# Patient Record
Sex: MALE | Race: WHITE | NOT HISPANIC OR LATINO | Employment: OTHER | URBAN - METROPOLITAN AREA
[De-identification: names, ages, dates, MRNs, and addresses within clinical notes are randomized per-mention and may not be internally consistent; named-entity substitution may affect disease eponyms.]

---

## 2018-12-28 ENCOUNTER — OFFICE VISIT (OUTPATIENT)
Dept: DERMATOLOGY | Facility: CLINIC | Age: 68
End: 2018-12-28
Payer: MEDICARE

## 2018-12-28 DIAGNOSIS — L82.1 SEBORRHEIC KERATOSIS: Primary | ICD-10-CM

## 2018-12-28 DIAGNOSIS — Z13.89 SCREENING FOR SKIN CONDITION: ICD-10-CM

## 2018-12-28 PROCEDURE — 99213 OFFICE O/P EST LOW 20 MIN: CPT | Performed by: DERMATOLOGY

## 2018-12-28 RX ORDER — CHLORAL HYDRATE 500 MG
1000 CAPSULE ORAL
COMMUNITY
Start: 2014-10-20 | End: 2020-11-12

## 2018-12-28 RX ORDER — GLIMEPIRIDE 2 MG/1
2 TABLET ORAL
COMMUNITY
Start: 2018-03-12

## 2018-12-28 RX ORDER — BLOOD-GLUCOSE METER
KIT MISCELLANEOUS
COMMUNITY
Start: 2018-12-03

## 2018-12-28 RX ORDER — ASPIRIN 325 MG
325 TABLET ORAL
COMMUNITY
End: 2022-01-24 | Stop reason: HOSPADM

## 2018-12-28 RX ORDER — RAMIPRIL 10 MG/1
CAPSULE ORAL
COMMUNITY
Start: 2018-11-05

## 2018-12-28 RX ORDER — METFORMIN HYDROCHLORIDE 1000 MG/1
TABLET, FILM COATED, EXTENDED RELEASE ORAL
COMMUNITY
End: 2020-11-12

## 2018-12-28 RX ORDER — PEN NEEDLE, DIABETIC 31 GX5/16"
NEEDLE, DISPOSABLE MISCELLANEOUS
COMMUNITY
Start: 2018-11-22

## 2018-12-28 RX ORDER — ROSUVASTATIN CALCIUM 10 MG/1
TABLET, COATED ORAL
COMMUNITY
End: 2022-01-24 | Stop reason: HOSPADM

## 2018-12-28 RX ORDER — METOPROLOL SUCCINATE 100 MG/1
TABLET, EXTENDED RELEASE ORAL
COMMUNITY
Start: 2018-11-01 | End: 2022-01-24 | Stop reason: HOSPADM

## 2018-12-28 NOTE — PROGRESS NOTES
[address]  [address]  [facility] [address]  [phone]  [phone]     MRN: [id number] : 1950  Encounter: [id number]  Patient Information: Kyrie Woodard  Chief complaint:  Yearly checkup    History of present illness:  57-year-old male presents for follow-up for overall skin check no specific concerns except for a slightly tender lesion on his left lower thigh  No past medical history on file  No past surgical history on file  Social History   History   Alcohol use Not on file     History   Drug use: Unknown     History   Smoking Status    Never Smoker   Smokeless Tobacco    Never Used     No family history on file  Meds/Allergies   No Known Allergies    Meds:  Prior to Admission medications    Medication Sig Start Date End Date Taking?  Authorizing Provider   aspirin 325 mg tablet Take 325 mg by mouth   Yes Historical Provider, MD JUNG UF III MINI PEN NEEDLES 31G X 5 MM MISC  18  Yes Historical Provider, MD   FREESTYLE LITE test strip  12/3/18  Yes Historical Provider, MD   glimepiride (AMARYL) 2 mg tablet Take 2 mg by mouth 3/12/18  Yes Historical Provider, MD   insulin detemir (LEVEMIR FLEXTOUCH) 100 Units/mL injection pen INJECT 21 UNITS AT BEDTIME 18  Yes Historical Provider, MD   metFORMIN (GLUMETZA) 1000 MG (MOD) 24 hr tablet Take by mouth   Yes Historical Provider, MD   metoprolol succinate (TOPROL-XL) 100 mg 24 hr tablet  18  Yes Historical Provider, MD   Omega-3 Fatty Acids (FISH OIL) 1,000 mg Take 1,000 mg by mouth 10/20/14  Yes Historical Provider, MD   ramipril (ALTACE) 10 MG capsule TAKE 1 CAPSULE TWICE A DAY 18  Yes Historical Provider, MD   rosuvastatin (CRESTOR) 10 MG tablet Take by mouth   Yes Historical Provider, MD       Subjective:     Review of Systems:    General: negative for - chills, fatigue, fever,  weight gain or weight loss  Psychological: negative for - anxiety, behavioral disorder, concentration difficulties, decreased libido, depression, irritability, memory difficulties, mood swings, sleep disturbances or suicidal ideation  ENT: negative for - hearing difficulties , nasal congestion, nasal discharge, oral lesions, sinus pain, sneezing, sore throat  Allergy and Immunology: negative for - hives, insect bite sensitivity,  Hematological and Lymphatic: negative for - bleeding problems, blood clots,bruising, swollen lymph nodes  Endocrine: negative for - hair pattern changes, hot flashes, malaise/lethargy, mood swings, palpitations, polydipsia/polyuria, skin changes, temperature intolerance or unexpected weight change  Respiratory: negative for - cough, hemoptysis, orthopnea, shortness of breath, or wheezing  Cardiovascular: negative for - chest pain, dyspnea on exertion, edema,  Gastrointestinal: negative for - abdominal pain, nausea/vomiting  Genito-Urinary: negative for - dysuria, incontinence, irregular/heavy menses or urinary frequency/urgency  Musculoskeletal: negative for - gait disturbance, joint pain, joint stiffness, joint swelling, muscle pain, muscular weakness  Dermatological:  As in HPI  Neurological: negative for confusion, dizziness, headaches, impaired coordination/balance, memory loss, numbness/tingling, seizures, speech problems, tremors or weakness       Objective: There were no vitals taken for this visit  Physical Exam:    General Appearance:    Alert, cooperative, no distress   Head:    Normocephalic, without obvious abnormality, atraumatic           Skin:   A full skin exam was performed including scalp, head scalp, eyes, ears, nose, lips, neck, chest, axilla, abdomen, back, buttocks, bilateral upper extremities, bilateral lower extremities, hands, feet, fingers, toes, fingernails, and toenails normal keratotic papules greasy stuck on appearance inflamed cystic papule noted on the left groin nothing else remarkable noted exam     Assessment:     1   Seborrheic keratosis 2  Screening for skin condition           Plan:   Seborrheic Keratosis  Patient reasurred these are normal growths we acquire with age no treatment needed  Screening for Dermatologic Disorders: Nothing else of concern noted on complete exam follow up in 1 year   Patient with a little cystic area left thigh may be just a acneiform process if not resolving or more chronic process patient to let us know    Orly Escamilla MD  12/28/2018,10:32 AM    Portions of the record may have been created with voice recognition software   Occasional wrong word or "sound a like" substitutions may have occurred due to the inherent limitations of voice recognition software   Read the chart carefully and recognize, using context, where substitutions have occurred

## 2018-12-28 NOTE — PATIENT INSTRUCTIONS
Seborrheic Keratosis  Patient reasurred these are normal growths we acquire with age no treatment needed    Screening for Dermatologic Disorders: Nothing else of concern noted on complete exam follow up in 1 year   Patient with a little cystic area left thigh may be just a acneiform process if not resolving or more chronic process patient to let us know

## 2019-05-31 ENCOUNTER — TELEPHONE (OUTPATIENT)
Dept: GASTROENTEROLOGY | Facility: CLINIC | Age: 69
End: 2019-05-31

## 2019-06-10 ENCOUNTER — TELEPHONE (OUTPATIENT)
Dept: GASTROENTEROLOGY | Facility: CLINIC | Age: 69
End: 2019-06-10

## 2019-06-25 ENCOUNTER — TELEPHONE (OUTPATIENT)
Dept: GASTROENTEROLOGY | Facility: CLINIC | Age: 69
End: 2019-06-25

## 2019-06-26 ENCOUNTER — ANESTHESIA EVENT (OUTPATIENT)
Dept: GASTROENTEROLOGY | Facility: HOSPITAL | Age: 69
End: 2019-06-26

## 2019-06-27 ENCOUNTER — ANESTHESIA (OUTPATIENT)
Dept: GASTROENTEROLOGY | Facility: HOSPITAL | Age: 69
End: 2019-06-27

## 2019-06-27 ENCOUNTER — HOSPITAL ENCOUNTER (OUTPATIENT)
Dept: GASTROENTEROLOGY | Facility: HOSPITAL | Age: 69
Setting detail: OUTPATIENT SURGERY
Discharge: HOME | End: 2019-06-27
Attending: INTERNAL MEDICINE | Admitting: INTERNAL MEDICINE
Payer: MEDICARE

## 2019-06-27 VITALS
BODY MASS INDEX: 24.64 KG/M2 | SYSTOLIC BLOOD PRESSURE: 140 MMHG | WEIGHT: 198.19 LBS | HEIGHT: 75 IN | HEART RATE: 58 BPM | RESPIRATION RATE: 18 BRPM | TEMPERATURE: 97.2 F | DIASTOLIC BLOOD PRESSURE: 68 MMHG | OXYGEN SATURATION: 98 %

## 2019-06-27 DIAGNOSIS — Z12.11 SCREEN FOR COLON CANCER: ICD-10-CM

## 2019-06-27 LAB — GLUCOSE SERPL-MCNC: 248 MG/DL (ref 65–140)

## 2019-06-27 PROCEDURE — 88305 TISSUE EXAM BY PATHOLOGIST: CPT | Performed by: PATHOLOGY

## 2019-06-27 PROCEDURE — 82948 REAGENT STRIP/BLOOD GLUCOSE: CPT

## 2019-06-27 PROCEDURE — 45385 COLONOSCOPY W/LESION REMOVAL: CPT | Performed by: INTERNAL MEDICINE

## 2019-06-27 RX ORDER — PROPOFOL 10 MG/ML
INJECTION, EMULSION INTRAVENOUS AS NEEDED
Status: DISCONTINUED | OUTPATIENT
Start: 2019-06-27 | End: 2019-06-27 | Stop reason: SURG

## 2019-06-27 RX ORDER — LIDOCAINE HYDROCHLORIDE 10 MG/ML
INJECTION, SOLUTION INFILTRATION; PERINEURAL AS NEEDED
Status: DISCONTINUED | OUTPATIENT
Start: 2019-06-27 | End: 2019-06-27 | Stop reason: SURG

## 2019-06-27 RX ORDER — SODIUM CHLORIDE, SODIUM LACTATE, POTASSIUM CHLORIDE, CALCIUM CHLORIDE 600; 310; 30; 20 MG/100ML; MG/100ML; MG/100ML; MG/100ML
INJECTION, SOLUTION INTRAVENOUS CONTINUOUS PRN
Status: DISCONTINUED | OUTPATIENT
Start: 2019-06-27 | End: 2019-06-27 | Stop reason: SURG

## 2019-06-27 RX ADMIN — PROPOFOL 100 MG: 10 INJECTION, EMULSION INTRAVENOUS at 07:42

## 2019-06-27 RX ADMIN — LIDOCAINE HYDROCHLORIDE 50 MG: 10 INJECTION, SOLUTION INFILTRATION; PERINEURAL at 07:42

## 2019-06-27 RX ADMIN — PROPOFOL 50 MG: 10 INJECTION, EMULSION INTRAVENOUS at 07:47

## 2019-06-27 RX ADMIN — SODIUM CHLORIDE, SODIUM LACTATE, POTASSIUM CHLORIDE, AND CALCIUM CHLORIDE: .6; .31; .03; .02 INJECTION, SOLUTION INTRAVENOUS at 07:28

## 2019-06-27 RX ADMIN — PROPOFOL 50 MG: 10 INJECTION, EMULSION INTRAVENOUS at 07:50

## 2019-06-27 RX ADMIN — PROPOFOL 50 MG: 10 INJECTION, EMULSION INTRAVENOUS at 07:44

## 2019-06-27 NOTE — H&P
History and Physical -  [facility]  Charline Yoder 76 y o  male MRN: [id number]                  HPI: Charline Yoder is a 76y o  year old male who presents for colonoscopy for a history of colon polyps  Last colonoscopy 5 years ago      REVIEW OF SYSTEMS: Per the HPI, and otherwise unremarkable  Historical Information   Past Medical History:   Diagnosis Date    Colon cancer ([address] )     Colon polyp      Past Surgical History:   Procedure Laterality Date    CATARACT EXTRACTION      COLONOSCOPY      CORONARY ANGIOPLASTY WITH STENT PLACEMENT  2003    CYST REMOVAL  2016     Social History   Social History     Substance and Sexual Activity   Alcohol Use Never    Frequency: Never     Social History     Substance and Sexual Activity   Drug Use Never     Social History     Tobacco Use   Smoking Status Former Smoker   Smokeless Tobacco Never Used     History reviewed  No pertinent family history  Meds/Allergies       (Not in a hospital admission)    No Known Allergies    Objective     Blood pressure 139/73, pulse 62, temperature 97 9 °F (36 6 °C), temperature source Oral, resp  rate 16, height 6' 3" (1 905 m), weight 89 9 kg (198 lb 3 1 oz), SpO2 97 %        PHYSICAL EXAM    Gen: NAD  CV: RRR  CHEST: Clear  ABD: soft, NT/ND  EXT: no edema  Neuro: AAO      ASSESSMENT/PLAN:  This is a 76y o  year old male here for history of colon polyps    PLAN:   Procedure:  Colonoscopy

## 2019-06-27 NOTE — DISCHARGE INSTRUCTIONS
Colonoscopy   WHAT YOU NEED TO KNOW:   A colonoscopy is a procedure to examine the inside of your colon (intestine) with a scope  Polyps or tissue growths may have been removed during your colonoscopy  It is normal to feel bloated and to have some abdominal discomfort  You should be passing gas  If you have hemorrhoids or you had polyps removed, you may have a small amount of bleeding  DISCHARGE INSTRUCTIONS:   Seek care immediately if:   · You have a large amount of bright red blood in your bowel movements  · Your abdomen is hard and firm and you have severe pain  · You have sudden trouble breathing  Contact your healthcare provider if:   · You develop a rash or hives  · You have a fever within 24 hours of your procedure       · You have not had a bowel movement for 3 days after your procedure  · You have questions or concerns about your condition or care  Activity:   · Do not lift, strain, or run  for 3 days after your procedure  · Rest after your procedure  You have been given medicine to relax you  Do not  drive or make important decisions until the day after your procedure  Return to your normal activity as directed  · Relieve gas and discomfort from bloating  by lying on your right side with a heating pad on your abdomen  You may need to take short walks to help the gas move out  Eat small meals until bloating is relieved  If you had polyps removed: For 7 days after your procedure:  · Do not  take aspirin  · Do not  go on long car rides  Follow up with your healthcare provider as directed:  Write down your questions so you remember to ask them during your visits  © 2017 [employer] is for End User's use only and may not be sold, redistributed or otherwise used for commercial purposes  All illustrations and images included in CareNotes® are the copyrighted property of [employer]  or Jeremiah Ndiaye    The above information is an  only  It is not intended as medical advice for individual conditions or treatments  Talk to your doctor, nurse or pharmacist before following any medical regimen to see if it is safe and effective for you

## 2019-07-02 ENCOUNTER — TELEPHONE (OUTPATIENT)
Dept: GASTROENTEROLOGY | Facility: CLINIC | Age: 69
End: 2019-07-02

## 2019-12-30 ENCOUNTER — OFFICE VISIT (OUTPATIENT)
Dept: DERMATOLOGY | Facility: CLINIC | Age: 69
End: 2019-12-30
Payer: MEDICARE

## 2019-12-30 DIAGNOSIS — L72.9 FOLLICULAR CYST OF SKIN: Primary | ICD-10-CM

## 2019-12-30 DIAGNOSIS — L82.1 SEBORRHEIC KERATOSIS: ICD-10-CM

## 2019-12-30 DIAGNOSIS — Z13.89 SCREENING FOR SKIN CONDITION: ICD-10-CM

## 2019-12-30 PROCEDURE — 99213 OFFICE O/P EST LOW 20 MIN: CPT | Performed by: DERMATOLOGY

## 2019-12-30 NOTE — PATIENT INSTRUCTIONS
Follicular cyst advised patient that this is from hair follicles that ballooned out and forms this type of growth  No treatment indicated unless patient so desires or if lesion enlarges or changes  Seborrheic Keratosis  Patient reasurred these are normal growths we acquire with age no treatment needed    Screening for Dermatologic Disorders: Nothing else of concern noted on complete exam follow up in 1 year

## 2019-12-30 NOTE — PROGRESS NOTES
[facility] DERMATOLOGY  [address] [place] [id number] [phone]     MRN: [id number] : 1950  Encounter: [id number]  Patient Information: Heidy Sanders  Chief complaint:  Skin check    History of present illness:  71 y o  Male with previous noted keratosis follicular cyst presents for overall checkup patient concerned regarding a new lesion that was previously excised on the right chest has recurred  Past Medical History:   Diagnosis Date    Colon cancer ([address] )     Colon polyp      Past Surgical History:   Procedure Laterality Date    CATARACT EXTRACTION      COLONOSCOPY      CORONARY ANGIOPLASTY WITH STENT PLACEMENT      CYST REMOVAL  2016     Social History   Social History     Substance and Sexual Activity   Alcohol Use Never    Frequency: Never     Social History     Substance and Sexual Activity   Drug Use Never     Social History     Tobacco Use   Smoking Status Former Smoker   Smokeless Tobacco Never Used     History reviewed  No pertinent family history  Meds/Allergies   No Known Allergies    Meds:  Prior to Admission medications    Medication Sig Start Date End Date Taking?  Authorizing Provider   aspirin 325 mg tablet Take 325 mg by mouth   Yes Historical Provider, MD JUNG UF III MINI PEN NEEDLES 31G X 5 MM MISC  18  Yes Historical Provider, MD   FREESTYLE LITE test strip  12/3/18  Yes Historical Provider, MD   glimepiride (AMARYL) 2 mg tablet Take 2 mg by mouth 3/12/18  Yes Historical Provider, MD   insulin detemir (LEVEMIR FLEXTOUCH) 100 Units/mL injection pen INJECT 21 UNITS AT BEDTIME 18  Yes Historical Provider, MD   metFORMIN (GLUMETZA) 1000 MG (MOD) 24 hr tablet Take by mouth   Yes Historical Provider, MD   metoprolol succinate (TOPROL-XL) 100 mg 24 hr tablet  18  Yes Historical Provider, MD   Omega-3 Fatty Acids (FISH OIL) 1,000 mg Take 1,000 mg by mouth 10/20/14  Yes Historical Provider, MD ramipril (ALTACE) 10 MG capsule TAKE 1 CAPSULE TWICE A DAY 11/5/18  Yes Historical Provider, MD   rosuvastatin (CRESTOR) 10 MG tablet Take by mouth   Yes Historical Provider, MD       Subjective:     Review of Systems:    General: negative for - chills, fatigue, fever,  weight gain or weight loss  Psychological: negative for - anxiety, behavioral disorder, concentration difficulties, decreased libido, depression, irritability, memory difficulties, mood swings, sleep disturbances or suicidal ideation  ENT: negative for - hearing difficulties , nasal congestion, nasal discharge, oral lesions, sinus pain, sneezing, sore throat  Allergy and Immunology: negative for - hives, insect bite sensitivity,  Hematological and Lymphatic: negative for - bleeding problems, blood clots,bruising, swollen lymph nodes  Endocrine: negative for - hair pattern changes, hot flashes, malaise/lethargy, mood swings, palpitations, polydipsia/polyuria, skin changes, temperature intolerance or unexpected weight change  Respiratory: negative for - cough, hemoptysis, orthopnea, shortness of breath, or wheezing  Cardiovascular: negative for - chest pain, dyspnea on exertion, edema,  Gastrointestinal: negative for - abdominal pain, nausea/vomiting  Genito-Urinary: negative for - dysuria, incontinence, irregular/heavy menses or urinary frequency/urgency  Musculoskeletal: negative for - gait disturbance, joint pain, joint stiffness, joint swelling, muscle pain, muscular weakness  Dermatological:  As in HPI  Neurological: negative for confusion, dizziness, headaches, impaired coordination/balance, memory loss, numbness/tingling, seizures, speech problems, tremors or weakness       Objective: There were no vitals taken for this visit      Physical Exam:    General Appearance:    Alert, cooperative, no distress   Head:    Normocephalic, without obvious abnormality, atraumatic           Skin:   A full skin exam was performed including scalp, head scalp, eyes, ears, nose, lips, neck, chest, axilla, abdomen, back, buttocks, bilateral upper extremities, bilateral lower extremities, hands, feet, fingers, toes, fingernails, and toenails normal keratotic papules greasy stuck appearance 7 mm cystic nodule noted on the right chest nothing else remarkable noted on complete exam     Assessment:     1  Follicular cyst of skin     2  Seborrheic keratosis     3  Screening for skin condition           Plan: Follicular cyst advised patient that this is from hair follicles that ballooned out and forms this type of growth  No treatment indicated unless patient so desires or if lesion enlarges or changes  Seborrheic Keratosis  Patient reasurred these are normal growths we acquire with age no treatment needed  Screening for Dermatologic Disorders: Nothing else of concern noted on complete exam follow up in 1 year       Dada Lui MD  12/30/2019,9:59 AM    Portions of the record may have been created with voice recognition software   Occasional wrong word or "sound a like" substitutions may have occurred due to the inherent limitations of voice recognition software   Read the chart carefully and recognize, using context, where substitutions have occurred

## 2020-09-08 ENCOUNTER — TRANSCRIBE ORDERS (OUTPATIENT)
Dept: ADMINISTRATIVE | Facility: HOSPITAL | Age: 70
End: 2020-09-08

## 2020-09-08 DIAGNOSIS — M25.771 OSTEOPHYTE OF RIGHT ANKLE: Primary | ICD-10-CM

## 2020-09-30 ENCOUNTER — HOSPITAL ENCOUNTER (OUTPATIENT)
Dept: MRI IMAGING | Facility: HOSPITAL | Age: 70
Discharge: HOME | End: 2020-09-30
Payer: MEDICARE

## 2020-09-30 DIAGNOSIS — M25.771 OSTEOPHYTE OF RIGHT ANKLE: ICD-10-CM

## 2020-09-30 PROCEDURE — 73721 MRI JNT OF LWR EXTRE W/O DYE: CPT

## 2020-09-30 PROCEDURE — G1004 CDSM NDSC: HCPCS

## 2020-11-12 ENCOUNTER — OFFICE VISIT (OUTPATIENT)
Dept: GASTROENTEROLOGY | Facility: CLINIC | Age: 70
End: 2020-11-12
Payer: MEDICARE

## 2020-11-12 VITALS
TEMPERATURE: 98 F | HEART RATE: 63 BPM | SYSTOLIC BLOOD PRESSURE: 140 MMHG | BODY MASS INDEX: 28.1 KG/M2 | RESPIRATION RATE: 18 BRPM | DIASTOLIC BLOOD PRESSURE: 90 MMHG | WEIGHT: 226 LBS | HEIGHT: 75 IN

## 2020-11-12 DIAGNOSIS — Z86.010 HISTORY OF COLON POLYPS: ICD-10-CM

## 2020-11-12 DIAGNOSIS — R10.32 LEFT LOWER QUADRANT ABDOMINAL PAIN: ICD-10-CM

## 2020-11-12 DIAGNOSIS — K21.9 GASTROESOPHAGEAL REFLUX DISEASE WITHOUT ESOPHAGITIS: Primary | ICD-10-CM

## 2020-11-12 DIAGNOSIS — R19.7 DIARRHEA, UNSPECIFIED TYPE: ICD-10-CM

## 2020-11-12 DIAGNOSIS — K59.00 CONSTIPATION, UNSPECIFIED CONSTIPATION TYPE: ICD-10-CM

## 2020-11-12 PROCEDURE — 99204 OFFICE O/P NEW MOD 45 MIN: CPT | Performed by: PHYSICIAN ASSISTANT

## 2020-11-12 RX ORDER — PANTOPRAZOLE SODIUM 40 MG/1
40 TABLET, DELAYED RELEASE ORAL DAILY
Qty: 30 TABLET | Refills: 1 | Status: SHIPPED | OUTPATIENT
Start: 2020-11-12

## 2020-11-17 ENCOUNTER — ANESTHESIA EVENT (OUTPATIENT)
Dept: GASTROENTEROLOGY | Facility: HOSPITAL | Age: 70
End: 2020-11-17

## 2020-11-18 ENCOUNTER — HOSPITAL ENCOUNTER (OUTPATIENT)
Dept: GASTROENTEROLOGY | Facility: HOSPITAL | Age: 70
Setting detail: OUTPATIENT SURGERY
Discharge: HOME | End: 2020-11-18
Attending: INTERNAL MEDICINE
Payer: MEDICARE

## 2020-11-18 ENCOUNTER — TELEPHONE (OUTPATIENT)
Dept: GASTROENTEROLOGY | Facility: CLINIC | Age: 70
End: 2020-11-18

## 2020-11-18 ENCOUNTER — ANESTHESIA (OUTPATIENT)
Dept: GASTROENTEROLOGY | Facility: HOSPITAL | Age: 70
End: 2020-11-18

## 2020-11-18 VITALS
HEART RATE: 50 BPM | SYSTOLIC BLOOD PRESSURE: 165 MMHG | DIASTOLIC BLOOD PRESSURE: 77 MMHG | TEMPERATURE: 98.4 F | HEIGHT: 75 IN | WEIGHT: 221.78 LBS | OXYGEN SATURATION: 100 % | BODY MASS INDEX: 27.58 KG/M2 | RESPIRATION RATE: 22 BRPM

## 2020-11-18 VITALS — HEART RATE: 58 BPM

## 2020-11-18 DIAGNOSIS — Z12.11 SCREEN FOR COLON CANCER: ICD-10-CM

## 2020-11-18 DIAGNOSIS — Z86.010 HISTORY OF COLON POLYPS: ICD-10-CM

## 2020-11-18 DIAGNOSIS — R10.32 LEFT LOWER QUADRANT ABDOMINAL PAIN: ICD-10-CM

## 2020-11-18 DIAGNOSIS — K21.9 GASTROESOPHAGEAL REFLUX DISEASE WITHOUT ESOPHAGITIS: ICD-10-CM

## 2020-11-18 DIAGNOSIS — K59.00 CONSTIPATION, UNSPECIFIED CONSTIPATION TYPE: ICD-10-CM

## 2020-11-18 DIAGNOSIS — R19.7 DIARRHEA, UNSPECIFIED TYPE: ICD-10-CM

## 2020-11-18 LAB — GLUCOSE SERPL-MCNC: 145 MG/DL (ref 65–140)

## 2020-11-18 PROCEDURE — 88305 TISSUE EXAM BY PATHOLOGIST: CPT | Performed by: PATHOLOGY

## 2020-11-18 PROCEDURE — 82948 REAGENT STRIP/BLOOD GLUCOSE: CPT

## 2020-11-18 PROCEDURE — 45385 COLONOSCOPY W/LESION REMOVAL: CPT | Performed by: INTERNAL MEDICINE

## 2020-11-18 PROCEDURE — 43235 EGD DIAGNOSTIC BRUSH WASH: CPT | Performed by: INTERNAL MEDICINE

## 2020-11-18 RX ORDER — PROPOFOL 10 MG/ML
INJECTION, EMULSION INTRAVENOUS AS NEEDED
Status: DISCONTINUED | OUTPATIENT
Start: 2020-11-18 | End: 2020-11-18

## 2020-11-18 RX ORDER — METFORMIN HYDROCHLORIDE 750 MG/1
750 TABLET, EXTENDED RELEASE ORAL
COMMUNITY

## 2020-11-18 RX ORDER — LIDOCAINE HYDROCHLORIDE 10 MG/ML
INJECTION, SOLUTION EPIDURAL; INFILTRATION; INTRACAUDAL; PERINEURAL AS NEEDED
Status: DISCONTINUED | OUTPATIENT
Start: 2020-11-18 | End: 2020-11-18

## 2020-11-18 RX ORDER — LIDOCAINE HYDROCHLORIDE 10 MG/ML
0.5 INJECTION, SOLUTION EPIDURAL; INFILTRATION; INTRACAUDAL; PERINEURAL ONCE AS NEEDED
Status: DISCONTINUED | OUTPATIENT
Start: 2020-11-18 | End: 2020-11-22 | Stop reason: HOSPADM

## 2020-11-18 RX ORDER — SODIUM CHLORIDE, SODIUM LACTATE, POTASSIUM CHLORIDE, CALCIUM CHLORIDE 600; 310; 30; 20 MG/100ML; MG/100ML; MG/100ML; MG/100ML
125 INJECTION, SOLUTION INTRAVENOUS CONTINUOUS
Status: DISCONTINUED | OUTPATIENT
Start: 2020-11-18 | End: 2020-11-18

## 2020-11-18 RX ORDER — AMLODIPINE BESYLATE 10 MG/1
10 TABLET ORAL DAILY
COMMUNITY

## 2020-11-18 RX ADMIN — PROPOFOL 20 MG: 10 INJECTION, EMULSION INTRAVENOUS at 09:12

## 2020-11-18 RX ADMIN — PROPOFOL 20 MG: 10 INJECTION, EMULSION INTRAVENOUS at 09:20

## 2020-11-18 RX ADMIN — PROPOFOL 50 MG: 10 INJECTION, EMULSION INTRAVENOUS at 09:00

## 2020-11-18 RX ADMIN — PROPOFOL 20 MG: 10 INJECTION, EMULSION INTRAVENOUS at 09:10

## 2020-11-18 RX ADMIN — PROPOFOL 30 MG: 10 INJECTION, EMULSION INTRAVENOUS at 09:06

## 2020-11-18 RX ADMIN — LIDOCAINE HYDROCHLORIDE 50 MG: 10 INJECTION, SOLUTION EPIDURAL; INFILTRATION; INTRACAUDAL; PERINEURAL at 08:59

## 2020-11-18 RX ADMIN — PROPOFOL 20 MG: 10 INJECTION, EMULSION INTRAVENOUS at 09:08

## 2020-11-18 RX ADMIN — PROPOFOL 100 MG: 10 INJECTION, EMULSION INTRAVENOUS at 08:59

## 2020-11-18 RX ADMIN — PROPOFOL 30 MG: 10 INJECTION, EMULSION INTRAVENOUS at 09:01

## 2020-11-18 RX ADMIN — PROPOFOL 20 MG: 10 INJECTION, EMULSION INTRAVENOUS at 09:17

## 2020-11-18 RX ADMIN — PROPOFOL 20 MG: 10 INJECTION, EMULSION INTRAVENOUS at 09:16

## 2020-11-18 RX ADMIN — SODIUM CHLORIDE, SODIUM LACTATE, POTASSIUM CHLORIDE, AND CALCIUM CHLORIDE 125 ML/HR: .6; .31; .03; .02 INJECTION, SOLUTION INTRAVENOUS at 08:09

## 2020-11-18 RX ADMIN — PROPOFOL 20 MG: 10 INJECTION, EMULSION INTRAVENOUS at 09:03

## 2020-11-19 ENCOUNTER — TELEPHONE (OUTPATIENT)
Dept: GASTROENTEROLOGY | Facility: CLINIC | Age: 70
End: 2020-11-19

## 2020-11-23 ENCOUNTER — HOSPITAL ENCOUNTER (OUTPATIENT)
Dept: RADIOLOGY | Facility: MEDICAL CENTER | Age: 70
Discharge: HOME | End: 2020-11-23
Payer: MEDICARE

## 2020-11-23 DIAGNOSIS — R10.32 LEFT LOWER QUADRANT ABDOMINAL PAIN: ICD-10-CM

## 2020-11-23 PROCEDURE — G1004 CDSM NDSC: HCPCS

## 2020-11-23 PROCEDURE — 74177 CT ABD & PELVIS W/CONTRAST: CPT

## 2020-11-23 RX ADMIN — IOHEXOL 100 ML: 350 INJECTION, SOLUTION INTRAVENOUS at 11:02

## 2020-11-24 ENCOUNTER — TELEPHONE (OUTPATIENT)
Dept: GASTROENTEROLOGY | Facility: CLINIC | Age: 70
End: 2020-11-24

## 2020-11-25 ENCOUNTER — TELEPHONE (OUTPATIENT)
Dept: GASTROENTEROLOGY | Facility: CLINIC | Age: 70
End: 2020-11-25

## 2020-12-08 ENCOUNTER — ANESTHESIA EVENT (OUTPATIENT)
Dept: GASTROENTEROLOGY | Facility: HOSPITAL | Age: 70
End: 2020-12-08

## 2020-12-08 PROBLEM — E10.9 DIABETES MELLITUS TYPE 1 (HCC): Status: ACTIVE | Noted: 2020-12-08

## 2020-12-08 PROBLEM — I25.10 CAD (CORONARY ARTERY DISEASE): Status: ACTIVE | Noted: 2020-12-08

## 2020-12-08 PROBLEM — Z98.61 HISTORY OF PTCA: Status: ACTIVE | Noted: 2020-12-08

## 2020-12-08 RX ORDER — SODIUM CHLORIDE 9 MG/ML
125 INJECTION, SOLUTION INTRAVENOUS CONTINUOUS
Status: CANCELLED | OUTPATIENT
Start: 2020-12-08

## 2020-12-08 RX ORDER — LIDOCAINE HYDROCHLORIDE 10 MG/ML
0.5 INJECTION, SOLUTION EPIDURAL; INFILTRATION; INTRACAUDAL; PERINEURAL ONCE AS NEEDED
Status: CANCELLED | OUTPATIENT
Start: 2020-12-08

## 2020-12-09 ENCOUNTER — HOSPITAL ENCOUNTER (OUTPATIENT)
Dept: GASTROENTEROLOGY | Facility: HOSPITAL | Age: 70
Setting detail: OUTPATIENT SURGERY
Discharge: HOME | End: 2020-12-09
Attending: INTERNAL MEDICINE | Admitting: INTERNAL MEDICINE
Payer: MEDICARE

## 2020-12-09 ENCOUNTER — ANESTHESIA (OUTPATIENT)
Dept: GASTROENTEROLOGY | Facility: HOSPITAL | Age: 70
End: 2020-12-09

## 2020-12-09 VITALS
TEMPERATURE: 97.9 F | DIASTOLIC BLOOD PRESSURE: 69 MMHG | RESPIRATION RATE: 16 BRPM | SYSTOLIC BLOOD PRESSURE: 127 MMHG | OXYGEN SATURATION: 98 % | HEART RATE: 54 BPM

## 2020-12-09 VITALS — HEART RATE: 65 BPM

## 2020-12-09 DIAGNOSIS — K31.89 SUBMUCOSAL LESION OF STOMACH: ICD-10-CM

## 2020-12-09 PROCEDURE — 43237 ENDOSCOPIC US EXAM ESOPH: CPT | Performed by: INTERNAL MEDICINE

## 2020-12-09 RX ORDER — PROPOFOL 10 MG/ML
INJECTION, EMULSION INTRAVENOUS AS NEEDED
Status: DISCONTINUED | OUTPATIENT
Start: 2020-12-09 | End: 2020-12-09

## 2020-12-09 RX ORDER — LIDOCAINE HYDROCHLORIDE 10 MG/ML
INJECTION, SOLUTION EPIDURAL; INFILTRATION; INTRACAUDAL; PERINEURAL AS NEEDED
Status: DISCONTINUED | OUTPATIENT
Start: 2020-12-09 | End: 2020-12-09

## 2020-12-09 RX ORDER — PROPOFOL 10 MG/ML
INJECTION, EMULSION INTRAVENOUS CONTINUOUS PRN
Status: DISCONTINUED | OUTPATIENT
Start: 2020-12-09 | End: 2020-12-09

## 2020-12-09 RX ORDER — SODIUM CHLORIDE 9 MG/ML
INJECTION, SOLUTION INTRAVENOUS CONTINUOUS PRN
Status: DISCONTINUED | OUTPATIENT
Start: 2020-12-09 | End: 2020-12-09

## 2020-12-09 RX ADMIN — PROPOFOL 120 MG: 10 INJECTION, EMULSION INTRAVENOUS at 09:31

## 2020-12-09 RX ADMIN — PROPOFOL 30 MG: 10 INJECTION, EMULSION INTRAVENOUS at 09:32

## 2020-12-09 RX ADMIN — PROPOFOL 120 MCG/KG/MIN: 10 INJECTION, EMULSION INTRAVENOUS at 09:31

## 2020-12-09 RX ADMIN — LIDOCAINE HYDROCHLORIDE 100 MG: 10 INJECTION, SOLUTION EPIDURAL; INFILTRATION; INTRACAUDAL; PERINEURAL at 09:31

## 2020-12-09 RX ADMIN — SODIUM CHLORIDE: 9 INJECTION, SOLUTION INTRAVENOUS at 09:24

## 2021-01-04 ENCOUNTER — OFFICE VISIT (OUTPATIENT)
Dept: DERMATOLOGY | Facility: CLINIC | Age: 71
End: 2021-01-04
Payer: MEDICARE

## 2021-01-04 VITALS — TEMPERATURE: 97.8 F

## 2021-01-04 DIAGNOSIS — L82.1 SEBORRHEIC KERATOSIS: ICD-10-CM

## 2021-01-04 DIAGNOSIS — L98.9 UNKNOWN SKIN LESION: Primary | ICD-10-CM

## 2021-01-04 DIAGNOSIS — L57.0 ACTINIC KERATOSIS: ICD-10-CM

## 2021-01-04 DIAGNOSIS — Z13.89 SCREENING FOR SKIN CONDITION: ICD-10-CM

## 2021-01-04 PROCEDURE — 88305 TISSUE EXAM BY PATHOLOGIST: CPT | Performed by: STUDENT IN AN ORGANIZED HEALTH CARE EDUCATION/TRAINING PROGRAM

## 2021-01-04 PROCEDURE — 17000 DESTRUCT PREMALG LESION: CPT | Performed by: DERMATOLOGY

## 2021-01-04 PROCEDURE — 11102 TANGNTL BX SKIN SINGLE LES: CPT | Performed by: DERMATOLOGY

## 2021-01-04 PROCEDURE — 99213 OFFICE O/P EST LOW 20 MIN: CPT | Performed by: DERMATOLOGY

## 2021-01-04 RX ORDER — FLUTICASONE PROPIONATE 50 MCG
SPRAY, SUSPENSION (ML) NASAL
COMMUNITY
Start: 2020-10-17

## 2021-01-04 RX ORDER — FLASH GLUCOSE SCANNING READER
1 EACH MISCELLANEOUS
COMMUNITY
Start: 2020-08-13

## 2021-01-04 RX ORDER — LORATADINE 10 MG/1
TABLET ORAL
COMMUNITY

## 2021-01-04 RX ORDER — SILODOSIN 8 MG/1
8 CAPSULE ORAL DAILY
COMMUNITY
Start: 2020-12-09 | End: 2022-03-11

## 2021-01-04 NOTE — PATIENT INSTRUCTIONS
Skin lesion possible basal cell carcinoma if positive would require curettage  Actinic Keratosis:  Patient advised lesions are precancers  These should resolve with cryosurgery if not to let us know sun block recommended  Seborrheic Keratosis  Patient reasurred these are normal growths we acquire with age no treatment needed  Screening for Dermatologic Disorders: Nothing else of concern noted on complete exam follow up in 1 year   Wound care instructions given to patient  Treatment with Cryotherapy    The doctor has treated your skin with nitrogen, which is 320 degrees Fahrenheit below zero  He has given the treated area "frostbite "    Stinging should subside within a few hours  You can take Tylenol for pain, if needed  Over the next few days, it is normal if the area becomes reddened, a blood blister, or swollen with fluid  If the lesion treated was near the eye - you could get a swollen eye over the next few days  Do not panic! This is all temporary, and will resolve with time  There is no special treatment - just keep the area clean  Makeup and BandAids can be used, if preferred  When the area starts to dry up and peel off, using Vaseline can help healing  It usually takes up to a month for it to heal   Some lesions are recurrent and may require repeat treatments  If a lesion has not healed in one month, please don't hesitate to contact us  If you have any further questions that are not answered here, please call us  [phone]    Thank you for allowing us to care for you

## 2021-01-04 NOTE — PROGRESS NOTES
[facility] DERMATOLOGY  [address] [place] [address]  [facility] [address]  [phone]  [phone]     MRN: [id number] : 1950  Encounter: [id number]  Patient Information: Lesia Iverson  Chief complaint:  Yearly Checkup    History of present illness:  42-year-old male presents for overall checkup concerned regarding potential skin cancer no specific concerns noted  Past Medical History:   Diagnosis Date    Colon polyp     Coronary artery disease     Diabetes mellitus ([address] )     Hyperlipidemia     Hypertension      Past Surgical History:   Procedure Laterality Date    CATARACT EXTRACTION      COLONOSCOPY      CORONARY ANGIOPLASTY WITH STENT PLACEMENT      CYST REMOVAL  2016    ILIAC ARTERY STENT Bilateral 2016     Social History   Social History     Substance and Sexual Activity   Alcohol Use Never    Frequency: Never     Social History     Substance and Sexual Activity   Drug Use Never     Social History     Tobacco Use   Smoking Status Former Smoker    Quit date:     Years since quittin 0   Smokeless Tobacco Never Used     History reviewed  No pertinent family history  Meds/Allergies   No Known Allergies    Meds:  Prior to Admission medications    Medication Sig Start Date End Date Taking?  Authorizing Provider   amLODIPine (NORVASC) 10 mg tablet Take 10 mg by mouth daily   Yes Historical Provider, MD   aspirin 325 mg tablet Take 325 mg by mouth   Yes Historical Provider, MD JUNG UF III MINI PEN NEEDLES 31G X 5 MM MISC  18  Yes Historical Provider, MD   Continuous Blood Gluc  (Nae[address] Kathya 14 Day [facility]) DIOGO Use 1 each every 14 (fourteen) days 20  Yes Historical Provider, MD   fluticasone Margreta Hearing) 50 mcg/act nasal spray  10/17/20  Yes Historical Provider, MD   FREESTYLE LITE test strip  12/3/18  Yes Historical Provider, MD   glimepiride (AMARYL) 2 mg tablet Take 2 mg by mouth 3/12/18  Yes Historical Provider, MD   insulin aspart (NovoLOG) 100 units/mL injection Inject 5 Units under the skin 3 (three) times a day with meals   Yes Historical Provider, MD   insulin detemir (LEVEMIR FLEXTOUCH) 100 Units/mL injection pen INJECT 21 UNITS AT BEDTIME 9/11/18  Yes Historical Provider, MD   loratadine (CLARITIN) 10 mg tablet loratadine 10 mg tablet   Yes Historical Provider, MD   metFORMIN (GLUCOPHAGE-XR) 750 mg 24 hr tablet Take 750 mg by mouth daily with breakfast   Yes Historical Provider, MD   metoprolol succinate (TOPROL-XL) 100 mg 24 hr tablet  11/1/18  Yes Historical Provider, MD   pantoprazole (PROTONIX) 40 mg tablet Take 1 tablet (40 mg total) by mouth daily 11/12/20  Yes Kait Kwong PA-C   ramipril (ALTACE) 10 MG capsule TAKE 1 CAPSULE TWICE A DAY 11/5/18  Yes Historical Provider, MD   rosuvastatin (CRESTOR) 10 MG tablet Take by mouth   Yes Historical Provider, MD   Silodosin 8 MG CAPS Take 8 mg by mouth daily 12/9/20 12/9/21 Yes Historical Provider, MD       Subjective:     Review of Systems:    General: negative for - chills, fatigue, fever,  weight gain or weight loss  Psychological: negative for - anxiety, behavioral disorder, concentration difficulties, decreased libido, depression, irritability, memory difficulties, mood swings, sleep disturbances or suicidal ideation  ENT: negative for - hearing difficulties , nasal congestion, nasal discharge, oral lesions, sinus pain, sneezing, sore throat  Allergy and Immunology: negative for - hives, insect bite sensitivity,  Hematological and Lymphatic: negative for - bleeding problems, blood clots,bruising, swollen lymph nodes  Endocrine: negative for - hair pattern changes, hot flashes, malaise/lethargy, mood swings, palpitations, polydipsia/polyuria, skin changes, temperature intolerance or unexpected weight change  Respiratory: negative for - cough, hemoptysis, orthopnea, shortness of breath, or wheezing  Cardiovascular: negative for - chest pain, dyspnea on exertion, edema,  Gastrointestinal: negative for - abdominal pain, nausea/vomiting  Genito-Urinary: negative for - dysuria, incontinence, irregular/heavy menses or urinary frequency/urgency  Musculoskeletal: negative for - gait disturbance, joint pain, joint stiffness, joint swelling, muscle pain, muscular weakness  Dermatological:  As in HPI  Neurological: negative for confusion, dizziness, headaches, impaired coordination/balance, memory loss, numbness/tingling, seizures, speech problems, tremors or weakness       Objective:   Temp 97 8 °F (36 6 °C)     Physical Exam:    General Appearance:    Alert, cooperative, no distress   Head:    Normocephalic, without obvious abnormality, atraumatic           Skin:   A full skin exam was performed including scalp, head scalp, eyes, ears, nose, lips, neck, chest, axilla, abdomen, back, buttocks, bilateral upper extremities, bilateral lower extremities, hands, feet, fingers, toes, fingernails, and toenails scaling erythematous area noted on the bridge mid bridge of the nose also pearly 2 mm papule noted on the right lower back      Physical Exam  HENT:      Head:          Shave Biopsy Procedure Note    Pre-operative Diagnosis:  Rule out basal carcinoma    Plan:  1  Instructed to keep the wound dry and covered for 24 and clean thereafter  2  Warning signs of infection were reviewed  3  Recommended that the patient use OTC acetaminophen as needed for pain  4  Return  Pending results of biopsy(ies)    Locations:  Right lower back    Indications:  Suspicious lesion    Anesthesia: Lidocaine 1% with epinephrine without added sodium bicarbonate    Procedure Details     Patient informed of the risks (including bleeding and infection) and benefits of the   procedure and Verbal informed consent obtained  The lesion and surrounding area were given a sterile prep using alcohol and draped in the usual sterile fashion  A Blue blade razor was used to obtain a specimen    Hemostasis achieved with aluminum chloride  Petrolatum and a sterile dressing applied  The specimen was sent for pathologic examination  The patient tolerated the procedure(s) well  Complications:  None  Cryotherapy Procedure Note    Pre-operative Diagnosis: actinic keratosis    Plan:  1  Instructed to keep the area dry and clean thereafter  Apply petrolatum if area gets crusty  2  Recommended that the patient use acetaminophen  as needed for pain  Locations:  Right lower back    Indications: Destruction of actinic keratosis x1  Patient informed of risks (permanent scarring, infection, light or dark discoloration, bleeding, infection, weakness, numbness and recurrence of the lesion) and benefits of the procedure and verbal informed consent obtained  The areas are treated with liquid nitrogen therapy, frozen until ice ball extended 2 mm beyond lesion, allowed to thaw, and treated again  The patient tolerated procedure well  The patient was instructed on post-op care, warned that there may be blister formation, redness and pain  Recommend OTC analgesia as needed for pain  Condition:  Stable    Complications:  none  Assessment:     1  Unknown skin lesion     2  Actinic keratosis     3  Seborrheic keratosis     4  Screening for skin condition           Plan:   Skin lesion possible basal cell carcinoma if positive would require curettage  Actinic Keratosis:  Patient advised lesions are precancers  These should resolve with cryosurgery if not to let us know sun block recommended  Seborrheic Keratosis  Patient reasurred these are normal growths we acquire with age no treatment needed    Screening for Dermatologic Disorders: Nothing else of concern noted on complete exam follow up in 1 year       Sandy Gonzales MD  1/4/2021,10:02 AM    Portions of the record may have been created with voice recognition software   Occasional wrong word or "sound a like" substitutions may have occurred due to the inherent limitations of voice recognition software   Read the chart carefully and recognize, using context, where substitutions have occurred

## 2021-01-06 ENCOUNTER — TELEPHONE (OUTPATIENT)
Dept: DERMATOLOGY | Facility: CLINIC | Age: 71
End: 2021-01-06

## 2021-01-06 NOTE — TELEPHONE ENCOUNTER
----- Message from Jennifer Yip MD sent at 1/6/2021 10:17 AM EST -----  Please call him of normal pathology

## 2021-03-05 DIAGNOSIS — Z23 ENCOUNTER FOR IMMUNIZATION: ICD-10-CM

## 2021-09-30 ENCOUNTER — PROCEDURE VISIT (OUTPATIENT)
Dept: DERMATOLOGY | Facility: CLINIC | Age: 71
End: 2021-09-30
Payer: MEDICARE

## 2021-09-30 DIAGNOSIS — L72.9 FOLLICULAR CYST OF SKIN: Primary | ICD-10-CM

## 2021-09-30 PROCEDURE — 88304 TISSUE EXAM BY PATHOLOGIST: CPT | Performed by: STUDENT IN AN ORGANIZED HEALTH CARE EDUCATION/TRAINING PROGRAM

## 2021-09-30 PROCEDURE — 11403 EXC TR-EXT B9+MARG 2.1-3CM: CPT | Performed by: DERMATOLOGY

## 2021-09-30 PROCEDURE — 12031 INTMD RPR S/A/T/EXT 2.5 CM/<: CPT | Performed by: DERMATOLOGY

## 2021-09-30 NOTE — PROGRESS NOTES
[address]  [address] [facility] [address]  [phone]  [phone]     MRN: [id number] : 1950  Encounter: [id number]  Patient Information: Tono Hess    Subjective:     77-year-old presents for planned excision of previous noted cyst on right chest patient was concerned since lesion has become larger and requested excision       Objective: There were no vitals taken for this visit  Physical Exam:    General Appearance:    Alert, cooperative, no distress   Skin:   Previously noted cyst right chest measures 21 mm in size  Procedure name:  Simple Excision with primary closure of cyst        Location:  Right chest    Diagnosis:  Follicular cyst    Size of lesion: 2 1 cm      I explained the diagnosis to the patient and we recommend an excision of the lesion for diagnosis and/or treatment  Potential complications include, but are not limited to: scarring, bleeding, infection, incomplete removal, recurrence, and nerve damage  The risks, benefits, and alternatives were discussed with the patient in detail  The location of the tumor was identified, circled, and confirmed by the patient  The correct patient, site, and procedure were confirmed  Anesthesia: 1% xylocaine with 1:100,000 epinephrine 3 cc  The patient was brought into the room, prepped and draped sterilely in the usual manner and anesthesia was administered by local infiltration  A linear incision was drawn across the lesion, and the area was incised to the cyst wall with a number 15c Bard-Magdi blade  The cyst was removed with sharp and blunt dissection  Hemostasis was achieved with cautery  A primary closure was obtained The wound was cleaned with hydrogen peroxide, dried off, petroleum applied, and the wound was covered with a pressure dressing  The patient was given detailed verbal and written instructions on postoperative care        Suture for adipose/fascial/dermal layer closure: 4-0  Vicryl 2 sutures    Suture used to approximate epidermis: 4-0  nylon 5 sutures    Final wound length: 2 3 cm    Follow-up in: 2 weeks  Assessment:     1  Follicular cyst of skin           Plan:   Wound care instructions given to patient      Prior to Admission medications    Medication Sig Start Date End Date Taking?  Authorizing Provider   amLODIPine (NORVASC) 10 mg tablet Take 10 mg by mouth daily   Yes Historical Provider, MD   aspirin 325 mg tablet Take 325 mg by mouth   Yes Historical Provider, MD JUNG UF III MINI PEN NEEDLES 31G X 5 MM MISC  11/22/18  Yes Historical Provider, MD   Continuous Blood Gluc  (FreeStyle Kathya 14 Day [place]) DIOGO Use 1 each every 14 (fourteen) days 8/13/20  Yes Historical Provider, MD   fluticasone Ele [place]) 50 mcg/act nasal spray  10/17/20  Yes Historical Provider, MD   FREESTYLE LITE test strip  12/3/18  Yes Historical Provider, MD   glimepiride (AMARYL) 2 mg tablet Take 2 mg by mouth 3/12/18  Yes Historical Provider, MD   insulin aspart (NovoLOG) 100 units/mL injection Inject 5 Units under the skin 3 (three) times a day with meals   Yes Historical Provider, MD   insulin detemir (LEVEMIR FLEXTOUCH) 100 Units/mL injection pen INJECT 21 UNITS AT BEDTIME 9/11/18  Yes Historical Provider, MD   loratadine (CLARITIN) 10 mg tablet loratadine 10 mg tablet   Yes Historical Provider, MD   metFORMIN (GLUCOPHAGE-XR) 750 mg 24 hr tablet Take 750 mg by mouth daily with breakfast   Yes Historical Provider, MD   metoprolol succinate (TOPROL-XL) 100 mg 24 hr tablet  11/1/18  Yes Historical Provider, MD   pantoprazole (PROTONIX) 40 mg tablet Take 1 tablet (40 mg total) by mouth daily 11/12/20  Yes Clementine Rutledge PA-C   ramipril (ALTACE) 10 MG capsule TAKE 1 CAPSULE TWICE A DAY 11/5/18  Yes Historical Provider, MD   rosuvastatin (CRESTOR) 10 MG tablet Take by mouth   Yes Historical Provider, MD   Silodosin 8 MG CAPS Take 8 mg by mouth daily 12/9/20 12/9/21 Yes Historical Provider, MD     No Known Allergies    Ghislaine Olivares MD  9/30/2021,2:42 PM    Portions of the record may have been created with voice recognition software   Occasional wrong word or "sound a like" substitutions may have occurred due to the inherent limitations of voice recognition software   Read the chart carefully and recognize, using context, where substitutions have occurred

## 2021-10-13 ENCOUNTER — CLINICAL SUPPORT (OUTPATIENT)
Dept: DERMATOLOGY | Facility: CLINIC | Age: 71
End: 2021-10-13

## 2021-10-13 DIAGNOSIS — L72.9 FOLLICULAR CYST OF SKIN: Primary | ICD-10-CM

## 2021-10-13 PROCEDURE — RECHECK: Performed by: DERMATOLOGY

## 2021-11-30 ENCOUNTER — OFFICE VISIT (OUTPATIENT)
Dept: DERMATOLOGY | Facility: CLINIC | Age: 71
End: 2021-11-30
Payer: MEDICARE

## 2021-11-30 VITALS — HEIGHT: 75 IN | BODY MASS INDEX: 26.73 KG/M2 | WEIGHT: 215 LBS

## 2021-11-30 DIAGNOSIS — Z13.89 SCREENING FOR SKIN CONDITION: ICD-10-CM

## 2021-11-30 DIAGNOSIS — L98.9 UNKNOWN SKIN LESION: Primary | ICD-10-CM

## 2021-11-30 DIAGNOSIS — L82.1 SEBORRHEIC KERATOSIS: ICD-10-CM

## 2021-11-30 PROCEDURE — 99214 OFFICE O/P EST MOD 30 MIN: CPT | Performed by: DERMATOLOGY

## 2021-11-30 PROCEDURE — 88305 TISSUE EXAM BY PATHOLOGIST: CPT | Performed by: PATHOLOGY

## 2021-11-30 PROCEDURE — 11102 TANGNTL BX SKIN SINGLE LES: CPT | Performed by: DERMATOLOGY

## 2021-11-30 RX ORDER — EMPAGLIFLOZIN 10 MG/1
TABLET, FILM COATED ORAL
COMMUNITY
Start: 2021-11-16

## 2021-12-08 ENCOUNTER — TELEPHONE (OUTPATIENT)
Dept: DERMATOLOGY | Facility: CLINIC | Age: 71
End: 2021-12-08

## 2022-01-11 ENCOUNTER — PROCEDURE VISIT (OUTPATIENT)
Dept: DERMATOLOGY | Facility: CLINIC | Age: 72
End: 2022-01-11
Payer: MEDICARE

## 2022-01-11 VITALS
SYSTOLIC BLOOD PRESSURE: 100 MMHG | BODY MASS INDEX: 27.23 KG/M2 | DIASTOLIC BLOOD PRESSURE: 58 MMHG | WEIGHT: 219 LBS | HEART RATE: 63 BPM | HEIGHT: 75 IN | TEMPERATURE: 99.3 F

## 2022-01-11 DIAGNOSIS — C44.219 BASAL CELL CARCINOMA (BCC) OF AURICLE OF LEFT EAR: ICD-10-CM

## 2022-01-11 PROCEDURE — 17311 MOHS 1 STAGE H/N/HF/G: CPT | Performed by: DERMATOLOGY

## 2022-01-11 PROCEDURE — 12051 INTMD RPR FACE/MM 2.5 CM/<: CPT | Performed by: DERMATOLOGY

## 2022-01-11 NOTE — PATIENT INSTRUCTIONS
Mohs Microscopic Surgery After Care    WOUND CARE AFTER SURGERY:    1  Do NOT to remove the pressure bandage for 48 hours  Keep the area clean and dry while this bandage is on  2  After removing the bandage for the first time, gently clean the area with soap and water  If the bandage is difficult to remove, getting the bandage wet in the shower will sometimes help soften the adhesive and allow it to be removed more easily  3  You will now need to cleanse this area daily in the shower with gentle soap  There is no need to scrub the area  You will need to apply plain Vaseline ointment (this is over the counter and not a prescription) to the site for up to 2 weeks followed by a clean appropriately sized bandage to area  Non stick dressing and paper tape (or Hypafix) are recommended for sensitive skin but a bandaid is fine if it covers the area well  4  All your stitches will dissolve over the next two weeks  You will need to keep these moist with Vaseline and covered with a bandage over the next 2 weeks for them to dissolve appropriately  RESTRICTIONS:     For two DAYS:   - You will need to take it very easy as this time is highest risk for bleeding  Being a "couch potato" during these two days is generally recommended  - For surgeries on the face/neck/scalp: Avoid leaning down to pick things up off the floor as this brings blood up to your head  Instead, squat down to pick things up  For two WEEKS:   - No heavy lifting (anything greater than 10 pounds)   - You can start to do slow, gentle activities such as slow walking but nothing to increase your heart rate and blood pressure too much (such as cardiovascular exercise)  It is important to take it easy as there is still a risk for bleeding and a high risk popping of stitches open during this time  If we did surgery near the eyes (including the nose, forehead, front part of your scalp, cheeks):  It is VERY common to get a large amount of swelling around your eyes (puffy eyes)  Although less frequent, this can be enough to swell your eyes shut and can also come along with bruising  This should not hurt and is very expected and normal  It is typically worst at ~ 3 days out from your surgery and dramatically better 1 week post-operatively  MANAGING YOUR PAIN AFTER SURGERY     You can expect to have some pain after surgery  This is normal  The pain is typically worse the first two days after surgery, and quickly begins to get better  The best strategy for controlling your pain after surgery is around the clock pain control  You can take over the counter Acetaminophen (Tylenol) for discomfort, if no contraindications  If you are taking this at the maximum dose, you can alternate this with Motrin (ibuprofen or Advil) as well  Alternating these medications with each other allows you to maximize your pain control  In addition to Tylenol and Motrin, you can use heating pads or ice packs on your incisions to help reduce your pain  How will I alternate your regular strength over-the-counter pain medication? You will take a dose of pain medication every three hours   Start by taking 650 mg of Tylenol (2 pills of 325 mg)   3 hours later take 600 mg of Motrin (3 pills of 200 mg)   3 hours after taking the Motrin take 650 mg of Tylenol   3 hours after that take 600 mg of Motrin  See example - if your first dose of Tylenol is at 12:00 PM     12:00 PM  Tylenol 650 mg (2 pills of 325 mg)    3:00 PM  Motrin 600 mg (3 pills of 200 mg)    6:00 PM  Tylenol 650 mg (2 pills of 325 mg)    9:00 PM  Motrin 600 mg (3 pills of 200 mg)    Continue alternating every 3 hours      Important:   Do not take more than 4000mg of Tylenol or 3200mg of Motrin in a 24-hour period  What if I still have pain?    If you have pain that is not controlled with the over-the-counter pain medications (Tylenol and Motrin or Advil), don't hesitate to call our staff using the number provided  We will help make sure you are managing your pain in the best way possible, and if necessary, we can provide a prescription for additional pain medication  CALL OUR OFFICE IMMEDIATELY FOR ANY SIGNS OF INFECTION:    This includes fever, chills, increased redness, warmth, tenderness, severe discomfort/pain, or pus or foul smell coming from the wound  [facility] directly at [phone] (SKIN)    IF BLEEDING IS NOTICED:    Place a clean cloth over the area and apply firm pressure for thirty minutes  Check the wound ONLY after 30 minutes of direct pressure; do not cheat and sneak a peak, as that does not count  If bleeding persists after 30 minutes of legitimate direct pressure, then try one more round of direct pressure to the area  Should the bleeding become heavier or not stop after the second attempt, call [facility] directly at [phone] (SKIN)  Your call will get routed to the dermatology surgeon on call even after hours

## 2022-01-11 NOTE — PROGRESS NOTES
MOHS Procedure Note    Patient: Jessica Baltazar  : 1950  MRN: [id number]  Date: 2022    History of Present Illness:  The patient is a 70 y o  male who presents with complaints of Basal Cell Carcinoma,left posterior postauricular of the ear    Past Medical History:   Diagnosis Date    Basal cell carcinoma 2021    left posterior auticular of the ear     Colon polyp     Coronary artery disease     Diabetes mellitus ([address] )     Hyperlipidemia     Hypertension        Past Surgical History:   Procedure Laterality Date    CATARACT EXTRACTION      COLONOSCOPY      CORONARY ANGIOPLASTY WITH STENT PLACEMENT      CYST REMOVAL  2016    ILIAC ARTERY STENT Bilateral 2016    MOHS SURGERY Left 2022    Left posterior auricular of the ear         Current Outpatient Medications:     amLODIPine (NORVASC) 10 mg tablet, Take 10 mg by mouth daily, Disp: , Rfl:     aspirin 325 mg tablet, Take 325 mg by mouth, Disp: , Rfl:     B-D UF III MINI PEN NEEDLES 31G X 5 MM MISC, , Disp: , Rfl:     Continuous Blood Gluc  (FreeStyle Kathya 14 Day [place]) DIOGO, Use 1 each every 14 (fourteen) days, Disp: , Rfl:     fluticasone (FLONASE) 50 mcg/act nasal spray, , Disp: , Rfl:     FREESTYLE LITE test strip, , Disp: , Rfl:     glimepiride (AMARYL) 2 mg tablet, Take 2 mg by mouth, Disp: , Rfl:     insulin aspart (NovoLOG) 100 units/mL injection, Inject 5 Units under the skin 3 (three) times a day with meals, Disp: , Rfl:     insulin detemir (LEVEMIR FLEXTOUCH) 100 Units/mL injection pen, INJECT 21 UNITS AT BEDTIME, Disp: , Rfl:     Jardiance 10 MG TABS, , Disp: , Rfl:     loratadine (CLARITIN) 10 mg tablet, loratadine 10 mg tablet, Disp: , Rfl:     metFORMIN (GLUCOPHAGE-XR) 750 mg 24 hr tablet, Take 750 mg by mouth daily with breakfast, Disp: , Rfl:     metoprolol succinate (TOPROL-XL) 100 mg 24 hr tablet, , Disp: , Rfl:     pantoprazole (PROTONIX) 40 mg tablet, Take 1 tablet (40 mg total) by mouth daily, Disp: 30 tablet, Rfl: 1    ramipril (ALTACE) 10 MG capsule, TAKE 1 CAPSULE TWICE A DAY, Disp: , Rfl:     rosuvastatin (CRESTOR) 10 MG tablet, Take by mouth, Disp: , Rfl:     Silodosin 8 MG CAPS, Take 8 mg by mouth daily, Disp: , Rfl:     No Known Allergies    Physical Exam:   Vitals:    01/11/22 0938   BP: 100/58   Pulse: 63   Temp: 99 3 °F (37 4 °C)     General: Awake, Alert, Oriented x 3, Mood and affect appropriate  Respiratory: Respirations even and unlabored  Cardiovascular: Peripheral pulses intact; no edema  Musculoskeletal Exam: N/A    Assessment: 0 8 cm x 0 7 cm pink scar, biopsy proven Basal Cell Carcinoma, nodular  Infiltrative type    Plan: MOHS    MOHS Procedure Timeout      Most Recent Value   Timeout: 0944   Patient Identity Verified: Yes   Correct Site Verified: Yes   Correct Procedure Verified: Yes          MOHS Diagnosis/Indication/Location/ID      Most Recent Value   Pathology Type Basal cell carcinoma   Anatomic Site --  [Left posterior  auricle of the ear ]   Indications for MOHS tumor location   MOHS ID [id number]          MOHS Site/Accession/Pre-Post      Most Recent Value   Original Site Identified (as submitted by referring clinician) Photo   Biopsy Accession/Specimen # (as submitted by referring clincian) [id number]   Pre-MOHS Size Length (cm) 0 8   Pre-MOHS Size Width (cm) 0 7   Post-MOHS Size-Length (cm) 1 1   Post MOHS Size-Width (cm) 1   Repair Type Intermediate layered closure   Suture Type Vicryl, Fast absorbing gut   Fast Absorbing Suture Size 5   Vicryl Suture Size 4   Final repair length (cm): 2 3   Anesthetic Used 1% Lidocaine with epinephrine          MOHS Tumor Stage 1 Information      Most Recent Value   Tissue Sections (blocks) 2   Microscopic Exam Section 1: No tumor identified in section  Microscopic Exam Section 2: No tumor identified in section  Tumor Clear After Stage I?  Yes                          Patient identified, procedure verified, site identified and verified  Time out completed  Surgical removal of the lesion discussed with the patient (risks and benefits, including possibility of scarring, infection, recurrence or potential for further treatment)  I have specifically identified the site with the patient  I have discussed the fact that the patient will have a scar after the procedure regardless of granulation or repair with sutures  I have discussed that the repair options can range from granulation in some cases to linear or curvilinear closures to larger flaps or grafts  There are sometimes flaps or grafts used that require multiples stages of surgery and will not be completed today, rather be completed over a series of appointments  I have discussed that occasionally due to location, size or depth of the lesion I may recommend consultation with and transfer of care for further removal or the reconstruction to another provider such as ophthalmology surgery, plastic surgery, ENT surgery, or surgical oncology  There are cases in which other testing such as imaging with MRI or CT scan or testing of lymph nodes is recommended because of the nature/depth/location of tumor seen during the removal  There is a risk of injury to nerves causing temporary or permanent numbness or the inability to move muscles full such as the inability to lift eyebrows  Questions answered and verbal and written consent was obtained  The tumor qualifies for Mohs based on AUC criteria  Dr Caty Monique served as the surgeon and pathologist during the procedure  With the patient in the supine position and under adequate local anesthesia with 1% lidocaine with epinephrine 1:100,000, the defect was scrubbed with Hibiclens  Sterile drapes were placed from the sterile tray  Because of the location of the surgical defect, an intermediate closure was judged to give the best possible cosmetic and functional result    The edges of the defect were carefully debrided removing any dead or coagulated tissue  Hemostasis was obtained by pinpoint electrocoagulation  Careful planning of removal of redundant tissue at either end of the defect was drawn out so that the suture lines would fall in the optimal orientation with regard to the relaxed skin tension lines  These were then removed with a #15 blade scalpel  The wound was then approximated by a deep layer of buried vertical mattress sutures and the cutaneous margins were approximated and closed by superficial sutures as noted above  Estimated blood loss was less than 5 mL  The patient tolerated the procedure well  The wound was dressed with petrolatum, a non-stick pad, and a compression dressing  Elvira Olmedo MD served as the surgeon and pathologist during the procedure  Postoperative care: Wound care discussed at length  I urged the patient to call us if any problems or question should arise  Complications: none  Post-op medications: none  Patient condition after procedure: stable    BCC cleared with 1 stage of mohs and repaired with 2 3cm closure  Well tolerated  No S/R needed as all dissolving sutures used          Scribe Attestation    I,:  Belén Watson MA am acting as a scribe while in the presence of the attending physician :       I,:  Elvira Olmedo MD personally performed the services described in this documentation    as scribed in my presence :

## 2022-01-22 ENCOUNTER — HOSPITAL ENCOUNTER (INPATIENT)
Facility: HOSPITAL | Age: 72
LOS: 1 days | Discharge: HOME | DRG: 066 | End: 2022-01-24
Attending: INTERNAL MEDICINE | Admitting: INTERNAL MEDICINE
Payer: MEDICARE

## 2022-01-22 ENCOUNTER — APPOINTMENT (EMERGENCY)
Dept: CT IMAGING | Facility: HOSPITAL | Age: 72
DRG: 066 | End: 2022-01-22
Payer: MEDICARE

## 2022-01-22 DIAGNOSIS — R29.898 UPPER EXTREMITY WEAKNESS: ICD-10-CM

## 2022-01-22 DIAGNOSIS — R29.90 STROKE-LIKE SYMPTOMS: Primary | ICD-10-CM

## 2022-01-22 DIAGNOSIS — I10 HYPERTENSION, UNSPECIFIED TYPE: ICD-10-CM

## 2022-01-22 DIAGNOSIS — R27.0: ICD-10-CM

## 2022-01-22 DIAGNOSIS — I25.10 CORONARY ARTERY DISEASE INVOLVING NATIVE HEART WITHOUT ANGINA PECTORIS, UNSPECIFIED VESSEL OR LESION TYPE: ICD-10-CM

## 2022-01-22 PROBLEM — E11.9 DIABETES MELLITUS, TYPE 2 (HCC): Status: ACTIVE | Noted: 2020-12-08

## 2022-01-22 LAB
2HR DELTA HS TROPONIN: 0 NG/L
4HR DELTA HS TROPONIN: 0 NG/L
ALBUMIN SERPL BCP-MCNC: 3.8 G/DL (ref 3.5–5)
ALP SERPL-CCNC: 81 U/L (ref 46–116)
ALT SERPL W P-5'-P-CCNC: 22 U/L (ref 12–78)
ANION GAP SERPL CALCULATED.3IONS-SCNC: 9 MMOL/L (ref 4–13)
AST SERPL W P-5'-P-CCNC: 20 U/L (ref 5–45)
BASOPHILS # BLD AUTO: 0.05 THOUSANDS/ΜL (ref 0–0.1)
BASOPHILS NFR BLD AUTO: 1 % (ref 0–1)
BILIRUB SERPL-MCNC: 0.25 MG/DL (ref 0.2–1)
BUN SERPL-MCNC: 22 MG/DL (ref 5–25)
CALCIUM SERPL-MCNC: 9.3 MG/DL (ref 8.3–10.1)
CARDIAC TROPONIN I PNL SERPL HS: 5 NG/L
CHLORIDE SERPL-SCNC: 106 MMOL/L (ref 100–108)
CO2 SERPL-SCNC: 24 MMOL/L (ref 21–32)
CREAT SERPL-MCNC: 0.96 MG/DL (ref 0.6–1.3)
EOSINOPHIL # BLD AUTO: 0.16 THOUSAND/ΜL (ref 0–0.61)
EOSINOPHIL NFR BLD AUTO: 2 % (ref 0–6)
ERYTHROCYTE [DISTWIDTH] IN BLOOD BY AUTOMATED COUNT: 12.8 % (ref 11.6–15.1)
FLUAV RNA RESP QL NAA+PROBE: NEGATIVE
FLUBV RNA RESP QL NAA+PROBE: NEGATIVE
GFR SERPL CREATININE-BSD FRML MDRD: 79 ML/MIN/1.73SQ M
GLUCOSE SERPL-MCNC: 101 MG/DL (ref 65–140)
HCT VFR BLD AUTO: 44.3 % (ref 36.5–49.3)
HGB BLD-MCNC: 15.1 G/DL (ref 12–17)
IMM GRANULOCYTES # BLD AUTO: 0.02 THOUSAND/UL (ref 0–0.2)
IMM GRANULOCYTES NFR BLD AUTO: 0 % (ref 0–2)
LYMPHOCYTES # BLD AUTO: 3.33 THOUSANDS/ΜL (ref 0.6–4.47)
LYMPHOCYTES NFR BLD AUTO: 37 % (ref 14–44)
MCH RBC QN AUTO: 32.3 PG (ref 26.8–34.3)
MCHC RBC AUTO-ENTMCNC: 34.1 G/DL (ref 31.4–37.4)
MCV RBC AUTO: 95 FL (ref 82–98)
MONOCYTES # BLD AUTO: 0.86 THOUSAND/ΜL (ref 0.17–1.22)
MONOCYTES NFR BLD AUTO: 9 % (ref 4–12)
NEUTROPHILS # BLD AUTO: 4.71 THOUSANDS/ΜL (ref 1.85–7.62)
NEUTS SEG NFR BLD AUTO: 51 % (ref 43–75)
NRBC BLD AUTO-RTO: 0 /100 WBCS
PLATELET # BLD AUTO: 254 THOUSANDS/UL (ref 149–390)
PMV BLD AUTO: 9.6 FL (ref 8.9–12.7)
POTASSIUM SERPL-SCNC: 3.9 MMOL/L (ref 3.5–5.3)
PROT SERPL-MCNC: 7 G/DL (ref 6.4–8.2)
RBC # BLD AUTO: 4.68 MILLION/UL (ref 3.88–5.62)
RSV RNA RESP QL NAA+PROBE: NEGATIVE
SARS-COV-2 RNA RESP QL NAA+PROBE: NEGATIVE
SODIUM SERPL-SCNC: 139 MMOL/L (ref 136–145)
WBC # BLD AUTO: 9.13 THOUSAND/UL (ref 4.31–10.16)

## 2022-01-22 PROCEDURE — 1123F ACP DISCUSS/DSCN MKR DOCD: CPT | Performed by: NURSE PRACTITIONER

## 2022-01-22 PROCEDURE — 99285 EMERGENCY DEPT VISIT HI MDM: CPT | Performed by: NURSE PRACTITIONER

## 2022-01-22 PROCEDURE — 70496 CT ANGIOGRAPHY HEAD: CPT

## 2022-01-22 PROCEDURE — 70498 CT ANGIOGRAPHY NECK: CPT

## 2022-01-22 PROCEDURE — 85025 COMPLETE CBC W/AUTO DIFF WBC: CPT | Performed by: NURSE PRACTITIONER

## 2022-01-22 PROCEDURE — 84484 ASSAY OF TROPONIN QUANT: CPT

## 2022-01-22 PROCEDURE — 36415 COLL VENOUS BLD VENIPUNCTURE: CPT | Performed by: NURSE PRACTITIONER

## 2022-01-22 PROCEDURE — 0241U HB NFCT DS VIR RESP RNA 4 TRGT: CPT | Performed by: NURSE PRACTITIONER

## 2022-01-22 PROCEDURE — 80053 COMPREHEN METABOLIC PANEL: CPT | Performed by: NURSE PRACTITIONER

## 2022-01-22 PROCEDURE — 99220 PR INITIAL OBSERVATION CARE/DAY 70 MINUTES: CPT

## 2022-01-22 PROCEDURE — 99285 EMERGENCY DEPT VISIT HI MDM: CPT

## 2022-01-22 PROCEDURE — 84484 ASSAY OF TROPONIN QUANT: CPT | Performed by: NURSE PRACTITIONER

## 2022-01-22 PROCEDURE — 93005 ELECTROCARDIOGRAM TRACING: CPT

## 2022-01-22 RX ORDER — LORATADINE 10 MG/1
10 TABLET ORAL DAILY
Status: DISCONTINUED | OUTPATIENT
Start: 2022-01-23 | End: 2022-01-24 | Stop reason: HOSPADM

## 2022-01-22 RX ORDER — FLUTICASONE PROPIONATE 50 MCG
2 SPRAY, SUSPENSION (ML) NASAL DAILY
Status: DISCONTINUED | OUTPATIENT
Start: 2022-01-23 | End: 2022-01-24 | Stop reason: HOSPADM

## 2022-01-22 RX ORDER — ATORVASTATIN CALCIUM 40 MG/1
40 TABLET, FILM COATED ORAL EVERY EVENING
Status: DISCONTINUED | OUTPATIENT
Start: 2022-01-22 | End: 2022-01-24 | Stop reason: HOSPADM

## 2022-01-22 RX ORDER — ACETAMINOPHEN 325 MG/1
650 TABLET ORAL EVERY 6 HOURS PRN
Status: DISCONTINUED | OUTPATIENT
Start: 2022-01-22 | End: 2022-01-24 | Stop reason: HOSPADM

## 2022-01-22 RX ORDER — ASPIRIN 325 MG
325 TABLET ORAL DAILY
Status: DISCONTINUED | OUTPATIENT
Start: 2022-01-23 | End: 2022-01-24

## 2022-01-22 RX ORDER — TAMSULOSIN HYDROCHLORIDE 0.4 MG/1
0.8 CAPSULE ORAL
Status: DISCONTINUED | OUTPATIENT
Start: 2022-01-23 | End: 2022-01-24 | Stop reason: HOSPADM

## 2022-01-22 RX ORDER — PANTOPRAZOLE SODIUM 40 MG/1
40 TABLET, DELAYED RELEASE ORAL DAILY
Status: DISCONTINUED | OUTPATIENT
Start: 2022-01-23 | End: 2022-01-24 | Stop reason: HOSPADM

## 2022-01-22 RX ADMIN — IOHEXOL 85 ML: 350 INJECTION, SOLUTION INTRAVENOUS at 19:07

## 2022-01-22 RX ADMIN — INSULIN DETEMIR 10 UNITS: 100 INJECTION, SOLUTION SUBCUTANEOUS at 21:28

## 2022-01-22 RX ADMIN — ATORVASTATIN CALCIUM 40 MG: 40 TABLET, FILM COATED ORAL at 21:04

## 2022-01-22 NOTE — ED PROVIDER NOTES
History  Chief Complaint   Patient presents with    Extremity Weakness     pt states around 1615 to get a bowl of ice cream and couldnt lift with right hand  pt states to have weakness/tingling in right hand that has subsided  pt denies further symptoms at this time  This is a 66-year-old male patient presents here with chief complaint of difficulty using his right hand  He reports that around 415 today he was attempting to eat ice cream and could not control the spoon and could not even use his hand  He reports symptoms lasted less than an hour and he has returned mostly to normal with just some sensory complaints  He denies any other weakness or difficulty speaking  Does have a known history of vascular disease with lower extremity stenting as well as cardiac stents          Prior to Admission Medications   Prescriptions Last Dose Informant Patient Reported? Taking?    B-D UF III MINI PEN NEEDLES 31G X 5 MM MISC  Self Yes No   Continuous Blood Gluc  (FreeStyle Kathya 14 Day [place]) DIOGO  Self Yes No   Sig: Use 1 each every 14 (fourteen) days   FREESTYLE LITE test strip  Self Yes No   Jardiance 10 MG TABS   Yes No   Silodosin 8 MG CAPS  Self Yes No   Sig: Take 8 mg by mouth daily   amLODIPine (NORVASC) 10 mg tablet  Self Yes No   Sig: Take 10 mg by mouth daily   aspirin 325 mg tablet  Self Yes No   Sig: Take 325 mg by mouth   fluticasone (FLONASE) 50 mcg/act nasal spray  Self Yes No   glimepiride (AMARYL) 2 mg tablet  Self Yes No   Sig: Take 2 mg by mouth   insulin aspart (NovoLOG) 100 units/mL injection  Self Yes No   Sig: Inject 5 Units under the skin 3 (three) times a day with meals   insulin detemir (LEVEMIR FLEXTOUCH) 100 Units/mL injection pen  Self Yes No   Sig: INJECT 21 UNITS AT BEDTIME   loratadine (CLARITIN) 10 mg tablet  Self Yes No   Sig: loratadine 10 mg tablet   metFORMIN (GLUCOPHAGE-XR) 750 mg 24 hr tablet  Self Yes No   Sig: Take 750 mg by mouth daily with breakfast   metoprolol succinate (TOPROL-XL) 100 mg 24 hr tablet  Self Yes No   pantoprazole (PROTONIX) 40 mg tablet  Self No No   Sig: Take 1 tablet (40 mg total) by mouth daily   ramipril (ALTACE) 10 MG capsule  Self Yes No   Sig: TAKE 1 CAPSULE TWICE A DAY   rosuvastatin (CRESTOR) 10 MG tablet  Self Yes No   Sig: Take by mouth      Facility-Administered Medications: None       Past Medical History:   Diagnosis Date    Basal cell carcinoma 2021    left posterior auticular of the ear     Colon polyp     Coronary artery disease     Diabetes mellitus ([address] )     Hyperlipidemia     Hypertension        Past Surgical History:   Procedure Laterality Date    CATARACT EXTRACTION      COLONOSCOPY      CORONARY ANGIOPLASTY WITH STENT PLACEMENT  2003    CYST REMOVAL  2016    ILIAC ARTERY STENT Bilateral 2016    MOHS SURGERY Left 2022    Left posterior auricular of the ear       Family History   Problem Relation Age of Onset    Diabetes Father     Diabetes Sister     Basal cell carcinoma Daughter      I have reviewed and agree with the history as documented  E-Cigarette/Vaping    E-Cigarette Use Never User      E-Cigarette/Vaping Substances    Nicotine No     THC No     CBD No     Flavoring No     Other No     Unknown No      Social History     Tobacco Use    Smoking status: Former Smoker     Quit date:      Years since quittin 0    Smokeless tobacco: Never Used   Vaping Use    Vaping Use: Never used   Substance Use Topics    Alcohol use: Never    Drug use: Never       Review of Systems   Constitutional: Negative for diaphoresis, fatigue and fever  HENT: Negative for congestion, ear pain, nosebleeds and sore throat  Eyes: Negative for photophobia, pain, discharge and visual disturbance  Respiratory: Negative for cough, choking, chest tightness, shortness of breath and wheezing  Cardiovascular: Negative for chest pain and palpitations     Gastrointestinal: Negative for abdominal distention, abdominal pain, diarrhea and vomiting  Genitourinary: Negative for dysuria, flank pain and frequency  Musculoskeletal: Negative for back pain, gait problem and joint swelling  Skin: Negative for color change and rash  Neurological: Positive for weakness  Negative for dizziness, syncope and headaches  Psychiatric/Behavioral: Negative for behavioral problems and confusion  The patient is not nervous/anxious  All other systems reviewed and are negative  Physical Exam  Physical Exam  Vitals and nursing note reviewed  Constitutional:       General: He is not in acute distress  Appearance: He is well-developed  HENT:      Head: Normocephalic and atraumatic  Eyes:      General:         Right eye: No discharge  Left eye: No discharge  Conjunctiva/sclera: Conjunctivae normal    Cardiovascular:      Rate and Rhythm: Normal rate  Pulmonary:      Effort: Pulmonary effort is normal  No respiratory distress  Abdominal:      General: There is no distension  Tenderness: There is no guarding  Musculoskeletal:         General: No deformity  Cervical back: Normal range of motion and neck supple  Skin:     General: Skin is warm and dry  Neurological:      General: No focal deficit present  Mental Status: He is alert and oriented to person, place, and time  Motor: No weakness        Coordination: Coordination normal       Gait: Gait normal          Vital Signs  ED Triage Vitals   Temperature Pulse Respirations Blood Pressure SpO2   01/22/22 1732 01/22/22 1732 01/22/22 1732 01/22/22 1732 01/22/22 1732   98 2 °F (36 8 °C) 64 17 142/93 97 %      Temp Source Heart Rate Source Patient Position - Orthostatic VS BP Location FiO2 (%)   01/22/22 1732 01/22/22 1732 01/22/22 1732 01/22/22 1732 --   Temporal Monitor Sitting Right arm       Pain Score       01/22/22 2055       No Pain           Vitals:    01/22/22 1732 01/22/22 2055   BP: 142/93 150/68   Pulse: 64 55 Patient Position - Orthostatic VS: Sitting          Visual Acuity  Visual Acuity      Most Recent Value   L Pupil Size (mm) 3   R Pupil Size (mm) 3          ED Medications  Medications   aspirin tablet 325 mg (has no administration in time range)   fluticasone (FLONASE) 50 mcg/act nasal spray 2 spray (has no administration in time range)   insulin lispro (HumaLOG) 100 units/mL subcutaneous injection 5 Units (has no administration in time range)   loratadine (CLARITIN) tablet 10 mg (has no administration in time range)   pantoprazole (PROTONIX) EC tablet 40 mg (has no administration in time range)   tamsulosin (FLOMAX) capsule 0 8 mg (has no administration in time range)   acetaminophen (TYLENOL) tablet 650 mg (has no administration in time range)   atorvastatin (LIPITOR) tablet 40 mg (40 mg Oral Given 1/22/22 2104)   enoxaparin (LOVENOX) subcutaneous injection 40 mg (has no administration in time range)   insulin lispro (HumaLOG) 100 units/mL subcutaneous injection 1-6 Units (has no administration in time range)   insulin lispro (HumaLOG) 100 units/mL subcutaneous injection 1-5 Units (1 Units Subcutaneous Not Given 1/22/22 2105)   insulin detemir (LEVEMIR) subcutaneous injection 10 Units (10 Units Subcutaneous Given 1/22/22 2128)   iohexol (OMNIPAQUE) 350 MG/ML injection (SINGLE-DOSE) 85 mL (85 mL Intravenous Given 1/22/22 1907)       Diagnostic Studies  Results Reviewed     Procedure Component Value Units Date/Time    HS Troponin I 2hr [[id number]] Collected: 01/22/22 2028    Lab Status: Final result Specimen: Blood from Line, Venous Updated: 01/22/22 2053     hs TnI 2hr 5 ng/L      Delta 2hr hsTnI 0 ng/L     HS Troponin I 4hr [[id number]]     Lab Status: No result Specimen: Blood     COVID/FLU/RSV - 2 hour TAT [[id number]]  (Normal) Collected: 01/22/22 1828    Lab Status: Final result Specimen: Nares from Nose Updated: 01/22/22 1915     SARS-CoV-2 Negative     INFLUENZA A PCR Negative     INFLUENZA B PCR Negative RSV PCR Negative    Narrative:      FOR PEDIATRIC PATIENTS - copy/paste COVID Guidelines URL to browser: [email or web address]    SARS-CoV-2 assay is a Nucleic Acid Amplification assay intended for the  qualitative detection of nucleic acid from SARS-CoV-2 in nasopharyngeal  swabs  Results are for the presumptive identification of SARS-CoV-2 RNA  Positive results are indicative of infection with SARS-CoV-2, the virus  causing COVID-19, but do not rule out bacterial infection or co-infection  with other viruses  Laboratories within the [place] and its  territories are required to report all positive results to the appropriate  public health authorities  Negative results do not preclude SARS-CoV-2  infection and should not be used as the sole basis for treatment or other  patient management decisions  Negative results must be combined with  clinical observations, patient history, and epidemiological information  This test has not been FDA cleared or approved  This test has been authorized by FDA under an Emergency Use Authorization  (EUA)  This test is only authorized for the duration of time the  declaration that circumstances exist justifying the authorization of the  emergency use of an in vitro diagnostic tests for detection of SARS-CoV-2  virus and/or diagnosis of COVID-19 infection under section 564(b)(1) of  the Act, 21 U  S C  680NYM-7(F)(9), unless the authorization is terminated  or revoked sooner  The test has been validated but independent review by FDA  and CLIA is pending  Test performed using [employer]: This RT-PCR assay targets N2,  a region unique to SARS-CoV-2  A conserved region in the E-gene was chosen  for pan-Sarbecovirus detection which includes SARS-CoV-2      HS Troponin 0hr (reflex protocol) [[id number]] Collected: 01/22/22 1828    Lab Status: Final result Specimen: Blood from Line, Venous Updated: 01/22/22 1905 hs TnI 0hr 5 ng/L     Comprehensive metabolic panel [[id number]] Collected: 01/22/22 1828    Lab Status: Final result Specimen: Blood from Line, Venous Updated: 01/22/22 1853     Sodium 139 mmol/L      Potassium 3 9 mmol/L      Chloride 106 mmol/L      CO2 24 mmol/L      ANION GAP 9 mmol/L      BUN 22 mg/dL      Creatinine 0 96 mg/dL      Glucose 101 mg/dL      Calcium 9 3 mg/dL      AST 20 U/L      ALT 22 U/L      Alkaline Phosphatase 81 U/L      Total Protein 7 0 g/dL      Albumin 3 8 g/dL      Total Bilirubin 0 25 mg/dL      eGFR 79 ml/min/1 73sq m     Narrative:      National Kidney Disease Foundation guidelines for Chronic Kidney Disease (CKD):     Stage 1 with normal or high GFR (GFR > 90 mL/min/1 73 square meters)    Stage 2 Mild CKD (GFR = 60-89 mL/min/1 73 square meters)    Stage 3A Moderate CKD (GFR = 45-59 mL/min/1 73 square meters)    Stage 3B Moderate CKD (GFR = 30-44 mL/min/1 73 square meters)    Stage 4 Severe CKD (GFR = 15-29 mL/min/1 73 square meters)    Stage 5 End Stage CKD (GFR <15 mL/min/1 73 square meters)  Note: GFR calculation is accurate only with a steady state creatinine    CBC and differential [[id number]] Collected: 01/22/22 1828    Lab Status: Final result Specimen: Blood from Line, Venous Updated: 01/22/22 1835     WBC 9 13 Thousand/uL      RBC 4 68 Million/uL      Hemoglobin 15 1 g/dL      Hematocrit 44 3 %      MCV 95 fL      MCH 32 3 pg      MCHC 34 1 g/dL      RDW 12 8 %      MPV 9 6 fL      Platelets 426 Thousands/uL      nRBC 0 /100 WBCs      Neutrophils Relative 51 %      Immat GRANS % 0 %      Lymphocytes Relative 37 %      Monocytes Relative 9 %      Eosinophils Relative 2 %      Basophils Relative 1 %      Neutrophils Absolute 4 71 Thousands/µL      Immature Grans Absolute 0 02 Thousand/uL      Lymphocytes Absolute 3 33 Thousands/µL      Monocytes Absolute 0 86 Thousand/µL      Eosinophils Absolute 0 16 Thousand/µL      Basophils Absolute 0 05 Thousands/µL CTA head and neck with and without contrast   Final Result by Lily Silva MD (01/22 1935)         1  No evidence of acute internal hemorrhage  2  Severe plaque stenosis at the origin of the right vertebral artery  3   2 8 x 1 2 cm soft tissue nodule /region identified in the posterior subcutaneous region at the level of C2, correlate clinically  Workstation performed: [id number]         MRI Inpatient Order    (Results Pending)              Procedures  Procedures         ED Course               Identification of Seniors at Risk      Most Recent Value   (ISAR) Identification of Seniors at Risk    Before the illness or injury that brought you to the Emergency, did you need someone to help you on a regular basis? 0 Filed at: 01/22/2022 1733   In the last 24 hours, have you needed more help than usual? 0 Filed at: 01/22/2022 1733   Have you been hospitalized for one or more nights during the past 6 months? 0 Filed at: 01/22/2022 1733   In general, do you see well? 0 Filed at: 01/22/2022 1733   In general, do you have serious problems with your memory? 0 Filed at: 01/22/2022 1733   Do you take more than three different medications every day? 1 Filed at: 01/22/2022 1733   ISAR Score 1 Filed at: 01/22/2022 1733                      SBIRT 20yo+      Most Recent Value   SBIRT (25 yo +)    In order to provide better care to our patients, we are screening all of our patients for alcohol and drug use  Would it be okay to ask you these screening questions? Yes Filed at: 01/22/2022 1751   Initial Alcohol Screen: US AUDIT-C     1  How often do you have a drink containing alcohol? 0 Filed at: 01/22/2022 1751   2  How many drinks containing alcohol do you have on a typical day you are drinking? 0 Filed at: 01/22/2022 1751   3a  Male UNDER 65: How often do you have five or more drinks on one occasion? 0 Filed at: 01/22/2022 1751   3b  FEMALE Any Age, or MALE 65+:  How often do you have 4 or more drinks on one occassion? 0 Filed at: 01/22/2022 1751   Audit-C Score 0 Filed at: 01/22/2022 1751   GEORGE: How many times in the past year have you    Used an illegal drug or used a prescription medication for non-medical reasons? Never Filed at: 01/22/2022 1751                    [facility]  Number of Diagnoses or Management Options  Limb ataxia in one extremity: new and requires workup  Upper extremity weakness: new and requires workup  Diagnosis management comments: Symptoms are concerning for transient ischemia especially with a history of vascular disease  Will bring in to follow stroke pathway  Not a candidate for tPA given the resolution of symptoms  Amount and/or Complexity of Data Reviewed  Clinical lab tests: ordered and reviewed  Tests in the radiology section of CPT®: ordered and reviewed  Tests in the medicine section of CPT®: reviewed and ordered  Independent visualization of images, tracings, or specimens: yes    Patient Progress  Patient progress: improved      Disposition  Final diagnoses:   Upper extremity weakness   Limb ataxia in one extremity     Time reflects when diagnosis was documented in both MDM as applicable and the Disposition within this note     Time User Action Codes Description Comment    1/22/2022  8:35 PM Elo Nielsen [R29 [id number]] Stroke-like symptoms     1/22/2022  9:30 PM Thierno Morillo [R29 [id number]] Upper extremity weakness     1/22/2022  9:30 PM Thierno Morillo [R27 0] Limb ataxia in one extremity       ED Disposition     ED Disposition Condition Date/Time Comment    Admit Stable Sat Jan 22, 2022  8:14 PM Case was discussed with Andie Rivera and the patient's admission status was agreed to be Admission Status: observation status to the service of Dr Jarvis Elder   Follow-up Information    None         Patient's Medications   Discharge Prescriptions    No medications on file       No discharge procedures on file      PDMP Review     None          ED Provider  Electronically Signed by FEDE Hernandez  01/22/22 9683

## 2022-01-23 ENCOUNTER — APPOINTMENT (OUTPATIENT)
Dept: MRI IMAGING | Facility: HOSPITAL | Age: 72
DRG: 066 | End: 2022-01-23
Payer: MEDICARE

## 2022-01-23 ENCOUNTER — APPOINTMENT (OUTPATIENT)
Dept: NON INVASIVE DIAGNOSTICS | Facility: HOSPITAL | Age: 72
DRG: 066 | End: 2022-01-23
Payer: MEDICARE

## 2022-01-23 LAB
ATRIAL RATE: 56 BPM
CHOLEST SERPL-MCNC: 145 MG/DL
EST. AVERAGE GLUCOSE BLD GHB EST-MCNC: 151 MG/DL
GLUCOSE SERPL-MCNC: 107 MG/DL (ref 65–140)
GLUCOSE SERPL-MCNC: 112 MG/DL (ref 65–140)
GLUCOSE SERPL-MCNC: 115 MG/DL (ref 65–140)
GLUCOSE SERPL-MCNC: 157 MG/DL (ref 65–140)
GLUCOSE SERPL-MCNC: 168 MG/DL (ref 65–140)
GLUCOSE SERPL-MCNC: 184 MG/DL (ref 65–140)
HBA1C MFR BLD: 6.9 %
HDLC SERPL-MCNC: 35 MG/DL
LDLC SERPL CALC-MCNC: 82 MG/DL (ref 0–100)
P AXIS: 70 DEGREES
PR INTERVAL: 228 MS
QRS AXIS: 16 DEGREES
QRSD INTERVAL: 98 MS
QT INTERVAL: 434 MS
QTC INTERVAL: 418 MS
T WAVE AXIS: 61 DEGREES
TRIGL SERPL-MCNC: 139 MG/DL
VENTRICULAR RATE: 56 BPM

## 2022-01-23 PROCEDURE — 93010 ELECTROCARDIOGRAM REPORT: CPT | Performed by: INTERNAL MEDICINE

## 2022-01-23 PROCEDURE — 36415 COLL VENOUS BLD VENIPUNCTURE: CPT

## 2022-01-23 PROCEDURE — 70551 MRI BRAIN STEM W/O DYE: CPT

## 2022-01-23 PROCEDURE — 99285 EMERGENCY DEPT VISIT HI MDM: CPT | Performed by: NURSE PRACTITIONER

## 2022-01-23 PROCEDURE — 93306 TTE W/DOPPLER COMPLETE: CPT | Performed by: INTERNAL MEDICINE

## 2022-01-23 PROCEDURE — 80061 LIPID PANEL: CPT

## 2022-01-23 PROCEDURE — 99233 SBSQ HOSP IP/OBS HIGH 50: CPT | Performed by: INTERNAL MEDICINE

## 2022-01-23 PROCEDURE — 93306 TTE W/DOPPLER COMPLETE: CPT

## 2022-01-23 PROCEDURE — 83036 HEMOGLOBIN GLYCOSYLATED A1C: CPT

## 2022-01-23 PROCEDURE — 82948 REAGENT STRIP/BLOOD GLUCOSE: CPT

## 2022-01-23 PROCEDURE — G1004 CDSM NDSC: HCPCS

## 2022-01-23 RX ORDER — CLOPIDOGREL BISULFATE 75 MG/1
75 TABLET ORAL DAILY
Status: DISCONTINUED | OUTPATIENT
Start: 2022-01-23 | End: 2022-01-24 | Stop reason: HOSPADM

## 2022-01-23 RX ADMIN — INSULIN LISPRO 5 UNITS: 100 INJECTION, SOLUTION INTRAVENOUS; SUBCUTANEOUS at 14:06

## 2022-01-23 RX ADMIN — INSULIN LISPRO 1 UNITS: 100 INJECTION, SOLUTION INTRAVENOUS; SUBCUTANEOUS at 09:38

## 2022-01-23 RX ADMIN — INSULIN LISPRO 1 UNITS: 100 INJECTION, SOLUTION INTRAVENOUS; SUBCUTANEOUS at 16:46

## 2022-01-23 RX ADMIN — INSULIN LISPRO 5 UNITS: 100 INJECTION, SOLUTION INTRAVENOUS; SUBCUTANEOUS at 09:40

## 2022-01-23 RX ADMIN — PANTOPRAZOLE SODIUM 40 MG: 40 TABLET, DELAYED RELEASE ORAL at 09:40

## 2022-01-23 RX ADMIN — CLOPIDOGREL BISULFATE 75 MG: 75 TABLET ORAL at 14:06

## 2022-01-23 RX ADMIN — ENOXAPARIN SODIUM 40 MG: 40 INJECTION SUBCUTANEOUS at 09:40

## 2022-01-23 RX ADMIN — ATORVASTATIN CALCIUM 40 MG: 40 TABLET, FILM COATED ORAL at 17:14

## 2022-01-23 RX ADMIN — ASPIRIN 325 MG ORAL TABLET 325 MG: 325 PILL ORAL at 09:40

## 2022-01-23 RX ADMIN — INSULIN DETEMIR 10 UNITS: 100 INJECTION, SOLUTION SUBCUTANEOUS at 23:00

## 2022-01-23 RX ADMIN — INSULIN LISPRO 1 UNITS: 100 INJECTION, SOLUTION INTRAVENOUS; SUBCUTANEOUS at 12:09

## 2022-01-23 RX ADMIN — TAMSULOSIN HYDROCHLORIDE 0.8 MG: 0.4 CAPSULE ORAL at 16:41

## 2022-01-23 NOTE — ASSESSMENT & PLAN NOTE
Lab Results   Component Value Date    HGBA1C 7 0 (H) 12/20/2021     · Blood glucose 101  · Ordering half dose of home detemir (equivalent) as patients latest blood glucose was euglycemic, and will continuenovolog 5U TID w/ meals  · Correctional SSI  · Hypoglycemia protocol

## 2022-01-23 NOTE — ASSESSMENT & PLAN NOTE
Lab Results   Component Value Date    HGBA1C 7 0 (H) 12/20/2021     · Resume home regimen upon discharge

## 2022-01-23 NOTE — ASSESSMENT & PLAN NOTE
· S/p PTCA in 2003  · Denies chest pain  · Troponin 5  · EKG sinus bradycardia HR 56 with anterior T-wave abnormality  · Continue home ASA 325mg daily and statin  · Holding home metoprolol in setting of permissive hypertension per stroke protocol

## 2022-01-23 NOTE — ASSESSMENT & PLAN NOTE
Patient reports trying to eat a bowl of ice cream this afternoon and was unable to lift or properly manipulate the spoon 2/2 right hand weakness and associated tingling  Weakness has subsided at this time and patient is currently only complaining of his right 3rd and 4th finger "feeling different than the other hand " Denies fever, chill, recent sick contact, headache, visual disturbance, dizzy/lightheadedness, syncope, chest pain, palpitations, SOB, ABD pain, N/V/D, current numbness/tingling/weakness, lower extremity pain/swelling, or other symptoms      /93 (BP Location: Right arm)   Pulse 64   Temp 98 2 °F (36 8 °C) (Temporal)   Resp 17   Wt 99 8 kg (220 lb)   SpO2 97%   BMI 27 50 kg/m²     · Unable to lift bowl earlier 2/2 right upper extremity weakness and tingling  · Weakness and majority of tingling has subsided and patient currently reports that his right 3rd and 4th finger "feel different than the other hand"  · CTA head/neck showing severe plaque stenosis at the origin of the right vertebral artery and a 2 8 x 1 2 cm soft tissue nodule in the posterior subcutaneous region at the level of C2 (nodule unable to be appreciated on palpation), without other abnormality  · Physical exam without focal neurologic deficit at this time  · EKG sinus bradycardia HR 56 w/anterior T-wave abnormality  · HS troponin 0H:5  · Afebrile; no leuokocytosis; no acute electrolyte abnormalities; not hypoglycemic  · Echo and MRI ordered  · Will follow stroke protocol overnight  · Continue home  mg daily and atorvastatin per stroke protocol, permissive hypertension and neuro checks per stroke protocol  · Neurology consulted

## 2022-01-23 NOTE — ASSESSMENT & PLAN NOTE
The patient reports he is a diabetic now for several years  He has a controlled A1c at 7 0  He does have some subjective complaints as well as objective findings of diabetic neuropathy his feet bilaterally

## 2022-01-23 NOTE — H&P
[address] [facility]  H&P Perico Ba 1950, 70 y o  male MRN: [id number]  Unit/Bed#: FT 02 Encounter: [id number]  Primary Care Provider: Opal Gregory MD   Date and time admitted to hospital: 1/22/2022  5:33 PM    * Stroke-like symptoms  Assessment & Plan  Patient reports trying to eat a bowl of ice cream this afternoon and was unable to lift or properly manipulate the spoon 2/2 right hand weakness and associated tingling  Weakness has subsided at this time and patient is currently only complaining of his right 3rd and 4th finger "feeling different than the other hand " Denies fever, chill, recent sick contact, headache, visual disturbance, dizzy/lightheadedness, syncope, chest pain, palpitations, SOB, ABD pain, N/V/D, current numbness/tingling/weakness, lower extremity pain/swelling, or other symptoms      /61   Pulse 55   Temp 98 2 °F (36 8 °C) (Temporal)   Resp 17   Wt 99 8 kg (220 lb)   SpO2 96%   BMI 27 50 kg/m²     · Unable to manipulate spoon earlier 2/2 right upper extremity weakness and tingling  · Weakness and majority of tingling has subsided and patient currently reports that his right 3rd and 4th finger "feel different than the other hand"  · CTA head/neck showing severe plaque stenosis at the origin of the right vertebral artery and a 2 8 x 1 2 cm soft tissue nodule in the posterior subcutaneous region at the level of C2 (nodule unable to be appreciated on palpation), without other abnormality  · Physical exam without focal neurologic deficit at this time  · EKG sinus bradycardia HR 56 w/anterior T-wave abnormality  · HS troponin 0H:5  · Afebrile; no leuokocytosis; no acute electrolyte abnormalities; not hypoglycemic  · Echo and MRI ordered  · Will follow stroke protocol overnight  · Continue home  mg daily and atorvastatin per stroke protocol, permissive hypertension and neuro checks per stroke protocol  · Neurology consulted      CAD (coronary artery disease)  Assessment & Plan  · S/p PTCA in 2003  · Denies chest pain  · Troponin 5  · EKG sinus bradycardia HR 56 with anterior T-wave abnormality  · Continue home ASA 325mg daily and statin  · Holding home metoprolol in setting of permissive hypertension per stroke protocol    Hypertension  Assessment & Plan  · /93 HR 64  · Hold home HTN medications in the setting of permissive hypertension per stroke protocol  · Monitor BP per stroke protocol    Diabetes mellitus, type 2 (HCC)  Assessment & Plan    Lab Results   Component Value Date    HGBA1C 7 0 (H) 12/20/2021     · Blood glucose 101  · Ordering half dose of home detemir (equivalent) as patients latest blood glucose was euglycemic, and will continuenovolog 5U TID w/ meals  · Correctional SSI  · Hypoglycemia protocol    Hyperlipidemia  Assessment & Plan  · Atorvastatin ordered per stroke protocol    VTE Pharmacologic Prophylaxis: VTE Score: 3 Moderate Risk (Score 3-4) - Pharmacological DVT Prophylaxis Ordered: enoxaparin (Lovenox)  Code Status: Level 1 - Full Code Level 1 Full Code   Discussion with family: update in AM      Anticipated Length of Stay: Patient will be admitted on an observation basis with an anticipated length of stay of less than 2 midnights secondary to Stroke-like symptoms  Total Time for Visit, including Counseling / Coordination of Care: 45 minutes Greater than 50% of this total time spent on direct patient counseling and coordination of care  Chief Complaint:  Right arm weakness and tingling    History of Present Illness:  Gerry Ohara is a 70 y o  male with a PMH of CAD s/p PTCA, DM, HTN, and HLP who presents with right arm weakness and tingling  Patient reports reaching for a bowl this afternoon and was unable to lift it with his right hand 2/2 weakness and associated tingling  Symptoms have subsided at this time   Denies fever, chill, recent sick contact, headache, visual disturbance, dizzy/lightheadedness, syncope, chest pain, palpitations, SOB, ABD pain, N/V/D, current numbness/tingling/weakness, lower extremity pain/swelling, or other symptoms  All questions answered at the bedside the patient's satisfaction  Review of Systems:  Review of Systems   Constitutional: Negative for activity change, appetite change, chills, diaphoresis, fatigue and fever  HENT: Negative for congestion, ear pain, nosebleeds and trouble swallowing  Eyes: Negative for pain and visual disturbance  Respiratory: Negative for apnea, cough, chest tightness, shortness of breath and wheezing  Cardiovascular: Negative for chest pain, palpitations and leg swelling  Gastrointestinal: Negative for abdominal distention, abdominal pain, blood in stool, constipation, diarrhea, nausea and vomiting  Endocrine: Negative for cold intolerance, heat intolerance and polyuria  Genitourinary: Negative for difficulty urinating, dysuria, flank pain and hematuria  Musculoskeletal: Negative for arthralgias, neck pain and neck stiffness  Skin: Negative for color change, rash and wound  Neurological: Positive for weakness (Right arm; now subsided) and numbness (Right arm; now subsided)  Negative for dizziness, tremors, syncope, light-headedness and headaches  Hematological: Negative for adenopathy  All other systems reviewed and are negative        Past Medical and Surgical History:   Past Medical History:   Diagnosis Date    Basal cell carcinoma 11/30/2021    left posterior auticular of the ear     Colon polyp     Coronary artery disease     Diabetes mellitus ([address] )     Hyperlipidemia     Hypertension        Past Surgical History:   Procedure Laterality Date    CATARACT EXTRACTION      COLONOSCOPY      CORONARY ANGIOPLASTY WITH STENT PLACEMENT  2003    CYST REMOVAL  2016    ILIAC ARTERY STENT Bilateral 01/08/2016    MOHS SURGERY Left 01/11/2022    Left posterior auricular of the ear       Meds/Allergies:  Prior to Admission medications Medication Sig Start Date End Date Taking? Authorizing Provider   amLODIPine (NORVASC) 10 mg tablet Take 10 mg by mouth daily    Historical Provider, MD   aspirin 325 mg tablet Take 325 mg by mouth    Historical Provider, MD JUNG UF III MINI PEN NEEDLES 31G X 5 MM MISC  11/22/18   Historical Provider, MD   Continuous Blood Gluc  (FreeStyle Kathya 14 Day [place]) DIOGO Use 1 each every 14 (fourteen) days 8/13/20   Historical Provider, MD   fluticasone [facility]) 50 mcg/act nasal spray  10/17/20   Historical Provider, MD   FREESTYLE LITE test strip  12/3/18   Historical Provider, MD   glimepiride (AMARYL) 2 mg tablet Take 2 mg by mouth 3/12/18   Historical Provider, MD   insulin aspart (NovoLOG) 100 units/mL injection Inject 5 Units under the skin 3 (three) times a day with meals    Historical Provider, MD   insulin detemir (LEVEMIR FLEXTOUCH) 100 Units/mL injection pen INJECT 21 UNITS AT BEDTIME 9/11/18   Historical Provider, MD   Jardiance 10 MG TABS  11/16/21   Historical Provider, MD   loratadine (CLARITIN) 10 mg tablet loratadine 10 mg tablet    Historical Provider, MD   metFORMIN (GLUCOPHAGE-XR) 750 mg 24 hr tablet Take 750 mg by mouth daily with breakfast    Historical Provider, MD   metoprolol succinate (TOPROL-XL) 100 mg 24 hr tablet  11/1/18   Historical Provider, MD   pantoprazole (PROTONIX) 40 mg tablet Take 1 tablet (40 mg total) by mouth daily 11/12/20   Carmen Leal PA-C   ramipril (ALTACE) 10 MG capsule TAKE 1 CAPSULE TWICE A DAY 11/5/18   Historical Provider, MD   rosuvastatin (CRESTOR) 10 MG tablet Take by mouth    Historical Provider, MD   Silodosin 8 MG CAPS Take 8 mg by mouth daily 12/9/20 12/9/21  Historical Provider, MD     I have reviewed home medications with patient personally      Allergies: No Known Allergies    Social History:  Marital Status: /Civil Union   Occupation: N/A  Patient Pre-hospital Living Situation: Home  Patient Pre-hospital Level of Mobility: walks  Patient Pre-hospital Diet Restrictions: Diabetic diet   Substance Use History:   Social History     Substance and Sexual Activity   Alcohol Use Never     Social History     Tobacco Use   Smoking Status Former Smoker    Quit date: 2017    Years since quittin 0   Smokeless Tobacco Never Used     Social History     Substance and Sexual Activity   Drug Use Never       Family History:  Family History   Problem Relation Age of Onset    Diabetes Father     Diabetes Sister     Basal cell carcinoma Daughter        Physical Exam:     Vitals:   Blood Pressure: 134/61 (22)  Pulse: 55 (22)  Temperature: 98 2 °F (36 8 °C) (22)  Temp Source: Temporal (22)  Respirations: 17 (22)  Weight - Scale: 99 8 kg (220 lb) (22)  SpO2: 96 % (22)    Physical Exam  Vitals and nursing note reviewed  Constitutional:       General: He is not in acute distress  Appearance: Normal appearance  HENT:      Head: Normocephalic and atraumatic  Right Ear: External ear normal       Left Ear: External ear normal       Nose: Nose normal       Mouth/Throat:      Mouth: Mucous membranes are moist    Eyes:      Extraocular Movements: Extraocular movements intact  Pupils: Pupils are equal, round, and reactive to light  Cardiovascular:      Rate and Rhythm: Normal rate and regular rhythm  Pulses: Normal pulses  Heart sounds: Normal heart sounds  No murmur heard  Pulmonary:      Effort: Pulmonary effort is normal  No respiratory distress  Breath sounds: Normal breath sounds  No wheezing or rales  Chest:      Chest wall: No tenderness  Abdominal:      General: Bowel sounds are normal  There is no distension  Palpations: Abdomen is soft  There is no mass  Tenderness: There is no abdominal tenderness  There is no guarding  Musculoskeletal:         General: No swelling or tenderness        Cervical back: Normal range of motion and neck supple  No rigidity or tenderness  Right lower leg: No edema  Left lower leg: No edema  Comments: 5/5 strength in BL upper and lower extremities      Skin:     General: Skin is warm and dry  Capillary Refill: Capillary refill takes less than 2 seconds  Findings: No lesion or rash  Neurological:      General: No focal deficit present  Mental Status: He is alert and oriented to person, place, and time  Motor: No weakness  Psychiatric:         Mood and Affect: Mood normal           Additional Data:     Lab Results:  Results from last 7 days   Lab Units 01/22/22  1828   WBC Thousand/uL 9 13   HEMOGLOBIN g/dL 15 1   HEMATOCRIT % 44 3   PLATELETS Thousands/uL 254   NEUTROS PCT % 51   LYMPHS PCT % 37   MONOS PCT % 9   EOS PCT % 2     Results from last 7 days   Lab Units 01/22/22  1828   SODIUM mmol/L 139   POTASSIUM mmol/L 3 9   CHLORIDE mmol/L 106   CO2 mmol/L 24   BUN mg/dL 22   CREATININE mg/dL 0 96   ANION GAP mmol/L 9   CALCIUM mg/dL 9 3   ALBUMIN g/dL 3 8   TOTAL BILIRUBIN mg/dL 0 25   ALK PHOS U/L 81   ALT U/L 22   AST U/L 20   GLUCOSE RANDOM mg/dL 101                       Imaging: Reviewed radiology reports from this admission including: CTA head/neck  CTA head and neck with and without contrast   Final Result by Bo Skiff, MD (01/22 1935)         1  No evidence of acute internal hemorrhage  2  Severe plaque stenosis at the origin of the right vertebral artery  3   2 8 x 1 2 cm soft tissue nodule /region identified in the posterior subcutaneous region at the level of C2, correlate clinically  Workstation performed: [id number]         MRI Inpatient Order    (Results Pending)       EKG and Other Studies Reviewed on Admission:   · EKG: Sinus Bradycardia  HR 56     ** Please Note: This note has been constructed using a voice recognition system   **

## 2022-01-23 NOTE — ASSESSMENT & PLAN NOTE
· S/p PTCA in 2003  · Denies chest pain  · Troponin 5  · EKG sinus bradycardia HR 56 with anterior T-wave abnormality  · Continue home ASA 81 mg daily and statin for additional 20 days, then stop plavix    · Continue metoprolol, Lipitor

## 2022-01-23 NOTE — ASSESSMENT & PLAN NOTE
His original stent was put in in 2003  He reports he is compliant with his meds  He does report an occasional flutter however it is somewhat the below the cardiac region  No other indications of AFib

## 2022-01-23 NOTE — ASSESSMENT & PLAN NOTE
This patient is maintained on a low dose of Crestor  If his MRI does indicate that he has had an ischemic infarct he should be changed to a higher dose statin of Lipitor 40 mg

## 2022-01-23 NOTE — ASSESSMENT & PLAN NOTE
Patient reports trying to eat a bowl of ice cream this afternoon and was unable to lift or properly manipulate the spoon 2/2 right hand weakness and associated tingling  Weakness has subsided at this time and patient is currently only complaining of his right 3rd and 4th finger "feeling different than the other hand " Denies fever, chill, recent sick contact, headache, visual disturbance, dizzy/lightheadedness, syncope, chest pain, palpitations, SOB, ABD pain, N/V/D, current numbness/tingling/weakness, lower extremity pain/swelling, or other symptoms  /61   Pulse 55   Temp 98 2 °F (36 8 °C) (Temporal)   Resp 17   Wt 99 8 kg (220 lb)   SpO2 96%   BMI 27 50 kg/m²     · Unable to manipulate spoon earlier 2/2 right upper extremity weakness and tingling  · Weakness and majority of tingling has subsided and patient currently reports that his right 3rd and 4th finger "feel different than the other hand"  Currently right hand weakness has resolved and patient has back to baseline  · CTA head/neck showing severe plaque stenosis at the origin of the right vertebral artery and a 2 8 x 1 2 cm soft tissue nodule in the posterior subcutaneous region at the level of C2 (nodule unable to be appreciated on palpation), without other abnormality  · Physical exam without focal neurologic deficit at this time  · EKG sinus bradycardia HR 56 w/anterior T-wave abnormality  · HS troponin 0H:5  · Afebrile; no leuokocytosis; no acute electrolyte abnormalities; not hypoglycemic  · MRI brain revealed Several tiny acute/early subacute lacunar infarcts in the left frontal lobe  · TTE and KANCHAN revealed no blots seen  · Patient complained of new onset headache this morning  Repeat CT scan of head revealed no intracranial hemorrhage  · Neurology recommended switch Crestor to Lipitor, Aspirin and plavix for 21 days then continue Aspirin and discontinue plavix   Will have event chuy record as outpatient  · Outpatient follow up with neurology

## 2022-01-23 NOTE — ASSESSMENT & PLAN NOTE
· /93 HR 64  · Hold home HTN medications in the setting of permissive hypertension per stroke protocol  · Monitor BP per stroke protocol

## 2022-01-23 NOTE — PROGRESS NOTES
INTERNAL MEDICINE RESIDENCY PROGRESS NOTE     Name: Devyn Or   Age & Sex: 70 y o  male   MRN: [id number]  Unit/Bed#: FT 02   Encounter: [id number]  Team:SLIM    PATIENT INFORMATION     Name: Devyn Or   Age & Sex: 70 y o  male   MRN: [id number]  Hospital Stay Days: 0    ASSESSMENT/PLAN     Principal Problem:    Stroke-like symptoms, R hnd weakness  Active Problems:    CAD (coronary artery disease)    Diabetes mellitus, type 2 (HCC)    Hyperlipidemia    Hypertension      * Stroke-like symptoms, R hnd weakness  Assessment & Plan  Patient reports trying to eat a bowl of ice cream this afternoon and was unable to lift or properly manipulate the spoon 2/2 right hand weakness and associated tingling  Weakness has subsided at this time and patient is currently only complaining of his right 3rd and 4th finger "feeling different than the other hand " Denies fever, chill, recent sick contact, headache, visual disturbance, dizzy/lightheadedness, syncope, chest pain, palpitations, SOB, ABD pain, N/V/D, current numbness/tingling/weakness, lower extremity pain/swelling, or other symptoms      /61   Pulse 55   Temp 98 2 °F (36 8 °C) (Temporal)   Resp 17   Wt 99 8 kg (220 lb)   SpO2 96%   BMI 27 50 kg/m²     · Unable to manipulate spoon earlier 2/2 right upper extremity weakness and tingling  · Weakness and majority of tingling has subsided and patient currently reports that his right 3rd and 4th finger "feel different than the other hand"  · CTA head/neck showing severe plaque stenosis at the origin of the right vertebral artery and a 2 8 x 1 2 cm soft tissue nodule in the posterior subcutaneous region at the level of C2 (nodule unable to be appreciated on palpation), without other abnormality  · Physical exam without focal neurologic deficit at this time  · EKG sinus bradycardia HR 56 w/anterior T-wave abnormality  · HS troponin 0H:5  · Afebrile; no leuokocytosis; no acute electrolyte abnormalities; not hypoglycemic  · Echo and MRI ordered  Follow results  · Will follow stroke protocol overnight  · Continue home  mg daily and atorvastatin per stroke protocol, permissive hypertension and neuro checks per stroke protocol  · Neurology consult-pending input appreciated  Hypertension  Assessment & Plan  · /93 HR 64  · Hold home HTN medications in the setting of permissive hypertension per stroke protocol  · Monitor BP per stroke protocol    Hyperlipidemia  Assessment & Plan  · Atorvastatin ordered per stroke protocol    Diabetes mellitus, type 2 (HCC)  Assessment & Plan    Lab Results   Component Value Date    HGBA1C 7 0 (H) 2021     · Blood glucose 101  · Ordering half dose of home detemir (equivalent) as patients latest blood glucose was euglycemic, and will continuenovolog 5U TID w/ meals  · Correctional SSI  · Hypoglycemia protocol    CAD (coronary artery disease)  Assessment & Plan  · S/p PTCA in   · Denies chest pain  · Troponin 5  · EKG sinus bradycardia HR 56 with anterior T-wave abnormality  · Continue home ASA 325mg daily and statin  · Holding home metoprolol in setting of permissive hypertension per stroke protocol      Disposition: continue IP     SUBJECTIVE     Patient seen and examined  No acute events overnight  Right upper extremity weakness completely resolved  Denied any numbness or tingling  Patient denied any headache, dizziness, lightheadedness , double vision, nausea, vomiting, chest pain, palpitation, skipped beats, abdominal pain, urinary symptoms, changes in bowel movement    OBJECTIVE     Vitals:    22 2100 22 2200 22 0418 22 0900   BP: 147/68 134/61 123/58 124/81   BP Location:   Left arm Left arm   Pulse: 57 55 58 81   Resp:   18 18   Temp:       TempSrc:       SpO2: 98% 96% 97% 96%   Weight:          Temperature:   Temp (24hrs), Av 2 °F (36 8 °C), Min:98 2 °F (36 8 °C), Max:98 2 °F (36 8 °C)    Temperature: 98 2 °F (36 8 °C)  Intake & Output:  I/O       01/21 0701  01/22 0700 01/22 0701  01/23 0700 01/23 0701  01/24 0700    Urine (mL/kg/hr)  500     Total Output  500     Net  -500                Weights:        Body mass index is 27 5 kg/m²  Weight (last 2 days)     Date/Time Weight    01/22/22 1732 99 8 (220)        Physical Exam  Vitals and nursing note reviewed  Constitutional:       Appearance: He is well-developed  HENT:      Head: Normocephalic and atraumatic  Nose: Nose normal       Mouth/Throat:      Mouth: Mucous membranes are moist    Eyes:      Extraocular Movements: Extraocular movements intact  Conjunctiva/sclera: Conjunctivae normal    Cardiovascular:      Rate and Rhythm: Normal rate and regular rhythm  Heart sounds: No murmur heard  Pulmonary:      Effort: Pulmonary effort is normal  No respiratory distress  Breath sounds: Normal breath sounds  No wheezing, rhonchi or rales  Abdominal:      General: Abdomen is flat  Bowel sounds are normal       Palpations: Abdomen is soft  Tenderness: There is no abdominal tenderness  Musculoskeletal:         General: Normal range of motion  Cervical back: Normal range of motion and neck supple  Right lower leg: No edema  Left lower leg: No edema  Skin:     General: Skin is warm and dry  Neurological:      General: No focal deficit present  Mental Status: He is alert and oriented to person, place, and time  Mental status is at baseline  Sensory: No sensory deficit  Motor: No weakness  Coordination: Coordination normal       Gait: Gait normal    Psychiatric:         Mood and Affect: Mood normal          Behavior: Behavior normal        LABORATORY DATA     Labs: I have personally reviewed pertinent reports    Results from last 7 days   Lab Units 01/22/22  1828   WBC Thousand/uL 9 13   HEMOGLOBIN g/dL 15 1   HEMATOCRIT % 44 3   PLATELETS Thousands/uL 254   NEUTROS PCT % 51   MONOS PCT % 9      Results from last 7 days   Lab Units 01/22/22  1828   POTASSIUM mmol/L 3 9   CHLORIDE mmol/L 106   CO2 mmol/L 24   BUN mg/dL 22   CREATININE mg/dL 0 96   CALCIUM mg/dL 9 3   ALK PHOS U/L 81   ALT U/L 22   AST U/L 20                            IMAGING & DIAGNOSTIC TESTING     Radiology Results: I have personally reviewed pertinent reports  CTA head and neck with and without contrast    Result Date: 1/22/2022  Impression: 1  No evidence of acute internal hemorrhage  2  Severe plaque stenosis at the origin of the right vertebral artery  3   2 8 x 1 2 cm soft tissue nodule /region identified in the posterior subcutaneous region at the level of C2, correlate clinically  Workstation performed: [id number]     Other Diagnostic Testing: I have personally reviewed pertinent reports      ACTIVE MEDICATIONS     Current Facility-Administered Medications   Medication Dose Route Frequency    acetaminophen (TYLENOL) tablet 650 mg  650 mg Oral Q6H PRN    aspirin tablet 325 mg  325 mg Oral Daily    atorvastatin (LIPITOR) tablet 40 mg  40 mg Oral QPM    enoxaparin (LOVENOX) subcutaneous injection 40 mg  40 mg Subcutaneous Daily    fluticasone (FLONASE) 50 mcg/act nasal spray 2 spray  2 spray Each Nare Daily    insulin detemir (LEVEMIR) subcutaneous injection 10 Units  10 Units Subcutaneous HS    insulin lispro (HumaLOG) 100 units/mL subcutaneous injection 1-5 Units  1-5 Units Subcutaneous HS    insulin lispro (HumaLOG) 100 units/mL subcutaneous injection 1-6 Units  1-6 Units Subcutaneous TID AC    insulin lispro (HumaLOG) 100 units/mL subcutaneous injection 5 Units  5 Units Subcutaneous TID With Meals    loratadine (CLARITIN) tablet 10 mg  10 mg Oral Daily    pantoprazole (PROTONIX) EC tablet 40 mg  40 mg Oral Daily    tamsulosin (FLOMAX) capsule 0 8 mg  0 8 mg Oral Daily With Dinner       VTE Pharmacologic Prophylaxis: Enoxaparin (Lovenox)  VTE Mechanical Prophylaxis: sequential compression device    Portions of the record may have been created with voice recognition software  Occasional wrong word or "sound a like" substitutions may have occurred due to the inherent limitations of voice recognition software    Read the chart carefully and recognize, using context, where substitutions have occurred   ==  Ervin Walter MD  [address]  Internal Medicine Residency PGY-3

## 2022-01-24 ENCOUNTER — ANESTHESIA (INPATIENT)
Dept: NON INVASIVE DIAGNOSTICS | Facility: HOSPITAL | Age: 72
DRG: 066 | End: 2022-01-24
Payer: MEDICARE

## 2022-01-24 ENCOUNTER — TELEPHONE (OUTPATIENT)
Dept: SURGERY | Facility: HOSPITAL | Age: 72
End: 2022-01-24

## 2022-01-24 ENCOUNTER — EPISODE CHANGES (OUTPATIENT)
Dept: CASE MANAGEMENT | Facility: OTHER | Age: 72
End: 2022-01-24

## 2022-01-24 ENCOUNTER — APPOINTMENT (INPATIENT)
Dept: CT IMAGING | Facility: HOSPITAL | Age: 72
DRG: 066 | End: 2022-01-24
Payer: MEDICARE

## 2022-01-24 VITALS
WEIGHT: 214 LBS | SYSTOLIC BLOOD PRESSURE: 139 MMHG | HEART RATE: 66 BPM | DIASTOLIC BLOOD PRESSURE: 64 MMHG | BODY MASS INDEX: 26.61 KG/M2 | OXYGEN SATURATION: 96 % | TEMPERATURE: 97.4 F | HEIGHT: 75 IN | RESPIRATION RATE: 18 BRPM

## 2022-01-24 PROBLEM — I63.9 CEREBROVASCULAR ACCIDENT (CVA) DUE TO EMBOLISM (HCC): Status: ACTIVE | Noted: 2022-01-22

## 2022-01-24 LAB
ANION GAP SERPL CALCULATED.3IONS-SCNC: 10 MMOL/L (ref 4–13)
AORTIC ROOT: 3.7 CM
APICAL FOUR CHAMBER EJECTION FRACTION: 62 %
ASCENDING AORTA: 3.3 CM
BASOPHILS # BLD AUTO: 0.06 THOUSANDS/ΜL (ref 0–0.1)
BASOPHILS NFR BLD AUTO: 1 % (ref 0–1)
BUN SERPL-MCNC: 25 MG/DL (ref 5–25)
CALCIUM SERPL-MCNC: 9.5 MG/DL (ref 8.3–10.1)
CHLORIDE SERPL-SCNC: 104 MMOL/L (ref 100–108)
CO2 SERPL-SCNC: 24 MMOL/L (ref 21–32)
CREAT SERPL-MCNC: 0.89 MG/DL (ref 0.6–1.3)
E WAVE DECELERATION TIME: 289 MS
EOSINOPHIL # BLD AUTO: 0.11 THOUSAND/ΜL (ref 0–0.61)
EOSINOPHIL NFR BLD AUTO: 2 % (ref 0–6)
ERYTHROCYTE [DISTWIDTH] IN BLOOD BY AUTOMATED COUNT: 12.6 % (ref 11.6–15.1)
ERYTHROCYTE [SEDIMENTATION RATE] IN BLOOD: 15 MM/HOUR (ref 0–19)
FRACTIONAL SHORTENING: 43 % (ref 28–44)
GFR SERPL CREATININE-BSD FRML MDRD: 86 ML/MIN/1.73SQ M
GLUCOSE SERPL-MCNC: 103 MG/DL (ref 65–140)
GLUCOSE SERPL-MCNC: 116 MG/DL (ref 65–140)
GLUCOSE SERPL-MCNC: 124 MG/DL (ref 65–140)
GLUCOSE SERPL-MCNC: 139 MG/DL (ref 65–140)
HCT VFR BLD AUTO: 46.8 % (ref 36.5–49.3)
HGB BLD-MCNC: 15.9 G/DL (ref 12–17)
IMM GRANULOCYTES # BLD AUTO: 0.01 THOUSAND/UL (ref 0–0.2)
IMM GRANULOCYTES NFR BLD AUTO: 0 % (ref 0–2)
INTERVENTRICULAR SEPTUM IN DIASTOLE (PARASTERNAL SHORT AXIS VIEW): 1.1 CM
LEFT ATRIUM AREA SYSTOLE SINGLE PLANE A4C: 21.8 CM2
LEFT ATRIUM SIZE: 2.7 CM
LEFT INTERNAL DIMENSION IN SYSTOLE: 3.1 CM (ref 2.1–4)
LEFT VENTRICULAR INTERNAL DIMENSION IN DIASTOLE: 5.4 CM (ref 7.44–11.09)
LEFT VENTRICULAR POSTERIOR WALL IN END DIASTOLE: 1.1 CM
LEFT VENTRICULAR STROKE VOLUME: 103 ML
LYMPHOCYTES # BLD AUTO: 2.04 THOUSANDS/ΜL (ref 0.6–4.47)
LYMPHOCYTES NFR BLD AUTO: 27 % (ref 14–44)
MCH RBC QN AUTO: 32.3 PG (ref 26.8–34.3)
MCHC RBC AUTO-ENTMCNC: 34 G/DL (ref 31.4–37.4)
MCV RBC AUTO: 95 FL (ref 82–98)
MONOCYTES # BLD AUTO: 0.64 THOUSAND/ΜL (ref 0.17–1.22)
MONOCYTES NFR BLD AUTO: 9 % (ref 4–12)
MV E'TISSUE VEL-SEP: 8 CM/S
MV PEAK A VEL: 1.04 M/S
MV PEAK E VEL: 70 CM/S
MV STENOSIS PRESSURE HALF TIME: 0 MS
NEUTROPHILS # BLD AUTO: 4.66 THOUSANDS/ΜL (ref 1.85–7.62)
NEUTS SEG NFR BLD AUTO: 61 % (ref 43–75)
NRBC BLD AUTO-RTO: 0 /100 WBCS
PLATELET # BLD AUTO: 240 THOUSANDS/UL (ref 149–390)
PMV BLD AUTO: 9.5 FL (ref 8.9–12.7)
POTASSIUM SERPL-SCNC: 4 MMOL/L (ref 3.5–5.3)
RBC # BLD AUTO: 4.93 MILLION/UL (ref 3.88–5.62)
RIGHT ATRIUM AREA SYSTOLE A4C: 17.8 CM2
RIGHT VENTRICLE ID DIMENSION: 3.7 CM
SL CV LV EF: 60
SL CV LV EF: 60
SL CV PED ECHO LEFT VENTRICLE DIASTOLIC VOLUME (MOD BIPLANE) 2D: 141 ML
SL CV PED ECHO LEFT VENTRICLE SYSTOLIC VOLUME (MOD BIPLANE) 2D: 38 ML
SODIUM SERPL-SCNC: 138 MMOL/L (ref 136–145)
WBC # BLD AUTO: 7.52 THOUSAND/UL (ref 4.31–10.16)
Z-SCORE OF LEFT VENTRICULAR DIMENSION IN END SYSTOLE: -5.2

## 2022-01-24 PROCEDURE — 70450 CT HEAD/BRAIN W/O DYE: CPT

## 2022-01-24 PROCEDURE — 99233 SBSQ HOSP IP/OBS HIGH 50: CPT | Performed by: PSYCHIATRY & NEUROLOGY

## 2022-01-24 PROCEDURE — 82948 REAGENT STRIP/BLOOD GLUCOSE: CPT

## 2022-01-24 PROCEDURE — 93312 ECHO TRANSESOPHAGEAL: CPT

## 2022-01-24 PROCEDURE — 93320 DOPPLER ECHO COMPLETE: CPT | Performed by: INTERNAL MEDICINE

## 2022-01-24 PROCEDURE — 93325 DOPPLER ECHO COLOR FLOW MAPG: CPT | Performed by: INTERNAL MEDICINE

## 2022-01-24 PROCEDURE — 80048 BASIC METABOLIC PNL TOTAL CA: CPT | Performed by: INTERNAL MEDICINE

## 2022-01-24 PROCEDURE — 99223 1ST HOSP IP/OBS HIGH 75: CPT | Performed by: INTERNAL MEDICINE

## 2022-01-24 PROCEDURE — 85652 RBC SED RATE AUTOMATED: CPT | Performed by: PHYSICIAN ASSISTANT

## 2022-01-24 PROCEDURE — B24BZZ4 ULTRASONOGRAPHY OF HEART WITH AORTA, TRANSESOPHAGEAL: ICD-10-PCS | Performed by: INTERNAL MEDICINE

## 2022-01-24 PROCEDURE — 99239 HOSP IP/OBS DSCHRG MGMT >30: CPT | Performed by: INTERNAL MEDICINE

## 2022-01-24 PROCEDURE — 93312 ECHO TRANSESOPHAGEAL: CPT | Performed by: INTERNAL MEDICINE

## 2022-01-24 PROCEDURE — G1004 CDSM NDSC: HCPCS

## 2022-01-24 PROCEDURE — 85025 COMPLETE CBC W/AUTO DIFF WBC: CPT | Performed by: INTERNAL MEDICINE

## 2022-01-24 RX ORDER — ATORVASTATIN CALCIUM 40 MG/1
40 TABLET, FILM COATED ORAL EVERY EVENING
Qty: 30 TABLET | Refills: 0 | Status: SHIPPED | OUTPATIENT
Start: 2022-01-24 | End: 2022-01-24

## 2022-01-24 RX ORDER — SODIUM CHLORIDE, SODIUM LACTATE, POTASSIUM CHLORIDE, CALCIUM CHLORIDE 600; 310; 30; 20 MG/100ML; MG/100ML; MG/100ML; MG/100ML
INJECTION, SOLUTION INTRAVENOUS CONTINUOUS PRN
Status: DISCONTINUED | OUTPATIENT
Start: 2022-01-24 | End: 2022-01-24

## 2022-01-24 RX ORDER — ASPIRIN 81 MG/1
81 TABLET ORAL DAILY
Status: DISCONTINUED | OUTPATIENT
Start: 2022-01-24 | End: 2022-01-24 | Stop reason: HOSPADM

## 2022-01-24 RX ORDER — ASPIRIN 81 MG/1
81 TABLET ORAL DAILY
Qty: 30 TABLET | Refills: 0 | Status: SHIPPED | OUTPATIENT
Start: 2022-01-24 | End: 2022-02-23

## 2022-01-24 RX ORDER — METOPROLOL SUCCINATE 25 MG/1
25 TABLET, EXTENDED RELEASE ORAL DAILY
Qty: 30 TABLET | Refills: 0 | Status: SHIPPED | OUTPATIENT
Start: 2022-01-24 | End: 2022-03-11

## 2022-01-24 RX ORDER — FENTANYL CITRATE 50 UG/ML
INJECTION, SOLUTION INTRAMUSCULAR; INTRAVENOUS AS NEEDED
Status: DISCONTINUED | OUTPATIENT
Start: 2022-01-24 | End: 2022-01-24

## 2022-01-24 RX ORDER — PROPOFOL 10 MG/ML
INJECTION, EMULSION INTRAVENOUS AS NEEDED
Status: DISCONTINUED | OUTPATIENT
Start: 2022-01-24 | End: 2022-01-24

## 2022-01-24 RX ORDER — CLOPIDOGREL BISULFATE 75 MG/1
75 TABLET ORAL DAILY
Qty: 20 TABLET | Refills: 0 | Status: SHIPPED | OUTPATIENT
Start: 2022-01-24 | End: 2022-02-13

## 2022-01-24 RX ORDER — PROPOFOL 10 MG/ML
INJECTION, EMULSION INTRAVENOUS CONTINUOUS PRN
Status: DISCONTINUED | OUTPATIENT
Start: 2022-01-24 | End: 2022-01-24

## 2022-01-24 RX ORDER — LIDOCAINE HYDROCHLORIDE 20 MG/ML
INJECTION, SOLUTION EPIDURAL; INFILTRATION; INTRACAUDAL; PERINEURAL AS NEEDED
Status: DISCONTINUED | OUTPATIENT
Start: 2022-01-24 | End: 2022-01-24

## 2022-01-24 RX ORDER — ATORVASTATIN CALCIUM 40 MG/1
40 TABLET, FILM COATED ORAL EVERY EVENING
Qty: 30 TABLET | Refills: 0 | Status: SHIPPED | OUTPATIENT
Start: 2022-01-24 | End: 2022-01-27

## 2022-01-24 RX ORDER — METOPROLOL SUCCINATE 25 MG/1
25 TABLET, EXTENDED RELEASE ORAL DAILY
Status: DISCONTINUED | OUTPATIENT
Start: 2022-01-24 | End: 2022-01-24 | Stop reason: HOSPADM

## 2022-01-24 RX ADMIN — PROPOFOL 20 MG: 10 INJECTION, EMULSION INTRAVENOUS at 09:44

## 2022-01-24 RX ADMIN — ENOXAPARIN SODIUM 40 MG: 40 INJECTION SUBCUTANEOUS at 11:29

## 2022-01-24 RX ADMIN — PANTOPRAZOLE SODIUM 40 MG: 40 TABLET, DELAYED RELEASE ORAL at 11:29

## 2022-01-24 RX ADMIN — PROPOFOL 30 MG: 10 INJECTION, EMULSION INTRAVENOUS at 09:41

## 2022-01-24 RX ADMIN — INSULIN LISPRO 5 UNITS: 100 INJECTION, SOLUTION INTRAVENOUS; SUBCUTANEOUS at 13:18

## 2022-01-24 RX ADMIN — PROPOFOL 100 MG: 10 INJECTION, EMULSION INTRAVENOUS at 09:40

## 2022-01-24 RX ADMIN — FENTANYL CITRATE 25 MCG: 50 INJECTION, SOLUTION INTRAMUSCULAR; INTRAVENOUS at 09:49

## 2022-01-24 RX ADMIN — TAMSULOSIN HYDROCHLORIDE 0.8 MG: 0.4 CAPSULE ORAL at 16:50

## 2022-01-24 RX ADMIN — CLOPIDOGREL BISULFATE 75 MG: 75 TABLET ORAL at 11:29

## 2022-01-24 RX ADMIN — PROPOFOL 120 MCG/KG/MIN: 10 INJECTION, EMULSION INTRAVENOUS at 09:40

## 2022-01-24 RX ADMIN — FENTANYL CITRATE 25 MCG: 50 INJECTION, SOLUTION INTRAMUSCULAR; INTRAVENOUS at 09:40

## 2022-01-24 RX ADMIN — METOPROLOL SUCCINATE 25 MG: 25 TABLET, EXTENDED RELEASE ORAL at 11:29

## 2022-01-24 RX ADMIN — LORATADINE 10 MG: 10 TABLET ORAL at 11:29

## 2022-01-24 RX ADMIN — ASPIRIN 81 MG: 81 TABLET, COATED ORAL at 11:29

## 2022-01-24 RX ADMIN — ATORVASTATIN CALCIUM 40 MG: 40 TABLET, FILM COATED ORAL at 16:50

## 2022-01-24 RX ADMIN — SODIUM CHLORIDE, SODIUM LACTATE, POTASSIUM CHLORIDE, AND CALCIUM CHLORIDE: .6; .31; .03; .02 INJECTION, SOLUTION INTRAVENOUS at 09:35

## 2022-01-24 RX ADMIN — LIDOCAINE HYDROCHLORIDE 100 MG: 20 INJECTION, SOLUTION EPIDURAL; INFILTRATION; INTRACAUDAL; PERINEURAL at 09:40

## 2022-01-24 NOTE — PLAN OF CARE
Problem: Potential for Falls  Goal: Patient will remain free of falls  Description: INTERVENTIONS:  - Educate patient/family on patient safety including physical limitations  - Instruct patient to call for assistance with activity   - Consult OT/PT to assist with strengthening/mobility   - Keep Call bell within reach  - Keep bed low and locked with side rails adjusted as appropriate  - Keep care items and personal belongings within reach  - Initiate and maintain comfort rounds  - Apply yellow socks and bracelet for high fall risk patients  - Consider moving patient to room near nurses station  Outcome: Progressing     Problem: Neurological Deficit  Goal: Neurological status is stable or improving  Description: Interventions:  - Monitor and assess patient's level of consciousness, motor function, sensory function, and level of assistance needed for ADLs  - Monitor and report changes from baseline  Collaborate with interdisciplinary team to initiate plan and implement interventions as ordered  - Provide and maintain a safe environment  - Consider seizure precautions  - Consider fall precautions  - Consider aspiration precautions  - Consider bleeding precautions  Outcome: Progressing     Problem: Activity Intolerance/Impaired Mobility  Goal: Mobility/activity is maintained at optimum level for patient  Description: Interventions:  - Assess and monitor patient  barriers to mobility and need for assistive/adaptive devices  - Assess patient's emotional response to limitations  - Collaborate with interdisciplinary team and initiate plans and interventions as ordered  - Encourage independent activity per ability   - Maintain proper body alignment  - Perform active/passive rom as tolerated/ordered    - Plan activities to conserve energy   - Turn patient as appropriate  Outcome: Progressing  Problem: Communication Impairment  Goal: Ability to express needs and understand communication  Description: Assess patient's communication skills and ability to understand information  Patient will demonstrate use of effective communication techniques, alternative methods of communication and understanding even if not able to speak  - Encourage communication and provide alternate methods of communication as needed  - Collaborate with case management/ for discharge needs  - Include patient/family/caregiver in decisions related to communication  Outcome: Progressing     Problem: Potential for Aspiration  Goal: Non-ventilated patient's risk of aspiration is minimized  Description: Assess and monitor vital signs, respiratory status, and labs (WBC)  Monitor for signs of aspiration (tachypnea, cough, rales, wheezing, cyanosis, fever)  - Assess and monitor patient's ability to swallow  - Place patient up in chair to eat if possible  - HOB up at 90 degrees to eat if unable to get patient up into chair   - Supervise patient during oral intake  - Instruct patient/ family to take small bites  - Instruct patient/ family to take small single sips when taking liquids  - Follow patient-specific strategies generated by speech pathologist   Outcome: Progressing  Goal: Ventilated patient's risk of aspiration is minimized  Description: Assess and monitor vital signs, respiratory status, airway cuff pressure, and labs (WBC)  Monitor for signs of aspiration (tachypnea, cough, rales, wheezing, cyanosis, fever)  - Elevate head of bed 30 degrees if patient has tube feeding   - Monitor tube feeding  Outcome: Progressing     Problem: Nutrition  Goal: Nutrition/Hydration status is improving  Description: Monitor and assess patient's nutrition/hydration status for malnutrition (ex- brittle hair, bruises, dry skin, pale skin and conjunctiva, muscle wasting, smooth red tongue, and disorientation)  Collaborate with interdisciplinary team and initiate plan and interventions as ordered    Monitor patient's weight and dietary intake as ordered or per policy  Utilize nutrition screening tool and intervene per policy  Determine patient's food preferences and provide high-protein, high-caloric foods as appropriate  - Assist patient with eating   - Allow adequate time for meals   - Encourage patient to take dietary supplement as ordered  - Collaborate with clinical nutritionist   - Include patient/family/caregiver in decisions related to nutrition    Outcome: Progressing

## 2022-01-24 NOTE — DISCHARGE INSTR - AVS FIRST PAGE
Please outpatient follow-up with PCP in a week  Please outpatient follow-up with Neurology  Please outpatient follow-up up with Cardiology with event chuy  Please take aspirin and Plavix for 20 days, then stop Plavix and take aspirin 325 mg oral Daily

## 2022-01-24 NOTE — CONSULTS
Consultation - Cardiology   Juan Tolbert 70 y o  male MRN: [id number]  Unit/Bed#: -01 Encounter: [id number]  01/24/22  9:28 AM    Assessment/ Plan:  1  Left frontal lobe lacunar infarct, acute/subacute  Embolic appearing  Neurology has requested OMID to rule out cardioembolic source  Reviewed risk and benefits, patient quoted a risk of 1% for anesthesia complications, esophageal perforation  Given the risk patient wishes to proceed  Plan for this today  Further recommendations based upon results of OMID  Continue telemetry  Continue aspirin, Lipitor, Plavix, restart low-dose Toprol at 25 mg daily  Transthoracic echo showed EF at 25%, grade 1 diastolic dysfunction  2  CAD with history of stent in 2003, followed by [facility]  Continue aspirin, Lipitor, Plavix, Toprol    3  Hypertension, labile  Last blood pressure 162/73  Patient has been off his Toprol since admission  Restarted at 25 daily  Continue to monitor  4  Hyperlipidemia on Lipitor    5  Diabetes, management per [facility] Hospitalist    6  Peripheral arterial disease with history of iliac stent, continue aspirin, Lipitor, Plavix  History of Present Illness   Physician Requesting Consult: Marianne Munguia MD    Reason for Consult / Principal Problem: omid, stroke    HPI: Juan Tolbert is a 70y o  year old male who presents with right arm weakness and tingling  Patient states he was reaching for bowl or the afternoon and he was unable to lift it up with his right hand  Symptoms subsided by the time he was at the emergency room  Denies fever, chills, recent sick contacts, headache, visual disturbance, dizziness, syncope, chest pains, palpitations, shortness of breath  Workup done in the emergency room MRI demonstrated several tiny acute/subacute lacunar infarcts in the left frontal lobe, per Neurology there embolic appearing  Started on aspirin, Plavix    OMID was requested to rule out cardioembolic source  Currently patient has no complaints  Past medical history:  CAD with stent in , P 80 with iliac stent in 2016, hypertension, diabetes, hyperlipidemia    Consults    EKG:  Sinus bradycardia, first-degree AV block, incomplete right bundle-branch block, septal infarct, age undetermined      Review of Systems   Constitutional: Negative  Respiratory: Negative  Cardiovascular: Negative  Neurological: Positive for weakness and numbness  + right arm weakness/numbness, loss of strength   Hematological: Negative  Psychiatric/Behavioral: Negative  All other systems reviewed and are negative        Historical Information   Past Medical History:   Diagnosis Date    Basal cell carcinoma 2021    left posterior auticular of the ear     Colon polyp     Coronary artery disease     Diabetes mellitus ([address] )     Hyperlipidemia     Hypertension      Past Surgical History:   Procedure Laterality Date    CATARACT EXTRACTION      COLONOSCOPY      CORONARY ANGIOPLASTY WITH STENT PLACEMENT      CYST REMOVAL  2016    ILIAC ARTERY STENT Bilateral 2016    MOHS SURGERY Left 2022    Left posterior auricular of the ear     Social History     Substance and Sexual Activity   Alcohol Use Never     Social History     Substance and Sexual Activity   Drug Use Never     Social History     Tobacco Use   Smoking Status Former Smoker    Quit date:     Years since quittin 0   Smokeless Tobacco Never Used       Family History:   Family History   Problem Relation Age of Onset    Diabetes Father     Diabetes Sister     Basal cell carcinoma Daughter        Meds/Allergies   all current active meds have been reviewed and current meds:   Current Facility-Administered Medications   Medication Dose Route Frequency    acetaminophen (TYLENOL) tablet 650 mg  650 mg Oral Q6H PRN    aspirin (ECOTRIN LOW STRENGTH) EC tablet 81 mg  81 mg Oral Daily    atorvastatin (LIPITOR) tablet 40 mg  40 mg Oral QPM    clopidogrel (PLAVIX) tablet 75 mg  75 mg Oral Daily    enoxaparin (LOVENOX) subcutaneous injection 40 mg  40 mg Subcutaneous Daily    fluticasone (FLONASE) 50 mcg/act nasal spray 2 spray  2 spray Each Nare Daily    insulin detemir (LEVEMIR) subcutaneous injection 10 Units  10 Units Subcutaneous HS    insulin lispro (HumaLOG) 100 units/mL subcutaneous injection 1-5 Units  1-5 Units Subcutaneous HS    insulin lispro (HumaLOG) 100 units/mL subcutaneous injection 1-6 Units  1-6 Units Subcutaneous TID AC    insulin lispro (HumaLOG) 100 units/mL subcutaneous injection 5 Units  5 Units Subcutaneous TID With Meals    loratadine (CLARITIN) tablet 10 mg  10 mg Oral Daily    pantoprazole (PROTONIX) EC tablet 40 mg  40 mg Oral Daily    tamsulosin (FLOMAX) capsule 0 8 mg  0 8 mg Oral Daily With Dinner     No Known Allergies    Objective   Vitals: Blood pressure 162/73, pulse 66, temperature (!) 97 1 °F (36 2 °C), temperature source Temporal, resp  rate 18, height 6' 3" (1 905 m), weight 97 1 kg (214 lb 1 1 oz), SpO2 97 %  , Body mass index is 26 76 kg/m² ,   Orthostatic Blood Pressures      Most Recent Value   Blood Pressure 162/73 filed at 01/24/2022 0901   Patient Position - Orthostatic VS Lying filed at 01/23/2022 8088          Systolic (89VNY), WAV:177 , Min:110 , ICP:411     Diastolic (60KNM), RNM:27, Min:57, Max:78      No intake or output data in the 24 hours ending 01/24/22 0928    Invasive Devices  Report    Peripheral Intravenous Line            Peripheral IV 01/22/22 Right Antecubital 1 day                    Physical Exam:  GEN: Alert and oriented x 3, in no acute distress  Well appearing and well nourished  HEENT: Sclera anicteric, conjunctivae pink, mucous membranes moist  Oropharynx clear  NECK: Supple, no carotid bruits, no significant JVD  Trachea midline, no thyromegaly  HEART: Regular rhythm, normal S1 and S2, no murmurs, clicks, gallops or rubs   PMI nondisplaced, no thrills  LUNGS: Clear to auscultation bilaterally; no wheezes, rales, or rhonchi  No increased work of breathing or signs of respiratory distress  ABDOMEN: Soft, nontender, nondistended, normoactive bowel sounds  EXTREMITIES: Skin warm and well perfused, no clubbing, cyanosis, or edema  NEURO: No focal findings  Normal speech  Mood and affect normal    SKIN: Normal without suspicious lesions on exposed skin        Lab Results:     Troponins:       CBC with diff:   Results from last 7 days   Lab Units 01/24/22  0507 01/22/22  1828   WBC Thousand/uL 7 52 9 13   HEMOGLOBIN g/dL 15 9 15 1   HEMATOCRIT % 46 8 44 3   MCV fL 95 95   PLATELETS Thousands/uL 240 254   MCH pg 32 3 32 3   MCHC g/dL 34 0 34 1   RDW % 12 6 12 8   MPV fL 9 5 9 6   NRBC AUTO /100 WBCs 0 0         CMP:   Results from last 7 days   Lab Units 01/24/22  0507 01/22/22  1828   POTASSIUM mmol/L 4 0 3 9   CHLORIDE mmol/L 104 106   CO2 mmol/L 24 24   BUN mg/dL 25 22   CREATININE mg/dL 0 89 0 96   CALCIUM mg/dL 9 5 9 3   AST U/L  --  20   ALT U/L  --  22   ALK PHOS U/L  --  81   EGFR ml/min/1 73sq m 86 79

## 2022-01-24 NOTE — DISCHARGE INSTRUCTIONS
Stroke   WHAT YOU NEED TO KNOW:   A stroke happens when blood flow to part of the brain is interrupted  This can cause serious brain damage from a lack of oxygen  Your healthcare providers will help you create goals for your recovery  They will help you and your family or care providers make a plan for care at home to help you reach your goals  The plan will include lifestyle changes you can make to help you manage your health and prevent another stroke  Your discharge instructions will include information on services and equipment you or your family may need  DISCHARGE INSTRUCTIONS:   Call your local emergency number (911 in the [address] [facility] [facility]) for any of the following:   · You have any of the following signs of a stroke:      ? Numbness or drooping on one side of your face     ? Weakness in an arm or leg    ? Confusion or difficulty speaking    ? Dizziness, a severe headache, or vision loss       · You have a seizure  · You have chest pain or shortness of breath  Seek care immediately if:   · Your arm or leg feels warm, tender, and painful  It may look swollen and red  · You have loss of balance or coordination  · You have double vision or vision loss  · You have unusual or heavy bleeding  Call your doctor or neurologist if:   · Your blood sugar level or blood pressure is higher or lower than usual     · You have trouble swallowing  · You have trouble having a bowel movement or urinating  · You have questions or concerns about your condition or care  Medicines:  Medicines may be needed to treat high cholesterol, high blood pressure, or diabetes  You may also need any of the following, depending on the kind of stroke you had:  · Antiplatelets , such as aspirin, help prevent blood clots  Take your antiplatelet medicine exactly as directed  These medicines make it more likely for you to bleed or bruise  If you are told to take aspirin, do not take acetaminophen or ibuprofen instead  · Blood thinners  help prevent blood clots  Clots can cause strokes, heart attacks, and death  The following are general safety guidelines to follow while you are taking a blood thinner:    ? Watch for bleeding and bruising while you take blood thinners  Watch for bleeding from your gums or nose  Watch for blood in your urine and bowel movements  Use a soft washcloth on your skin, and a soft toothbrush to brush your teeth  This can keep your skin and gums from bleeding  If you shave, use an electric shaver  Do not play contact sports  ? Tell your dentist and other healthcare providers that you take a blood thinner  Wear a bracelet or necklace that says you take this medicine  ? Do not start or stop any other medicines unless your healthcare provider tells you to  Many medicines cannot be used with blood thinners  ? Take your blood thinner exactly as prescribed by your healthcare provider  Do not skip does or take less than prescribed  Tell your provider right away if you forget to take your blood thinner, or if you take too much  ? Warfarin  is a blood thinner that you may need to take  The following are things you should be aware of if you take warfarin:     § Foods and medicines can affect the amount of warfarin in your blood  Do not make major changes to your diet while you take warfarin  Warfarin works best when you eat about the same amount of vitamin K every day  Vitamin K is found in green leafy vegetables and certain other foods  Ask for more information about what to eat when you are taking warfarin  § You will need to see your healthcare provider for follow-up visits when you are on warfarin  You will need regular blood tests  These tests are used to decide how much medicine you need  · Take your medicine as directed  Contact your healthcare provider if you think your medicine is not helping or if you have side effects  Tell him or her if you are allergic to any medicine   Keep a list of the medicines, vitamins, and herbs you take  Include the amounts, and when and why you take them  Bring the list or the pill bottles to follow-up visits  Carry your medicine list with you in case of an emergency  Know the warning signs of a stroke: The words BE FAST can help you remember and recognize warning signs of a stroke:  · B = Balance:  Sudden loss of balance    · E = Eyes:  Loss of vision in one or both eyes    · F = Face:  Face droops on one side    · A = Arms:  Arm drops when both arms are raised    · S = Speech:  Speech is slurred or sounds different    · T = Time:  Time to get help immediately       Recovery testing: Your healthcare provider will test your recovery 90 days (3 months) after your stroke  This may be done over the phone or in person  Your provider will ask how well you can do the activities you did before the stroke  He or she will also ask how well you can do your daily activities without help  Your provider may make recommendations for you based on your test  For example, you may need someone to help you walk safely  You may also need help with daily activities, such as getting dressed  Based on your answers, your provider may do this test again over time  Manage the effects of a stroke:   · Go to stroke rehabilitation (rehab) if directed  Rehab is a program run by specialists who will help you recover abilities you may have lost  Specialists include physical, occupational, and speech therapists  Physical therapists help you gain strength or keep your balance  Occupational therapists teach you new ways to do daily activities  Your therapy may include movements for everyday activities  An example is being able to raise yourself from a chair  A speech therapist helps you improve your ability to talk and swallow  · Make your home safe  Remove anything you might trip over  Tape electrical cords down  Keep paths clear throughout your home  Make sure your home is well lit  Put nonslip materials on surfaces that might be slippery  An example is your bathtub or shower floor  A cane or walker may help you keep your balance as you walk  Prevent another stroke:   · Manage health conditions  A condition such as diabetes can increase your risk for a stroke  Control your blood sugar level if you have hyperglycemia or diabetes  Take your prescribed medicines and check your blood sugar level as directed  · Check your blood pressure as directed  High blood pressure can increase your risk for a stroke  Follow your healthcare provider's directions for controlling your blood pressure  · Do not use nicotine products or illegal drugs  Nicotine and other chemicals in cigarettes and cigars can cause blood vessel damage  Nicotine and illegal drugs both increase your risk for a stroke  Ask your healthcare provider for information if you currently smoke or use drugs and need help to quit  E- cigarettes or smokeless tobacco still contain nicotine  Talk to your healthcare provider before you use these products  · Talk to your healthcare provider about alcohol  Alcohol can raise your blood pressure  The recommended limit is 2 drinks in a day for men and 1 drink in a day for women  Do not binge drink or save a week's worth of alcohol to drink in 1 or 2 days  Limit weekly amounts as directed by your provider  · Eat a variety of healthy foods  Healthy foods include whole-grain breads, low-fat dairy products, beans, lean meats, and fish  Eat at least 5 servings of fruits and vegetables each day  Choose foods that are low in fat, cholesterol, salt, and sugar  Eat foods that are high in potassium, such as potatoes and bananas  A dietitian can help you create healthy meal plans  · Maintain a healthy weight  Ask your healthcare provider how much you should weigh  Ask him or her to help you create a weight loss plan if you are overweight   He or she can help you create small goals if you have a lot of weight to lose  · Exercise as directed  Exercise can lower your blood pressure, cholesterol, weight, and blood sugar levels  Healthcare providers will help you create exercise goals  They can also help you make a plan to reach your goals  For example, you can break exercise into 10 minute periods, 3 times in the day  Find an exercise that you enjoy  This will make it easier for you to reach your exercise goals  · Manage stress  Stress can raise your blood pressure  Find new ways to relax, such as deep breathing or listening to music  What you need to know about depression after a stroke:  Talk to your healthcare provider if you have depression that continues or is getting worse  Your provider may be able to help treat your depression  Your provider can also recommend support groups for you to join  A support group is a place to talk with others who have had a stroke  It may also help to talk to friends and family members about how you are feeling  Tell your family and friends to let your healthcare provider know if they see any signs of depression:  · Extreme sadness    · Avoiding social interaction with family or friends    · A lack of interest in things you once enjoyed    · Irritability    · Trouble sleeping    · Low energy levels    · A change in eating habits or sudden weight gain or loss    Follow up with your doctor or neurologist as directed: You may need to come in for regular tests of your brain function  Your provider may refer you to a specialist, or to other kinds of care  An example is palliative (comfort) care  Your provider can also give information about respite care to anyone who helps care for you  Respite care is a service to help caregivers take a break or get more rest  Write down your questions so you remember to ask them during your visits  For support and more information:   · American Heart Association and American Stroke Association  [address] , [address] [facility]   Phone: 8- 098 - 902-6090  Web Address: https://www strong com/  Jia Humphries 2021 Information is for End User's use only and may not be sold, redistributed or otherwise used for commercial purposes  All illustrations and images included in CareNotes® are the copyrighted property of A D A M , Inc  or [address] [place] [address]  The above information is an  only  It is not intended as medical advice for individual conditions or treatments  Talk to your doctor, nurse or pharmacist before following any medical regimen to see if it is safe and effective for you  Transesophageal Echocardiogram   WHAT YOU NEED TO KNOW:   A transesophageal echocardiogram (KANCHAN) is a procedure used to check for problems with your heart  It will also show any problems in the blood vessels near your heart  Sound waves are sent to the heart through a tube inserted into your throat  The sound waves show the structure and function of your heart through pictures on a monitor  DISCHARGE INSTRUCTIONS:   Rest:  Rest until you are fully awake  You may be sleepy for a while after your procedure  Do not eat or drink until you are able to swallow normally:  Start with soft foods, such as oatmeal, yogurt, or applesauce  Once you can eat soft foods easily, you may begin to eat solid foods  Follow up with your healthcare provider as directed:  Write down your questions so you remember to ask them during your visits  Contact your healthcare provider if:   · You have a fever or chills  · You taste blood in your mouth  · You have a severe sore throat or difficulty swallowing  · You have questions or concerns about your condition or care  Seek care immediately or call 911 if:   · You have any of the following signs of a heart attack:      ?  Squeezing, pressure, or pain in your chest     ? and  any of the following:     § Discomfort or pain in your back, neck, jaw, stomach, or arm § Shortness of breath    § Nausea or vomiting    § Lightheadedness or a sudden cold sweat      © Copyright [employer] 2018 Information is for End User's use only and may not be sold, redistributed or otherwise used for commercial purposes  All illustrations and images included in CareNotes® are the copyrighted property of A D A M , Inc  or Misael Rajput  The above information is an  only  It is not intended as medical advice for individual conditions or treatments  Talk to your doctor, nurse or pharmacist before following any medical regimen to see if it is safe and effective for you

## 2022-01-24 NOTE — PROGRESS NOTES
Progress Note - Neurology   Lovella Paget 70 y o  male MRN: [id number]  Unit/Bed#: -01 Encounter: [id number]      Assessment/Plan     * Cerebrovascular accident (CVA) due to embolism [facility])  Assessment & Plan  70year old male with HTN, HLD, DM2, CAD s/p prior stent, history of basal cell CA who presented to the hospital on January 22, 2022 with complaints of difficulty using his right hand  Symptoms reportedly lasted 1 hour and he was able to use his hand but had some continued sensory changes  He was admitted for further work-up and CTA head and neck demonstrates no evidence of acute intracranial abnormality, severe plaque stenosis at the origin of the right vertebral artery noted as well as 2 8 x 1 2 cm soft tissue nodule in the posterior subcutaneous region at the level of C2  MRI brain without contrast demonstrates several tiny acute/early subacute lacunar infarcts in the left frontal lobe with no acute hemorrhage  KANCHAN completed today with no cardioembolic source for CVA identified  Patient also complaining of new onset of HA this morning  Plan:   - Will check stat CTH now to ensure no evidence of hemorrhage   - Continue with stroke pathway  - Echocardiogram completed, EF 60%, atria normal in size B/L   - KANCHAN completed this morning  EF 60%, no patent foramen ovale, no left atrial appendage thrombus, mild tricuspid regurgitation  Mild atherosclerosis of the descending thoracic aorta noted  No cardioembolic source for CVA identified    - Patient will need loop recorder placed as an outpatient for long term rhythm monitoring   - Fasting lipid panel reviewed, total cholesterol 145, triglycerides 139, HDL 35, LDL 82   - Hemoglobin A1c reviewed, 6 9  - Continue on ASA 81 mg QD and Plavix 75 mg QD x 21 days and at that point can remain on Plavix 75 mg QD if okay with cardiology team   - Goal normothermia, normotension, euglycemia    - PT/OT     - Stroke education  - Patient will need outpatient neurovascular team follow-up in 6-8 weeks  Office has been contacted to request appointment  Diabetes mellitus, type 2 (HCC)  Assessment & Plan  - Goal euglycemia    - Hemoglobin A1c reviewed, 6 9  - Management as per medicine team     Hypertension  Assessment & Plan  - Goal normotension    - Management as per medicine team     Hyperlipidemia  Assessment & Plan  - Fasting lipid panel reviewed as above  - Continue on atorvastatin 40 mg QPM     CAD (coronary artery disease)  Assessment & Plan  - Prior stent placement and follows outpatient with [facility] cardiology team                Raymon Officer will need follow up in 6-8 weeks with neurovascular attending or advance practitioner  He will not require outpatient neurological testing  Subjective:   Patient notes he is feeling well aside from new onset of right sided headache this morning  He notes that this is 1-2/10 in severity but he does not generally have headaches so this concerned him  He also notes that he thought the right side of his mouth was numb earlier but that has since resolved  He denies any other new symptoms and notes he is otherwise doing well      ROS:  History obtained from the patient  General ROS: negative for - chills, fatigue or fever  Ophthalmic ROS: negative for - blurry vision, decreased vision or double vision  Respiratory ROS: negative for - cough or shortness of breath  Cardiovascular ROS: negative for - chest pain or palpitations  Gastrointestinal ROS: negative for - abdominal pain or nausea/vomiting  Musculoskeletal ROS: negative for - gait disturbance or muscular weakness  Neurological ROS: positive for - headaches  negative for - confusion, dizziness, gait disturbance, numbness/tingling, speech problems, visual changes or weakness    Medications  Scheduled Meds:  Current Facility-Administered Medications   Medication Dose Route Frequency Provider Last Rate    acetaminophen  650 mg Oral Q6H PRN Laxmi Marinelli PA-C      aspirin  81 mg Oral Daily Chente Hong PA-C      atorvastatin  40 mg Oral QPM [place], [place]      clopidogrel  75 mg Oral Daily FEDE Crandall      enoxaparin  40 mg Subcutaneous Daily [place], [place]      fluticasone  2 spray Each Nare Daily [place], [place]      insulin detemir  10 Units Subcutaneous HS [place], [place]      insulin lispro  1-5 Units Subcutaneous HS [place], [place]      insulin lispro  1-6 Units Subcutaneous TID [place]      insulin lispro  5 Units Subcutaneous TID With Meals [place], [place]      loratadine  10 mg Oral Daily [place], [place]      pantoprazole  40 mg Oral Daily [place], [place]      tamsulosin  0 8 mg Oral Daily With SOFIA Archibald       Continuous Infusions:   PRN Meds:   acetaminophen    Vitals: Blood pressure 141/64, pulse 73, temperature 98 °F (36 7 °C), resp  rate 18, height 6' 3" (1 905 m), weight 97 1 kg (214 lb 1 1 oz), SpO2 97 %  ,Body mass index is 26 76 kg/m²      Physical Exam: /63   Pulse 65   Temp 97 6 °F (36 4 °C)   Resp 14   Ht 6' 3" (1 905 m)   Wt 97 1 kg (214 lb)   SpO2 95%   BMI 26 75 kg/m²   General appearance: alert and oriented, in no acute distress  Head: Normocephalic, without obvious abnormality, atraumatic  Eyes: negative findings: lids and lashes normal, conjunctivae and sclerae normal, pupils equal, round, reactive to light and accomodation and visual fields full to confrontation  Lungs: clear to auscultation bilaterally  Heart: regular rate and rhythm  Abdomen: soft, non-tender; bowel sounds normal; no masses,  no organomegaly  Extremities: extremities normal, warm and well-perfused; no cyanosis, clubbing, or edema  Skin: Skin color, texture, turgor normal  No rashes or lesions  Neurologic: Mental status: Alert, oriented, thought content appropriate  Cranial nerves: II: visual field normal, II: pupils equal, round, reactive to light and accommodation, III,VII: ptosis no ptosis noted, III,IV,VI: extraocular muscles extra-ocular motions intact, V: facial light touch sensation normal bilaterally, VII: upper facial muscle function normal bilaterally, VII: lower facial muscle function normal bilaterally, XII: tongue strength normal  Sensory: normal, Grossly intact to crude touch  Motor: Motor strength 5/5 B/L UE and LE  Coordination: finger to nose normal bilaterally    Labs  Recent Results (from the past 24 hour(s))   Fingerstick Glucose (POCT)    Collection Time: 01/23/22  9:38 AM   Result Value Ref Range    POC Glucose 184 (H) 65 - 140 mg/dl   Fingerstick Glucose (POCT)    Collection Time: 01/23/22 12:09 PM   Result Value Ref Range    POC Glucose 157 (H) 65 - 140 mg/dl   Echo complete w/ contrast if indicated    Collection Time: 01/23/22  1:30 PM   Result Value Ref Range    LA size 2 7 cm    LVPWd 1 10 cm    MV E' Tissue Velocity Septal 8 cm/s    IVSd 1 55 cm    LV DIASTOLIC VOLUME (MOD BIPLANE) 2D 141 mL    LEFT VENTRICLE SYSTOLIC VOLUME (MOD BIPLANE) 2D 38 mL    Left ventricular stroke volume (2D) 103 00 mL    A4C EF 62 %    LVIDd 5 40 7 44 - 11 09 cm    LVIDS 3 10 2 1 - 4 0 cm    FS 43 28 - 44 %    Asc Ao 3 3 cm    Ao root 3 70 cm    RVID d 3 7 cm    E wave deceleration time 289 ms    MV Peak E Ahmet 70 cm/s    MV Peak A Ahmet 1 04 m/s    LALITHA A4C 21 8 cm2    RAA A4C 17 8 cm2    MV stenosis pressure 1/2 time 0 ms    ZLVIDS -5 20     LV EF 60    Fingerstick Glucose (POCT)    Collection Time: 01/23/22  4:43 PM   Result Value Ref Range    POC Glucose 168 (H) 65 - 140 mg/dl   Fingerstick Glucose (POCT)    Collection Time: 01/23/22  6:45 PM   Result Value Ref Range    POC Glucose 112 65 - 140 mg/dl   Fingerstick Glucose (POCT)    Collection Time: 01/23/22  7:50 PM   Result Value Ref Range    POC Glucose 107 65 - 140 mg/dl   Fingerstick Glucose (POCT)    Collection Time: 01/23/22  9:03 PM   Result Value Ref Range    POC Glucose 115 65 - 140 mg/dl   CBC and differential Collection Time: 01/24/22  5:07 AM   Result Value Ref Range    WBC 7 52 4 31 - 10 16 Thousand/uL    RBC 4 93 3 88 - 5 62 Million/uL    Hemoglobin 15 9 12 0 - 17 0 g/dL    Hematocrit 46 8 36 5 - 49 3 %    MCV 95 82 - 98 fL    MCH 32 3 26 8 - 34 3 pg    MCHC 34 0 31 4 - 37 4 g/dL    RDW 12 6 11 6 - 15 1 %    MPV 9 5 8 9 - 12 7 fL    Platelets 328 836 - 578 Thousands/uL    nRBC 0 /100 WBCs    Neutrophils Relative 61 43 - 75 %    Immat GRANS % 0 0 - 2 %    Lymphocytes Relative 27 14 - 44 %    Monocytes Relative 9 4 - 12 %    Eosinophils Relative 2 0 - 6 %    Basophils Relative 1 0 - 1 %    Neutrophils Absolute 4 66 1 85 - 7 62 Thousands/µL    Immature Grans Absolute 0 01 0 00 - 0 20 Thousand/uL    Lymphocytes Absolute 2 04 0 60 - 4 47 Thousands/µL    Monocytes Absolute 0 64 0 17 - 1 22 Thousand/µL    Eosinophils Absolute 0 11 0 00 - 0 61 Thousand/µL    Basophils Absolute 0 06 0 00 - 0 10 Thousands/µL   Basic metabolic panel    Collection Time: 01/24/22  5:07 AM   Result Value Ref Range    Sodium 138 136 - 145 mmol/L    Potassium 4 0 3 5 - 5 3 mmol/L    Chloride 104 100 - 108 mmol/L    CO2 24 21 - 32 mmol/L    ANION GAP 10 4 - 13 mmol/L    BUN 25 5 - 25 mg/dL    Creatinine 0 89 0 60 - 1 30 mg/dL    Glucose 139 65 - 140 mg/dL    Calcium 9 5 8 3 - 10 1 mg/dL    eGFR 86 ml/min/1 73sq m   Fingerstick Glucose (POCT)    Collection Time: 01/24/22  7:30 AM   Result Value Ref Range    POC Glucose 124 65 - 140 mg/dl     Imaging  No new neuro imaging available for review  VTE Prophylaxis: Enoxaparin (Lovenox)    Counseling / Coordination of Care  Total time spent today 30 minutes  Greater than 50% of total time was spent with the patient and / or family counseling and / or coordination of care  A description of the counseling / coordination of care: Time spent reviewing plan of care with patient at bedside as well as updating bedside RN and medicine team regarding plan for stat [facility] now given new onset of headache

## 2022-01-24 NOTE — ANESTHESIA POSTPROCEDURE EVALUATION
Post-Op Assessment Note    CV Status:  Stable  Pain Score: 0    Pain management: adequate     Mental Status:  Alert and awake   Hydration Status:  Euvolemic   PONV Controlled:  Controlled   Airway Patency:  Patent and adequate      Post Op Vitals Reviewed: Yes      Staff: CRNA         No complications documented      BP   130/66   Temp     Pulse  64   Resp   12   SpO2   95   /

## 2022-01-24 NOTE — PROGRESS NOTES
Medical student note for educational purposes    Progress Note - Yamilka Platt 70 y o  male MRN: [id number]    Unit/Bed#: -01 Encounter: [id number]      Assessment:  Mr Marlin Maradiaga is a 69 y/o M with a PMH of HTN, diabetes, CAD, and PAD who presented to the ED with a transient episode of not being able to use R hand  · MRI showed tiny acute/subacute lacunar infarcts in L frontal lobe that could possibly be from emboli  · CTA showed severe R vertebral artery stenosis  · Neuro was consulted and their recs were appreciated  · Patient has been at his baseline since this episode with stable vitals and an insignificant neuro exam  He is okay for discharge  Plan:  Stroke-like symptoms, R hnd weakness  · MRI showed tiny acute/subacute lacunar infarcts in L frontal lobe that confirm a CVA  · He will take 75mg plavix and 81mg aspirin for 21 days and continue 325mg aspirin afterwards  · TTE showed EF at 60%  KANCHAN showed no cardioembolic source for the CVA  No thrombus seen in atrial appendage  · Neuro recommends a loop recorder for outpatient cardiac monitoring due to negative KANCHAN  · Mr Marlin Maradiaga will also follow up with neuro in 2-3 months  CAD  · Patient s/p stent and followed by cardiologist   · Continue aspirin and 40mg lipitor (switched from crestor)  Diabetes mellitus, type II  · Last A1C 7 0  · Patient on amaryl, levemir, and novolog  Hyperlipidemia  · Patient switched to 40mg lipitor  Hypertension  · Patient on 25mg metoprolol     Subjective:   Patient was alert and resting at bedside  No complaints other than hunger  He appears to be neurologically stable  Objective:     Vitals: Blood pressure 140/63, pulse 65, temperature 97 6 °F (36 4 °C), resp  rate 14, height 6' 3" (1 905 m), weight 97 1 kg (214 lb), SpO2 95 %  ,Body mass index is 26 75 kg/m²        Intake/Output Summary (Last 24 hours) at 1/24/2022 1111  Last data filed at 1/24/2022 0956  Gross per 24 hour   Intake 400 ml   Output -- Net 400 ml       Physical Exam  Constitutional:       Appearance: Normal appearance  HENT:      Head: Normocephalic and atraumatic  Mouth/Throat:      Mouth: Mucous membranes are moist    Eyes:      Extraocular Movements: Extraocular movements intact  Pupils: Pupils are equal, round, and reactive to light  Cardiovascular:      Rate and Rhythm: Normal rate and regular rhythm  Heart sounds: Normal heart sounds  No murmur heard  No friction rub  No gallop  Pulmonary:      Effort: Pulmonary effort is normal  No respiratory distress  Breath sounds: No wheezing or rales  Abdominal:      General: Bowel sounds are normal    Musculoskeletal:         General: No swelling  Skin:     General: Skin is warm  Capillary Refill: Capillary refill takes less than 2 seconds  Neurological:      Mental Status: He is alert and oriented to person, place, and time  GCS: GCS eye subscore is 4  GCS verbal subscore is 5  GCS motor subscore is 6  Cranial Nerves: Cranial nerves are intact  No facial asymmetry  Sensory: Sensation is intact  Motor: Motor function is intact  No weakness  Comments: 5/5 strength b/l       Imaging  KANCHAN  · "Left Ventricle: Left ventricular cavity size is normal  The left ventricular ejection fraction is 60%  Systolic function is normal  Wall motion is normal   · Atrial Septum: No patent foramen ovale confirmed at rest by color flow Doppler and by bubble study  · Left Atrial Appendage: There is normal function  There is no thrombus  · Mitral Valve: There is mild annular calcification  · Tricuspid Valve: There is mild regurgitation  · Aorta: There is no aneurysm in the aorta  Mild athersclerosis of the descendig thoracic aorta   No cardioembolic source for CVA identified "      Invasive Devices  Report    Peripheral Intravenous Line            Peripheral IV 01/22/22 Right Antecubital 1 day                Lab, Imaging and other studies: I have personally reviewed pertinent reports      VTE Pharmacologic Prophylaxis: Enoxaparin (Lovenox)  VTE Mechanical Prophylaxis: sequential compression device

## 2022-01-24 NOTE — ASSESSMENT & PLAN NOTE
- Prior stent placement and follows outpatient with [facility] cardiology team

## 2022-01-24 NOTE — INCIDENTAL FINDINGS
The following findings require follow up:  Radiographic finding   Finding: Several tiny acute/early subacute lacunar infarcts in the left frontal lobe      Follow up required: yes   Follow up should be done within 2-3 month(s)    Please notify the following clinician to assist with the follow up:   Neurology

## 2022-01-24 NOTE — ANESTHESIA PREPROCEDURE EVALUATION
Procedure:  KANCHAN    Relevant Problems   CARDIO   (+) CAD (coronary artery disease)   (+) History of PTCA   (+) Hyperlipidemia   (+) Hypertension      ENDO   (+) Diabetes mellitus, type 2 (HCC)      NEURO/PSYCH   (+) Cerebrovascular accident (CVA) due to embolism ([facility] [address] )      History Comments History Comments   Colon polyp  Diabetes mellitus ([facility] [address] )    Coronary artery disease  Hypertension    Hyperlipidemia  Basal cell carcinoma left posterior auticular of the ear        Surgical History     Current as of 22  COLONOSCOPY CORONARY ANGIOPLASTY WITH STENT PLACEMENT   CATARACT EXTRACTION CYST REMOVAL   ILIAC ARTERY STENT MOHS SURGERY     Substance History     Current as of 22  Smoking Status: Former Smoker   Quit Smokin17   Smokeless Tobacco Status: Never Used   Alcohol use: Never   Drug use: Never       Physical Exam    Airway    Mallampati score: III  TM Distance: >3 FB  Neck ROM: full     Dental   implants,     Cardiovascular  Cardiovascular exam normal    Pulmonary  Pulmonary exam normal     Other Findings  multiple permanent implants and missing teeth   * Stroke-like symptoms  Assessment & Plan  Patient reports trying to eat a bowl of ice cream this afternoon and was unable to lift or properly manipulate the spoon 2/2 right hand weakness and associated tingling    Weakness has subsided at this time and patient is currently only complaining of his right 3rd and 4th finger "feeling different than the other hand " Denies fever, chill, recent sick contact, headache, visual disturbance, dizzy/lightheadedness, syncope, chest pain, palpitations, SOB, ABD pain, N/V/D, current numbness/tingling/weakness, lower extremity pain/swelling, or other symptoms      /61   Pulse 55   Temp 98 2 °F (36 8 °C) (Temporal)   Resp 17   Wt 99 8 kg (220 lb)   SpO2 96%   BMI 27 50 kg/m²      · Unable to manipulate spoon earlier 2/2 right upper extremity weakness and tingling  · Weakness and majority of tingling has subsided and patient currently reports that his right 3rd and 4th finger "feel different than the other hand"  · CTA head/neck showing severe plaque stenosis at the origin of the right vertebral artery and a 2 8 x 1 2 cm soft tissue nodule in the posterior subcutaneous region at the level of C2 (nodule unable to be appreciated on palpation), without other abnormality  · Physical exam without focal neurologic deficit at this time  · EKG sinus bradycardia HR 56 w/anterior T-wave abnormality  · HS troponin 0H:5  · Afebrile; no leuokocytosis; no acute electrolyte abnormalities; not hypoglycemic  · Echo and MRI ordered  · Will follow stroke protocol overnight  · Continue home  mg daily and atorvastatin per stroke protocol, permissive hypertension and neuro checks per stroke protocol  · Neurology consulted        CAD (coronary artery disease)  Assessment & Plan  · S/p PTCA in 2003  · Denies chest pain  · Troponin 5  · EKG sinus bradycardia HR 56 with anterior T-wave abnormality  · Continue home ASA 325mg daily and statin  · Holding home metoprolol in setting of permissive hypertension per stroke protocol     Hypertension  Assessment & Plan  · /93 HR 64  · Hold home HTN medications in the setting of permissive hypertension per stroke protocol  · Monitor BP per stroke protocol     Diabetes mellitus, type 2 (HCC)  Assessment & Plan           Lab Results   Component Value Date     HGBA1C 7 0 (H) 12/20/2021      · Blood glucose 101  · Ordering half dose of home detemir (equivalent) as patients latest blood glucose was euglycemic, and will continuenovolog 5U TID w/ meals  · Correctional SSI  · Hypoglycemia protocol     Hyperlipidemia  Assessment & Plan  · Atorvastatin ordered per stroke protocol         TTE yesterday  Anesthesia Plan  ASA Score- 3     Anesthesia Type- IV sedation with anesthesia with ASA Monitors  Additional Monitors:   Airway Plan:           Plan Factors-    Chart reviewed  EKG reviewed  Imaging results reviewed  Existing labs reviewed  Patient summary reviewed  Patient is not a current smoker  Induction- intravenous  Postoperative Plan-     Informed Consent- Anesthetic plan and risks discussed with patient  I personally reviewed this patient with the CRNA  Discussed and agreed on the Anesthesia Plan with the CRNA  Amadou Gonzales   Left Ventricle: Left ventricular cavity size is normal  The left ventricular ejection fraction is 60%  Systolic function is normal  Wall motion is normal  Diastolic function is mildly abnormal, consistent with grade I (abnormal) relaxation      Right Ventricle: Right ventricular cavity size is normal  Systolic function is normal

## 2022-01-24 NOTE — ASSESSMENT & PLAN NOTE
70year old male with HTN, HLD, DM2, CAD s/p prior stent, history of basal cell CA who presented to the hospital on January 22, 2022 with complaints of difficulty using his right hand  Symptoms reportedly lasted 1 hour and he was able to use his hand but had some continued sensory changes  He was admitted for further work-up and CTA head and neck demonstrates no evidence of acute intracranial abnormality, severe plaque stenosis at the origin of the right vertebral artery noted as well as 2 8 x 1 2 cm soft tissue nodule in the posterior subcutaneous region at the level of C2  MRI brain without contrast demonstrates several tiny acute/early subacute lacunar infarcts in the left frontal lobe with no acute hemorrhage  KANCHAN completed today with no cardioembolic source for CVA identified  Patient also complaining of new onset of HA this morning  Plan:   - Will check stat CTH now to ensure no evidence of hemorrhage   - Continue with stroke pathway  - Echocardiogram completed, EF 60%, atria normal in size B/L   - KANCHAN completed this morning  EF 60%, no patent foramen ovale, no left atrial appendage thrombus, mild tricuspid regurgitation  Mild atherosclerosis of the descending thoracic aorta noted  No cardioembolic source for CVA identified    - Patient will need loop recorder placed as an outpatient for long term rhythm monitoring   - Fasting lipid panel reviewed, total cholesterol 145, triglycerides 139, HDL 35, LDL 82   - Hemoglobin A1c reviewed, 6 9  - Continue on ASA 81 mg QD and Plavix 75 mg QD x 21 days and at that point can remain on Plavix 75 mg QD if okay with cardiology team   - Goal normothermia, normotension, euglycemia    - PT/OT  - Stroke education  - Patient will need outpatient neurovascular team follow-up in 6-8 weeks  Office has been contacted to request appointment

## 2022-01-24 NOTE — DISCHARGE SUMMARY
[address] [facility]  Discharge- Yamilka Platt 1950, 70 y o  male MRN: [id number]  Unit/Bed#: -01 Encounter: [id number]  Primary Care Provider: Rand Major MD   Date and time admitted to hospital: 1/22/2022  5:33 PM    Hypertension  Assessment & Plan  · /93 HR 64  · Resume home meds   · Outpatient follow up with PCP    Hyperlipidemia  Assessment & Plan  · Atorvastatin ordered per stroke protocol    Diabetes mellitus, type 2 ([facility] [address] )  Assessment & Plan    Lab Results   Component Value Date    HGBA1C 7 0 (H) 12/20/2021     · Resume home regimen upon discharge    CAD (coronary artery disease)  Assessment & Plan  · S/p PTCA in 2003  · Denies chest pain  · Troponin 5  · EKG sinus bradycardia HR 56 with anterior T-wave abnormality  · Continue home ASA 81 mg daily and statin for additional 20 days, then stop plavix  · Continue metoprolol, Lipitor    * Cerebrovascular accident (CVA) due to embolism [facility])  Assessment & Plan  Patient reports trying to eat a bowl of ice cream this afternoon and was unable to lift or properly manipulate the spoon 2/2 right hand weakness and associated tingling  Weakness has subsided at this time and patient is currently only complaining of his right 3rd and 4th finger "feeling different than the other hand " Denies fever, chill, recent sick contact, headache, visual disturbance, dizzy/lightheadedness, syncope, chest pain, palpitations, SOB, ABD pain, N/V/D, current numbness/tingling/weakness, lower extremity pain/swelling, or other symptoms  /61   Pulse 55   Temp 98 2 °F (36 8 °C) (Temporal)   Resp 17   Wt 99 8 kg (220 lb)   SpO2 96%   BMI 27 50 kg/m²     · Unable to manipulate spoon earlier 2/2 right upper extremity weakness and tingling  · Weakness and majority of tingling has subsided and patient currently reports that his right 3rd and 4th finger "feel different than the other hand"   Currently right hand weakness has resolved and patient has back to baseline  · CTA head/neck showing severe plaque stenosis at the origin of the right vertebral artery and a 2 8 x 1 2 cm soft tissue nodule in the posterior subcutaneous region at the level of C2 (nodule unable to be appreciated on palpation), without other abnormality  · Physical exam without focal neurologic deficit at this time  · EKG sinus bradycardia HR 56 w/anterior T-wave abnormality  · HS troponin 0H:5  · Afebrile; no leuokocytosis; no acute electrolyte abnormalities; not hypoglycemic  · MRI brain revealed Several tiny acute/early subacute lacunar infarcts in the left frontal lobe  · TTE and KANCHAN revealed no blots seen  · Neurology recommended switch Crestor to Lipitor, Aspirin and plavix for 21 days then continue Aspirin and discontinue plavix  Will have event chuy record as outpatient  · Outpatient follow up with neurology         Discharging Resident Physician: Lyndsay Gil MD  Attending: Mecca Gao MD  PCP: Cassandra Sousa MD  Admission Date: 1/22/2022  Discharge Date: 01/24/22    Disposition:     Home    Reason for Admission:  Right hand weakness    Consultations During Hospital Stay:  · Neurology    Procedures Performed:     · None    Significant Findings / Test Results:     CT head wo contrast   Final Result by Oriana Alvarez DO (01/24 1323)      No intracranial hemorrhage  Known tiny lacunar infarcts in the left frontal lobe on recent MRI are not clearly depicted by CT imaging  Otherwise, there is preservation of gray-white matter differentiation without evidence for new/progressive infarct  Workstation performed: [id number]         MRI brain wo contrast   Final Result by Ferdinand Orozco MD (01/23 6106)      Several tiny acute/early subacute lacunar infarcts in the left frontal lobe  No acute hemorrhage  The study was marked in [facility] for immediate notification         Workstation performed: [id number]         CTA head and neck with and without contrast   Final Result by Ananya Ca MD (01/22 1935)         1  No evidence of acute internal hemorrhage  2  Severe plaque stenosis at the origin of the right vertebral artery  3   2 8 x 1 2 cm soft tissue nodule /region identified in the posterior subcutaneous region at the level of C2, correlate clinically  Workstation performed: [id number]             Incidental Findings:   · Documented    Test Results Pending at Discharge (will require follow up): · None     Outpatient Tests Requested:  · Event loop recorder    Complications:  None    Hospital Course: Juan Tolbert is a 70 y o  male patient who originally presented to the hospital on 1/22/2022 due to right hand weakness  Patient was admitted for stroke pathway  CTA head revealed severe plaque stenosis at the origin of the right vertebral artery  MRI brain revealed tiny acute lacunar infarcts in the left frontal lobe  TTE and KANCHAN reviewed no thrombosis in the heart  Neurology was consulted and recommend aspirin and Plavix for 21 days then discontinue Plavix and outpatient follow-up with Neurology in 2-3 months  Patient had new onset headaches this morning before discharge  Repeat CT head was negative for intracranial abnormality  Patient is stable for discharge today  Advised patient outpatient follow-up with Neurology and PCP  Condition at Discharge: good     Discharge Day Visit / Exam:     Subjective:  Patient feels okay, pain denies acute complaints  Vitals: Blood Pressure: 140/63 (01/24/22 1034)  Pulse: 65 (01/24/22 1034)  Temperature: 97 6 °F (36 4 °C) (01/24/22 1000)  Temp Source: Temporal (01/24/22 0901)  Respirations: 14 (01/24/22 1015)  Height: 6' 3" (190 5 cm) (01/24/22 0940)  Weight - Scale: 97 1 kg (214 lb) (01/24/22 0940)  SpO2: 95 % (01/24/22 1034)  Exam:   Physical Exam  Constitutional:       General: He is not in acute distress  Appearance: He is well-developed  He is not diaphoretic     HENT: Head: Normocephalic  Mouth/Throat:      Pharynx: No oropharyngeal exudate  Eyes:      Pupils: Pupils are equal, round, and reactive to light  Cardiovascular:      Rate and Rhythm: Normal rate and regular rhythm  Heart sounds: No murmur heard  Pulmonary:      Effort: Pulmonary effort is normal  No respiratory distress  Breath sounds: No wheezing or rales  Abdominal:      General: Bowel sounds are normal       Palpations: Abdomen is soft  Tenderness: There is no abdominal tenderness  Musculoskeletal:         General: No tenderness  Normal range of motion  Cervical back: Normal range of motion  Skin:     General: Skin is warm  Neurological:      Mental Status: He is alert and oriented to person, place, and time  Discussion with Family:  Patient    Discharge instructions/Information to patient and family:   See after visit summary for information provided to patient and family  Provisions for Follow-Up Care:  See after visit summary for information related to follow-up care and any pertinent home health orders  Planned Readmission:  No     Discharge Medications:  See after visit summary for reconciled discharge medications provided to patient and family        ** Please Note: This note has been constructed using a voice recognition system **

## 2022-01-24 NOTE — CASE MANAGEMENT
Case Management Assessment & Discharge Planning Note    Patient name Yenifer Alvarez  Location [address] 305/-90 MRN [id number]  : 1950 Date 2022       Current Admission Date: 2022  Current Admission Diagnosis:Cerebrovascular accident (CVA) due to embolism [facility])   Patient Active Problem List    Diagnosis Date Noted    [facility] to [facility] 2022    Hyperlipidemia     Hypertension     CAD (coronary artery disease) 2020    History of PTCA 2020    Diabetes mellitus, type 2 ([facility] [address] ) 2020      LOS (days): 1  Geometric Mean LOS (GMLOS) (days):   Days to GMLOS:     OBJECTIVE:    Risk of Unplanned Readmission Score: 9         Current admission status: Inpatient       Preferred Pharmacy:   [address] [place] [address] - [address]  [address] [address]  Phone: [phone] Fax: [facility], [address] [place] [address]  Phone: [phone] Fax: [phone]    Primary Care Provider: Millicent Ann MD    Primary Insurance: MEDICARE  Secondary Insurance: [facility]    ASSESSMENT:  [address], [address] Representative - Spouse   Primary Phone: [phone] (Home)               Advance Directives  Does patient have a [address] [place] [address] Avenue?: Yes (Reports wife is POA, CM asked pt to provide documentation)  Does patient have Advance Directives?: Yes  Advance Directives: Power of  for health care,Power of  for finance  Primary Contact: Roman Prado ([phone])    Readmission Root Cause  30 Day Readmission: No    Patient Information  Admitted from[de-identified] Home  Mental Status: Alert  During Assessment patient was accompanied by: Not accompanied during assessment  Assessment information provided by[de-identified] Patient  Primary Caregiver: Self  Support Systems: Spouse/significant other  [place] of Residence: [address] Terrell do you live in?: [address] [facility] entry access options   Select all that apply : Stairs  Number of steps to enter home : 3  Do the steps have railings?: Yes  Type of Current Residence: 2 story home  Upon entering residence, is there a bedroom on the main floor (no further steps)?: No  A bedroom is located on the following floor levels of residence (select all that apply):: 2nd Floor  Upon entering residence, is there a bathroom on the main floor (no further steps)?: No  Indicate which floors of current residence have a bathroom (select all the apply):: 2nd Floor  Number of steps to 2nd floor from main floor: One Flight  In the last 12 months, was there a time when you were not able to pay the mortgage or rent on time?: No  In the last 12 months, how many places have you lived?: 1  In the last 12 months, was there a time when you did not have a steady place to sleep or slept in a shelter (including now)?: No  Homeless/housing insecurity resource given?: N/A  Living Arrangements: Lives w/ Spouse/significant other    Activities of Daily Living Prior to Admission  Functional Status: Independent  Completes ADLs independently?: Yes  Ambulates independently?: Yes  Does patient use assisted devices?: No  Does patient currently own DME?: No  Does patient have a history of Outpatient Therapy (PT/OT)?: No  Does the patient have a history of Short-Term Rehab?: No  Does patient have a history of HHC?: No  Does patient currently have [place] [id number]?: No    Patient Information Continued  Income Source: Pension/half-way  Does patient have prescription coverage?: Yes  Within the past 12 months, you worried that your food would run out before you got the money to buy more : Never true  Within the past 12 months, the food you bought just didnt last and you didnt have money to get more : Never true  Food insecurity resource given?: N/A  Does patient receive dialysis treatments?: No  Does patient have a history of substance abuse?: No  Does patient have a history of Mental Health Diagnosis?: No    Means of Transportation  Means of Transport to Appts[de-identified] Drives Self  In the past 12 months, has lack of transportation kept you from medical appointments or from getting medications?: No  In the past 12 months, has lack of transportation kept you from meetings, work, or from getting things needed for daily living?: No  Was application for public transport provided?: N/A    DISCHARGE DETAILS:    Discharge planning discussed with[de-identified] Patient  Freedom of Choice: Yes  Comments - Freedom of Choice: Discussed freedom of choice as it relates to discharge planning based on treatment team recommendations  CM contacted family/caregiver?: No- see comments (Pt decled, reported he would update his wife)  Were Treatment Team discharge recommendations reviewed with patient/caregiver?: Yes  Did patient/caregiver verbalize understanding of patient care needs?: Yes  Were patient/caregiver advised of the risks associated with not following Treatment Team discharge recommendations?: Yes    Contacts  Patient Contacts: Patient  Contact Method: In Person  Reason/Outcome: Continuity of [address]          Is the patient interested in [facility] at discharge?: No    DME Referral Provided  Referral made for DME?: No    Other Referral/Resources/Interventions Provided:  Interventions: None Indicated    Treatment Team Recommendation: Home  Discharge Destination Plan[de-identified] Home  Transport at Discharge : Family (Wife)     Additional Comments: Pt reports being vaccinated for Covid

## 2022-01-25 ENCOUNTER — PATIENT OUTREACH (OUTPATIENT)
Dept: CASE MANAGEMENT | Facility: OTHER | Age: 72
End: 2022-01-25

## 2022-01-25 NOTE — PROGRESS NOTES
In basket message received with patient discharge  Chart reviewed  I left a message on patient's voicemial with my contact information

## 2022-01-26 ENCOUNTER — PATIENT OUTREACH (OUTPATIENT)
Dept: CASE MANAGEMENT | Facility: OTHER | Age: 72
End: 2022-01-26

## 2022-01-27 ENCOUNTER — PATIENT OUTREACH (OUTPATIENT)
Dept: CASE MANAGEMENT | Facility: OTHER | Age: 72
End: 2022-01-27

## 2022-01-27 RX ORDER — ATORVASTATIN CALCIUM 40 MG/1
40 TABLET, FILM COATED ORAL EVERY EVENING
Qty: 30 TABLET | Refills: 0 | Status: SHIPPED | OUTPATIENT
Start: 2022-01-27 | End: 2022-03-11

## 2022-01-27 NOTE — PROGRESS NOTES
I spoke to [address] [place] [address] who reports he is missing a prescription for Lipitor  He reports the pharmacist told him it was cancelled  I advised him I will tiger text the physician who discharged him and call him back

## 2022-01-27 NOTE — PROGRESS NOTES
I called Castillo Fernandez after receiving a voicemail from him  He He is feeling well and "back to baseline"  He is taking his medications as directed  He reports he read his discharge instructions and has no questions  He has an appointment with his PCP 2/7 and will see cardiology tomorrow  He contacted the neurology office and is waiting for a return call to schedule an appointment  He has been asymptomatic since discharge  His speech is strong and clear  He knows the sign of stroke and understands it is a medical emergency  He thanked me for calling but declined further outreach  I asked him to call me if I can be of assistance

## 2022-01-27 NOTE — PROGRESS NOTES
I received a tiger text from Dr Inge Jerry who stated he would resend the Lipitor to [facility]

## 2022-01-28 NOTE — PHYSICIAN ADVISOR
Current patient class: Inpatient  The patient is currently on Hospital Day: 3 at [address]      The patient was admitted to the hospital at  2:17 PM on 1/23/22 for the following diagnosis:  Hand weakness [R29 898]  Upper extremity weakness [R29 898]  Stroke-like symptoms [R29 90]  Limb ataxia in one extremity [R27 0]       There was documentation in the medical record of an expected length of stay of at least 2 midnights  The patient was therefore expected to satisfy the 2 midnight benchmark and given the 2 midnight presumption was appropriate for INPATIENT ADMISSION  Given this expectation of a satisfying stay, CMS instructs us that the patient is most often appropriate for inpatient admission under part A provided medical necessity is documented in the chart  After review of the relevant documentation, labs, vital signs and test results, the patient is appropriate for INPATIENT ADMISSION  Admission to the hospital as an inpatient is a complex decision making process which requires the practitioner to consider the patients presenting complaint, history and physical examination and all relevant testing  With this in mind, in this case, the patient was deemed appropriate for INPATIENT ADMISSION  After review of the documentation and testing available at the time of the admission, I concur with this clinical determination of medical necessity  For the reason noted, the patient was discharged before reaching 2 midnights as an inpatient  Rationale is as follows: The patient is a 70 yrs old Male who presented to the ED at 1/22/2022  5:33 PM with a chief complaint of Extremity Weakness (pt states around 1615 to get a bowl of ice cream and couldnt lift with right hand  pt states to have weakness/tingling in right hand that has subsided  pt denies further symptoms at this time  )  Patient was seen by Neurology on day 2 of admission    MRI was done which did show acute lacunar infarct which appeared to be embolic  Patient was started on anti-platelet therapy and a KANCHAN was ordered  Given the above the need for further diagnostic workup for reason for acute CVA the patient did cross the 2 midnight benchmark as set by Medicare is inpatient status appropriate  The patients vitals on arrival were   ED Triage Vitals   Temperature Pulse Respirations Blood Pressure SpO2   01/22/22 1732 01/22/22 1732 01/22/22 1732 01/22/22 1732 01/22/22 1732   98 2 °F (36 8 °C) 64 17 142/93 97 %      Temp Source Heart Rate Source Patient Position - Orthostatic VS BP Location FiO2 (%)   01/22/22 1732 01/22/22 1732 01/22/22 1732 01/22/22 1732 --   Temporal Monitor Sitting Right arm       Pain Score       01/22/22 2055       No Pain           Past Medical History:   Diagnosis Date    Basal cell carcinoma 11/30/2021    left posterior auticular of the ear     Colon polyp     Coronary artery disease     Diabetes mellitus ([address] )     Hyperlipidemia     Hypertension      Past Surgical History:   Procedure Laterality Date    CATARACT EXTRACTION      COLONOSCOPY      CORONARY ANGIOPLASTY WITH STENT PLACEMENT  2003    CYST REMOVAL  2016    ILIAC ARTERY STENT Bilateral 01/08/2016    MOHS SURGERY Left 01/11/2022    Left posterior auricular of the ear           Consults have been placed to:   IP CONSULT TO NEUROLOGY  IP CONSULT TO CARDIOLOGY    Vitals:    01/24/22 1000 01/24/22 1015 01/24/22 1034 01/24/22 1614   BP: 130/66 151/73 140/63 139/64   Pulse: 63 60 65 66   Resp: 12 14  18   Temp: 97 6 °F (36 4 °C)   (!) 97 4 °F (36 3 °C)   TempSrc:       SpO2: 95% 95% 95% 96%   Weight:       Height:           Most recent labs:    No results for input(s): WBC, HGB, HCT, PLT, K, NA, CALCIUM, BUN, CREATININE, LIPASE, AMYLASE, INR, TROPONINI, CKTOTAL, AST, ALT, ALKPHOS, BILITOT in the last 72 hours  Scheduled Meds:  Continuous Infusions:No current facility-administered medications for this encounter      PRN Meds:     Surgical procedures (if appropriate):

## 2022-02-01 ENCOUNTER — TELEPHONE (OUTPATIENT)
Dept: NEUROLOGY | Facility: CLINIC | Age: 72
End: 2022-02-01

## 2022-02-01 NOTE — TELEPHONE ENCOUNTER
Spoke with Pt's Wife sched HFU appt with Lisa Xavier on 3/11/22 at 11:15 am     HFU/SLMO/ATT/AP/CVA due to Embolism/DC1/24    Note from Chart:         Dahlia Jerry will need follow up in 6-8 weeks with neurovascular attending or advance practitioner  He will not require outpatient neurological testing          Spoke with Pt sched HFU appt with Lisa Xavier on 3/11/22 at 11:15 am

## 2022-03-11 ENCOUNTER — OFFICE VISIT (OUTPATIENT)
Dept: NEUROLOGY | Facility: CLINIC | Age: 72
End: 2022-03-11
Payer: MEDICARE

## 2022-03-11 VITALS
HEART RATE: 64 BPM | BODY MASS INDEX: 27.25 KG/M2 | SYSTOLIC BLOOD PRESSURE: 132 MMHG | DIASTOLIC BLOOD PRESSURE: 72 MMHG | WEIGHT: 218 LBS

## 2022-03-11 DIAGNOSIS — E78.5 HYPERLIPIDEMIA, UNSPECIFIED HYPERLIPIDEMIA TYPE: ICD-10-CM

## 2022-03-11 DIAGNOSIS — Z79.4 TYPE 2 DIABETES MELLITUS WITHOUT COMPLICATION, WITH LONG-TERM CURRENT USE OF INSULIN (HCC): ICD-10-CM

## 2022-03-11 DIAGNOSIS — I63.9 CEREBROVASCULAR ACCIDENT (CVA) DUE TO EMBOLISM (HCC): Primary | ICD-10-CM

## 2022-03-11 DIAGNOSIS — G47.00 INSOMNIA: ICD-10-CM

## 2022-03-11 DIAGNOSIS — G47.19 EXCESSIVE DAYTIME SLEEPINESS: ICD-10-CM

## 2022-03-11 DIAGNOSIS — I25.10 CORONARY ARTERY DISEASE INVOLVING NATIVE HEART WITHOUT ANGINA PECTORIS, UNSPECIFIED VESSEL OR LESION TYPE: ICD-10-CM

## 2022-03-11 DIAGNOSIS — E11.9 TYPE 2 DIABETES MELLITUS WITHOUT COMPLICATION, WITH LONG-TERM CURRENT USE OF INSULIN (HCC): ICD-10-CM

## 2022-03-11 DIAGNOSIS — I10 HYPERTENSION, UNSPECIFIED TYPE: ICD-10-CM

## 2022-03-11 PROCEDURE — 99215 OFFICE O/P EST HI 40 MIN: CPT | Performed by: PHYSICIAN ASSISTANT

## 2022-03-11 NOTE — PATIENT INSTRUCTIONS
 Continue with combination of Eliquis 5mg twice a day and atorvastatin for secondary stroke prevention  o Recommend starting aspirin 81mg daily for the narrowing of the vessel (vertebral artery)  - Please discuss with your vascular doctor and cardiologist about their stents and if a full dose aspirin is required   May transition from atorvastatin (lipitor) to crestor    BP goal < 130/80, BP is at goal     LDL goal < 100   Defer to PCP regarding glycemic and blood pressure management    Counseling    Smoking cessation - continue to avoid smoking   Referral to sleep medicine made to evaluate for sleep apnea  Discussed that untreated sleep apnea is risk factor for future strokes  Men with moderate-severe sleep apnea are 3x greater risk compared to men without or mild sleep apnea   Overtime untreated sleep apnea can lead to systemic problems with uncontrolled blood pressure and atrial fibrillation   I advised patient to avoid using NSAIDs for headaches or other pain and to stick to tylenol if needed   Recommend mediterranean diet & regular exercise regimen atleast 4-5 times a week for 20-30 minutes   Educated patient/family regarding medication compliance    Recommend follow up 6 months  I would be happy to see the patient sooner if any new questions/concerns arise  Patient/Guardian was advised to the call the office if they have any questions and concerns in the meantime  Patient/Guardian does understand that if they have any new stroke like symptoms such as facial droop on one side, weakness/paralysis on either side, speech trouble, numbness on one side, balance issues, any vision changes, extreme dizziness or any new headache, to call 9-1-1 immediately or to proceed to the nearest ER immediately

## 2022-03-11 NOTE — PROGRESS NOTES
Review of Systems   Constitutional: Positive for fatigue  Negative for appetite change and fever  HENT: Negative  Negative for hearing loss, tinnitus, trouble swallowing and voice change  Eyes: Negative  Negative for photophobia and pain  Respiratory: Negative  Negative for shortness of breath  Cardiovascular: Negative  Negative for palpitations  Gastrointestinal: Negative  Negative for nausea and vomiting  Endocrine: Negative  Negative for cold intolerance  Genitourinary: Negative  Negative for dysuria, frequency and urgency  Musculoskeletal: Negative for myalgias and neck pain  Balance has changed within recent few months but no big difference     Skin: Negative  Negative for rash  Allergic/Immunologic: Negative  Neurological: Positive for dizziness (if hands over head) and numbness (Left leg due to previous surgery)  Negative for tremors, seizures, syncope, facial asymmetry, speech difficulty, weakness, light-headedness and headaches  Hematological: Bruises/bleeds easily  Psychiatric/Behavioral: Negative  Negative for confusion, hallucinations and sleep disturbance  All other systems reviewed and are negative

## 2022-03-11 NOTE — ASSESSMENT & PLAN NOTE
· Presented with inability to move his right hand that lasted briefly  · MRI with several tiny acute/early subacute lacunar infarcts left frontal lobe  · CTA head/neck with severe plaque stenosis of vertebral artery  · Underwent 14 day holter monitor, found to have Vtach and SVT, no atrial fibrillation  · Cardiology transitioned him to Eliquis 5mg twice a day  · Recommend starting aspirin 81mg daily for the vertebral artery focal stenosis  Also recommend he consults with his vascular physician in regards to his left femoral stent placement to see if he requires full dose aspirin from their standpoint  · Continue statin therapy  · Discussed vascular risk factors  · Avoid NSAIDS  · Smoking cessation  · Referral to sleep medicine placed to rule out underlying KJ

## 2022-03-11 NOTE — ASSESSMENT & PLAN NOTE
Lab Results   Component Value Date    HGBA1C 6 9 (H) 01/23/2022     · Goal <7 0  · Follows with endocrinology

## 2022-03-11 NOTE — PROGRESS NOTES
[address] [facility]  PATIENT:  Evan Mccarty  MRN:  [id number]  :  1950  DATE OF SERVICE:  3/11/2022      Assessment/Plan:        Problem List Items Addressed This Visit        Endocrine    Diabetes mellitus, type 2 ([facility] )       Lab Results   Component Value Date    HGBA1C 6 9 (H) 2022     · Goal <7 0  · Follows with endocrinology            Cardiovascular and Mediastinum    CAD (coronary artery disease)     · Status post LAD stent placement in          Cerebrovascular accident (CVA) due to embolism ([facility] ) - Primary     · Presented with inability to move his right hand that lasted briefly  · MRI with several tiny acute/early subacute lacunar infarcts left frontal lobe  · CTA head/neck with severe plaque stenosis of vertebral artery  · Underwent 14 day holter monitor, found to have Vtach and SVT, no atrial fibrillation  · Cardiology transitioned him to Eliquis 5mg twice a day  · Recommend starting aspirin 81mg daily for the vertebral artery focal stenosis  Also recommend he consults with his vascular physician in regards to his left femoral stent placement to see if he requires full dose aspirin from their standpoint  · Continue statin therapy  · Discussed vascular risk factors  · Avoid NSAIDS  · Smoking cessation  · Referral to sleep medicine placed to rule out underlying KJ  Hypertension     · Goal <130/80            Other    Hyperlipidemia     · Statin therapy            Other Visit Diagnoses     Insomnia        Relevant Orders    Ambulatory Referral to Sleep Medicine    Excessive daytime sleepiness        Relevant Orders    Ambulatory Referral to Sleep Medicine             History of Present Illness:   Evan Mccarty is a right handed 70 y o  male PMH including HTN, HLD, DM, CAD s/p LAD stent, PAD, tobacco use who presents for follow up in regard to his recent stroke  Enedelia Russo presented to [address] [place] [address] on 2022 with symptoms of weakness and inability to use his right hand  He states at the time he was trying to eat ice cream and he couldn't get his hand to grasp the spoon  He denies numbness  BP on arrival 142/93  NIHSS of 0  CTA with severe plaque stenosis at the origin of the right vertebral artery  MRI showed several tiny acute/early subacute lacunar infarcts in the left frontal lobe  He was previously on aspirin 325mg at home prior to event, plavix was added to his home medications, planned for 21 days  It was recommended he discuss with his cardiologist about then transition to plavix 75mg daily  He had a 14 day holter monitor that showed ventricular tachycardia and supraventricular tachycardia  No atrial fibrillation detected  Ashleigh Carbone has been on Eliquis 5mg twice a day and atorvastatin 40mg for secondary stroke prevention  He is compliant and tolerating the medication without significant side effects  No reported new TIA/stroke like symptoms  Sleep:  No problems falling asleep  He has a problem with staying asleep  He has been going to bed around 7pm and waking very early around 2-3am  This has been prior to the stroke  He occasionally snores  His wife denies gasping of air or pauses in breathing  He is napping during the day     Fox Sleepiness Score 13/24    Smoking history - cigars a few a day; quit following the stroke  ETOH - no  Results:     Stroke risk factors were evaluated including:   Lab Results   Component Value Date/Time    CHOLESTEROL 145 01/23/2022 04:19 AM     Lab Results   Component Value Date/Time    TRIG 139 01/23/2022 04:19 AM     Lab Results   Component Value Date/Time    HDL 35 (L) 01/23/2022 04:19 AM     Lab Results   Component Value Date/Time    LDLCALC 82 01/23/2022 04:19 AM       Lab Results   Component Value Date/Time    HGBA1C 6 9 (H) 01/23/2022 04:19 AM    HGBA1C 7 0 (H) 12/20/2021 06:32 AM     Lab Results   Component Value Date/Time     01/23/2022 04:19 AM     (H) 12/20/2021 06:32 AM       Imaging:     Results for orders placed during the hospital encounter of 01/22/22    MRI brain wo contrast    Narrative  MRI BRAIN WITHOUT CONTRAST    INDICATION: Stroke-like symptoms  COMPARISON:   CT/CTA performed yesterday    TECHNIQUE:  Sagittal T1, axial T2, axial FLAIR, axial T1, axial Union and axial diffusion imaging  IMAGE QUALITY:  Diagnostic  FINDINGS:    BRAIN PARENCHYMA: There are few tiny cortical foci of restricted diffusion in the left frontal lobe representing acute/early subacute infarcts  No acute hemorrhage  No mass effect, shift or herniation  Mild chronic microangiopathic change  VENTRICLES:  Normal for the patient's age  SELLA AND PITUITARY GLAND:  Normal     ORBITS:  Bilateral lens replacement  PARANASAL SINUSES:  Normal     VASCULATURE:  Evaluation of the major intracranial vasculature demonstrates appropriate flow voids  CALVARIUM AND SKULL BASE:  Normal     EXTRACRANIAL SOFT TISSUES:  Tornwaldt cyst posterior nasopharynx  Subcutaneous lesion in the posterior soft tissues at the level of C2 is stable compared with CT  Impression  Several tiny acute/early subacute lacunar infarcts in the left frontal lobe  No acute hemorrhage  The study was marked in [facility] for immediate notification  Workstation performed: [id number]    No results found for this or any previous visit  Results for orders placed during the hospital encounter of 01/22/22    CTA head and neck with and without contrast    Narrative  CTA NECK AND BRAIN WITH AND WITHOUT CONTRAST    INDICATION: resolved right arm weakness/ataxia    COMPARISON:   None  TECHNIQUE:  Routine CT imaging of the Brain without contrast   Post contrast imaging was performed after administration of iodinated contrast through the neck and brain  Post contrast axial 0 625 mm images timed to opacify the arterial system  3D rendering was performed on an independent workstation  MIP reconstructions performed   Coronal reconstructions were performed of the noncontrast portion of the brain  Radiation dose length product (DLP) for this visit:  9147 0918 mGy-cm   This examination, like all CT scans performed in the [facility], was performed utilizing techniques to minimize radiation dose exposure, including the use of iterative  reconstruction and automated exposure control  IV Contrast:  85 mL of iohexol (OMNIPAQUE)    IMAGE QUALITY:   Diagnostic    FINDINGS:  NONCONTRAST BRAIN  PARENCHYMA:  No intracranial mass, mass effect or midline shift  No CT signs of acute infarction  No acute parenchymal hemorrhage  VENTRICLES AND EXTRA-AXIAL SPACES:  Normal for the patient's age  VISUALIZED ORBITS AND PARANASAL SINUSES:  Unremarkable  CERVICAL VASCULATURE  AORTIC ARCH AND GREAT VESSELS:  Normal aortic arch and great vessel origins  Normal visualized subclavian vessels  RIGHT VERTEBRAL ARTERY CERVICAL SEGMENT:  Severe stenosis at the origin  The vessel is normal in caliber throughout the neck  LEFT VERTEBRAL ARTERY CERVICAL SEGMENT:  Normal origin  The vessel is normal in caliber throughout the neck  RIGHT EXTRACRANIAL CAROTID SEGMENT:  Moderate atherosclerotic disease of the bifurcation  LEFT EXTRACRANIAL CAROTID SEGMENT:  Moderate atherosclerotic disease of the bifurcation  NASCET criteria was used to determine the degree of internal carotid artery diameter stenosis  INTRACRANIAL VASCULATURE  INTERNAL CAROTID ARTERIES:  Normal enhancement of the intracranial portions of the internal carotid arteries  Normal ophthalmic artery origins  Normal ICA terminus  ANTERIOR CIRCULATION:  Symmetric A1 segments and anterior cerebral arteries with normal enhancement  Normal anterior communicating artery  MIDDLE CEREBRAL ARTERY CIRCULATION:  M1 segment and middle cerebral artery branches demonstrate normal enhancement bilaterally  DISTAL VERTEBRAL ARTERIES:  Normal distal vertebral arteries    Posterior inferior cerebellar artery origins are normal  Normal vertebral basilar junction  BASILAR ARTERY:  Basilar artery is normal in caliber  Normal superior cerebellar arteries  POSTERIOR CEREBRAL ARTERIES: Both posterior cerebral arteries arises from the basilar tip  Both arteries demonstrate normal enhancement  Normal posterior communicating arteries  VENOUS STRUCTURES:  Normal       NON VASCULAR ANATOMY  BONY STRUCTURES:  No acute osseous abnormality  SOFT TISSUES OF THE NECK:  2 8 x 1 2 cm soft tissue nodule /region identified in the posterior subcutaneous region at the level of C2, correlate clinically  THORACIC INLET:  Unremarkable  Impression  1  No evidence of acute internal hemorrhage  2  Severe plaque stenosis at the origin of the right vertebral artery  3   2 8 x 1 2 cm soft tissue nodule /region identified in the posterior subcutaneous region at the level of C2, correlate clinically  Workstation performed: [id number]    No results found for this or any previous visit  No results found for this or any previous visit  No results found for this or any previous visit        Past Medical History:     Past Medical History:   Diagnosis Date    Basal cell carcinoma 11/30/2021    left posterior auticular of the ear     Colon polyp     Coronary artery disease     Diabetes mellitus [facility] )     Hyperlipidemia     Hypertension        Patient Active Problem List   Diagnosis    CAD (coronary artery disease)    History of PTCA    Diabetes mellitus, type 2 ([facility] )    Cerebrovascular [facility] embol[facility]    Hyperlipidemia    Hypertension       Medications:      Current Outpatient Medications   Medication Sig Dispense Refill    amLODIPine (NORVASC) 10 mg tablet Take 10 mg by mouth daily      apixaban (Eliquis) 5 mg 2 (two) times a day      atorvastatin (LIPITOR) 40 mg tablet Take 1 tablet (40 mg total) by mouth every evening 30 tablet 0    FREESTYLE LITE test strip       glimepiride (AMARYL) 2 mg tablet Take 2 mg by mouth      insulin aspart (NovoLOG) 100 units/mL injection Inject 5 Units under the skin 3 (three) times a day with meals      insulin detemir (LEVEMIR FLEXTOUCH) 100 Units/mL injection pen INJECT 21 UNITS AT BEDTIME      Jardiance 10 MG TABS       metFORMIN (GLUCOPHAGE-XR) 750 mg 24 hr tablet Take 750 mg by mouth daily with breakfast      metoprolol succinate (TOPROL-XL) 25 mg 24 hr tablet Take 1 tablet (25 mg total) by mouth daily 30 tablet 0    pantoprazole (PROTONIX) 40 mg tablet Take 1 tablet (40 mg total) by mouth daily 30 tablet 1    ramipril (ALTACE) 10 MG capsule TAKE 1 CAPSULE TWICE A DAY      Silodosin 8 MG CAPS Take 8 mg by mouth daily      aspirin (ECOTRIN LOW STRENGTH) 81 mg EC tablet Take 1 tablet (81 mg total) by mouth daily 30 tablet 0    B-D UF III MINI PEN NEEDLES 31G X 5 MM MISC       clopidogrel (PLAVIX) 75 mg tablet Take 1 tablet (75 mg total) by mouth daily for 20 doses 20 tablet 0    Continuous Blood Gluc  (FreeStyle [address] 14 Day [place]) DIOGO Use 1 each every 14 (fourteen) days      fluticasone (FLONASE) 50 mcg/act nasal spray       loratadine (CLARITIN) 10 mg tablet loratadine 10 mg tablet (Patient not taking: Reported on 3/11/2022)       No current facility-administered medications for this visit          Allergies:    No Known Allergies    Family History:     Family History   Problem Relation Age of Onset    Diabetes Father     Diabetes Sister     Basal cell carcinoma Daughter        Social History:     Social History     Socioeconomic History    Marital status: /Civil Union     Spouse name: Not on file    Number of children: Not on file    Years of education: Not on file    Highest education level: Not on file   Occupational History    Not on file   Tobacco Use    Smoking status: Former Smoker     Quit date:      Years since quittin 1    Smokeless tobacco: Never Used   Vaping Use    Vaping Use: Never used Substance and Sexual Activity    Alcohol use: Never    Drug use: Never    Sexual activity: Not on file   Other Topics Concern    Not on file   Social History Narrative    Not on file     Social Determinants of Health     Financial Resource Strain: Not on file   Food Insecurity: No Food Insecurity    Worried About Running Out of Food in the Last Year: Never true    Owen of Food in the Last Year: Never true   Transportation Needs: No Transportation Needs    Lack of Transportation (Medical): No    Lack of Transportation (Non-Medical): No   Physical Activity: Not on file   Stress: Not on file   Social Connections: Not on file   Intimate Partner Violence: Not on file   Housing Stability: Low Risk     Unable to Pay for Housing in the Last Year: No    Number of Places Lived in the Last Year: 1    Unstable Housing in the Last Year: No         ROS:   Review of Systems   Constitutional: Positive for fatigue  Negative for appetite change and fever  HENT: Negative  Negative for hearing loss, tinnitus, trouble swallowing and voice change  Eyes: Negative  Negative for photophobia and pain  Respiratory: Negative  Negative for shortness of breath  Cardiovascular: Negative  Negative for palpitations  Gastrointestinal: Negative  Negative for nausea and vomiting  Endocrine: Negative  Negative for cold intolerance  Genitourinary: Negative  Negative for dysuria, frequency and urgency  Musculoskeletal: Negative for myalgias and neck pain  Balance has changed within recent few months but no big difference     Skin: Negative  Negative for rash  Allergic/Immunologic: Negative  Neurological: Positive for dizziness (if hands over head) and numbness (Left leg due to previous surgery)  Negative for tremors, seizures, syncope, facial asymmetry, speech difficulty, weakness, light-headedness and headaches  Hematological: Bruises/bleeds easily  Psychiatric/Behavioral: Negative    Negative for confusion, hallucinations and sleep disturbance  All other systems reviewed and are negative        ROS reviewed personally and edited as needed  Objective:   Physical Exam:    /72 (BP Location: Left arm, Patient Position: Sitting, Cuff Size: Standard)   Pulse 64   Wt 98 9 kg (218 lb)   BMI 27 25 kg/m²     Physical Exam  Eyes:      Extraocular Movements: EOM normal       Pupils: Pupils are equal, round, and reactive to light  Neurological:      Mental Status: He is oriented to person, place, and time  Coordination: Finger-Nose-Finger Test abnormal (minimal dysmetria left finger to nose)  Gait: Gait is intact  Deep Tendon Reflexes: Strength normal       Reflex Scores:       Bicep reflexes are 1+ on the right side and 1+ on the left side  Brachioradialis reflexes are 1+ on the right side and 1+ on the left side  Patellar reflexes are 1+ on the right side and 1+ on the left side  Psychiatric:         Speech: Speech normal        Neurologic Exam     Mental Status   Oriented to person, place, and time  Follows 2 step commands  Attention: normal  Concentration: normal    Speech: speech is normal   Level of consciousness: alert  Knowledge: good  Normal comprehension  Cranial Nerves     CN III, IV, VI   Pupils are equal, round, and reactive to light  Extraocular motions are normal    Right pupil: Size: 3 mm  Shape: regular  Reactivity: brisk  Left pupil: Size: 3 mm  Shape: regular  Reactivity: brisk  CN III: no CN III palsy  CN VI: no CN VI palsy  Nystagmus: none   Diplopia: none  Ophthalmoparesis: none  Upgaze: normal  Downgaze: normal  Conjugate gaze: present    CN V   Facial sensation intact  CN VII   Facial expression full, symmetric       CN VIII   Hearing: intact    CN IX, X   Palate: symmetric    CN XI   Right trapezius strength: normal  Left trapezius strength: normal    CN XII   Tongue: not atrophic  Fasciculations: absent  Tongue deviation: none    Motor Exam   Right arm pronator drift: absent  Left arm pronator drift: absent    Strength   Strength 5/5 throughout  Sensory Exam   Light touch normal      Gait, Coordination, and Reflexes     Gait  Gait: normal    Coordination   Finger to nose coordination: abnormal (minimal dysmetria left finger to nose)    Reflexes   Right brachioradialis: 1+  Left brachioradialis: 1+  Right biceps: 1+  Left biceps: 1+  Right patellar: 1+  Left patellar: 1+  Right ankle clonus: absent  Left ankle clonus: absent        I have spent 45 minutes with Patient  today in which greater than 50% of this time was spent in counseling/coordination of care regarding Diagnostic results, Prognosis, Risks and benefits of tx options, Intructions for management, Patient and family education, Importance of tx compliance, Risk factor reductions, Impressions and Plan of care as above

## 2022-04-25 ENCOUNTER — PATIENT OUTREACH (OUTPATIENT)
Dept: CASE MANAGEMENT | Facility: OTHER | Age: 72
End: 2022-04-25

## 2022-06-13 ENCOUNTER — OFFICE VISIT (OUTPATIENT)
Dept: DERMATOLOGY | Facility: CLINIC | Age: 72
End: 2022-06-13
Payer: MEDICARE

## 2022-06-13 VITALS — WEIGHT: 217 LBS | BODY MASS INDEX: 26.98 KG/M2 | HEIGHT: 75 IN

## 2022-06-13 DIAGNOSIS — Z85.828 HISTORY OF SKIN CANCER: ICD-10-CM

## 2022-06-13 DIAGNOSIS — Z13.89 SCREENING FOR SKIN CONDITION: ICD-10-CM

## 2022-06-13 DIAGNOSIS — L82.1 SEBORRHEIC KERATOSIS: Primary | ICD-10-CM

## 2022-06-13 PROCEDURE — 99213 OFFICE O/P EST LOW 20 MIN: CPT | Performed by: DERMATOLOGY

## 2022-06-13 NOTE — PROGRESS NOTES
[facility] DERMATOLOGY  [address] [place] [id number]  [phone]  [phone]     MRN: [id number] : 1950  Encounter: [id number]  Patient Information: Dawn Wooten  Chief complaint: 6 month check up    History of present illness:  57-year-old male presents for overall skin check previous history of nonmelanoma skin cancer and Mohs surgery no specific concerns noted  Past Medical History:   Diagnosis Date    Basal cell carcinoma 2021    left posterior auticular of the ear     Colon polyp     Coronary artery disease     Diabetes mellitus ([address] )     Hyperlipidemia     Hypertension      Past Surgical History:   Procedure Laterality Date    CATARACT EXTRACTION      COLONOSCOPY      CORONARY ANGIOPLASTY WITH STENT PLACEMENT  2003    CYST REMOVAL  2016    ILIAC ARTERY STENT Bilateral 2016    MOHS SURGERY Left 2022    Left posterior auricular of the ear     Social History   Social History     Substance and Sexual Activity   Alcohol Use Never     Social History     Substance and Sexual Activity   Drug Use Never     Social History     Tobacco Use   Smoking Status Former Smoker    Quit date:     Years since quittin 4   Smokeless Tobacco Never Used     Family History   Problem Relation Age of Onset    Diabetes Father     Diabetes Sister     Basal cell carcinoma Daughter      Meds/Allergies   No Known Allergies    Meds:  Prior to Admission medications    Medication Sig Start Date End Date Taking?  Authorizing Provider   amLODIPine (NORVASC) 10 mg tablet Take 10 mg by mouth daily   Yes Historical Provider, MD   apixaban (ELIQUIS) 5 mg 2 (two) times a day 22  Yes Historical Provider, MD JUNG UF III MINI PEN NEEDLES 31G X 5 MM MISC  18  Yes Historical Provider, MD   Continuous Blood Gluc  ([employer] Kathya 14 Day [place]) DIOGO Use 1 each every 14 (fourteen) days 20  Yes Historical Provider, MD   fluticasone [facility]) 50 mcg/act nasal spray  10/17/20  Yes Historical Provider, MD   FREESTYLE LITE test strip  12/3/18  Yes Historical Provider, MD   glimepiride (AMARYL) 2 mg tablet Take 2 mg by mouth 3/12/18  Yes Historical Provider, MD   insulin aspart (NovoLOG) 100 units/mL injection Inject 5 Units under the skin 3 (three) times a day with meals   Yes Historical Provider, MD   insulin detemir (LEVEMIR FLEXTOUCH) 100 Units/mL injection pen INJECT 21 UNITS AT BEDTIME 9/11/18  Yes Historical Provider, MD   Jardiance 10 MG TABS  11/16/21  Yes Historical Provider, MD   metFORMIN (GLUCOPHAGE-XR) 750 mg 24 hr tablet Take 750 mg by mouth daily with breakfast   Yes Historical Provider, MD   pantoprazole (PROTONIX) 40 mg tablet Take 1 tablet (40 mg total) by mouth daily 11/12/20  Yes Luda Whitney PA-C   ramipril (ALTACE) 10 MG capsule TAKE 1 CAPSULE TWICE A DAY 11/5/18  Yes Historical Provider, MD   aspirin (ECOTRIN LOW STRENGTH) 81 mg EC tablet Take 1 tablet (81 mg total) by mouth daily 1/24/22 2/23/22  Rommel Meadows MD   atorvastatin (LIPITOR) 40 mg tablet Take 1 tablet (40 mg total) by mouth every evening 1/27/22 3/11/22  Rommel Meadows MD   clopidogrel (PLAVIX) 75 mg tablet Take 1 tablet (75 mg total) by mouth daily for 20 doses 1/24/22 2/13/22  Rommel Meadows MD   loratadine (CLARITIN) 10 mg tablet loratadine 10 mg tablet  Patient not taking: No sig reported    Historical Provider, MD   metoprolol succinate (TOPROL-XL) 25 mg 24 hr tablet Take 1 tablet (25 mg total) by mouth daily 1/24/22 3/11/22  Rommel Meadows MD   Silodosin 8 MG CAPS Take 8 mg by mouth daily 12/9/20 3/11/22  Historical Provider, MD       Subjective:     Review of Systems:    General: negative for - chills, fatigue, fever,  weight gain or weight loss  Psychological: negative for - anxiety, behavioral disorder, concentration difficulties, decreased libido, depression, irritability, memory difficulties, mood swings, sleep disturbances or suicidal ideation  ENT: negative for - hearing difficulties , nasal congestion, nasal discharge, oral lesions, sinus pain, sneezing, sore throat  Allergy and Immunology: negative for - hives, insect bite sensitivity,  Hematological and Lymphatic: negative for - bleeding problems, blood clots,bruising, swollen lymph nodes  Endocrine: negative for - hair pattern changes, hot flashes, malaise/lethargy, mood swings, palpitations, polydipsia/polyuria, skin changes, temperature intolerance or unexpected weight change  Respiratory: negative for - cough, hemoptysis, orthopnea, shortness of breath, or wheezing  Cardiovascular: negative for - chest pain, dyspnea on exertion, edema,  Gastrointestinal: negative for - abdominal pain, nausea/vomiting  Genito-Urinary: negative for - dysuria, incontinence, irregular/heavy menses or urinary frequency/urgency  Musculoskeletal: negative for - gait disturbance, joint pain, joint stiffness, joint swelling, muscle pain, muscular weakness  Dermatological:  As in HPI  Neurological: negative for confusion, dizziness, headaches, impaired coordination/balance, memory loss, numbness/tingling, seizures, speech problems, tremors or weakness       Objective:   Ht 6' 3" (1 905 m)   Wt 98 4 kg (217 lb)   BMI 27 12 kg/m²     Physical Exam:    General Appearance:    Alert, cooperative, no distress   Head:    Normocephalic, without obvious abnormality, atraumatic           Skin:   A full skin exam was performed including scalp, head scalp, eyes, ears, nose, lips, neck, chest, axilla, abdomen, back, buttocks, bilateral upper extremities, bilateral lower extremities, hands, feet, fingers, toes, fingernails, and toenails normal keratotic papules greasy stuck on appearance nothing else remarkable noted on complete exam previous sites skin cancer well healed without recurrence     Assessment:     1  Seborrheic keratosis     2  Screening for skin condition     3   History of skin cancer           Plan:   Seborrheic keratosis patient reassured these are normal growths we acquire with age no treatment needed  History of skin cancer in no recurrence nothing else atypical sunblock recommended follow-up in 6 months  Screening for dermatologic disorders nothing else of concern noted on complete exam follow-up in 6 months    Noelle Verma MD  6/13/2022,8:07 AM    Portions of the record may have been created with voice recognition software   Occasional wrong word or "sound a like" substitutions may have occurred due to the inherent limitations of voice recognition software   Read the chart carefully and recognize, using context, where substitutions have occurred

## 2022-07-27 ENCOUNTER — OFFICE VISIT (OUTPATIENT)
Dept: DERMATOLOGY | Facility: CLINIC | Age: 72
End: 2022-07-27
Payer: MEDICARE

## 2022-07-27 VITALS — HEIGHT: 75 IN | WEIGHT: 220 LBS | BODY MASS INDEX: 27.35 KG/M2

## 2022-07-27 DIAGNOSIS — L82.1 SEBORRHEIC KERATOSIS: Primary | ICD-10-CM

## 2022-07-27 PROCEDURE — 99212 OFFICE O/P EST SF 10 MIN: CPT | Performed by: DERMATOLOGY

## 2022-07-27 NOTE — PROGRESS NOTES
[address]  [address] [facility] [address]  [phone]  [phone]     MRN: [id number] : 1950  Encounter: [id number]  Patient Information: Fazal Padgett    Subjective:     27-year-old male presents for concerns regarding a lesion on his right temple that his wife was concerned about  Patient was just here in  for overall checkup     Objective:   Ht 6' 3" (1 905 m)   Wt 99 8 kg (220 lb)   BMI 27 50 kg/m²     Physical Exam:    General Appearance:    Alert, cooperative, no distress   Skin:  Normal keratotic papules greasy stuck on appearance nothing else remarkable noted exam     Assessment:     1  Seborrheic keratosis           Plan:   Seborrheic Keratosis  Patient reasurred these are normal growths we acquire with age no treatment needed  Prior to Admission medications    Medication Sig Start Date End Date Taking?  Authorizing Provider   amLODIPine (NORVASC) 10 mg tablet Take 10 mg by mouth daily   Yes Historical Provider, MD   apixaban (ELIQUIS) 5 mg 2 (two) times a day 22  Yes Historical Provider, MD JUNG UF III MINI PEN NEEDLES 31G X 5 MM MISC  18  Yes Historical Provider, MD   Continuous Blood Gluc  (FreeStyle Kathya 14 Day [place]) DIOGO Use 1 each every 14 (fourteen) days 20  Yes Historical Provider, MD   fluticasone Buzz Jeffery) 50 mcg/act nasal spray  10/17/20  Yes Historical Provider, MD   FREESTYLE LITE test strip  12/3/18  Yes Historical Provider, MD   glimepiride (AMARYL) 2 mg tablet Take 2 mg by mouth 3/12/18  Yes Historical Provider, MD   insulin aspart (NovoLOG) 100 units/mL injection Inject 5 Units under the skin 3 (three) times a day with meals   Yes Historical Provider, MD   insulin detemir (LEVEMIR FLEXTOUCH) 100 Units/mL injection pen INJECT 21 UNITS AT BEDTIME 18  Yes Historical Provider, MD   Jardiance 10 MG TABS  21  Yes Historical Provider, MD   loratadine (CLARITIN) 10 mg tablet loratadine 10 mg tablet   Yes Historical Provider, MD   metFORMIN (GLUCOPHAGE-XR) 750 mg 24 hr tablet Take 750 mg by mouth daily with breakfast   Yes Historical Provider, MD   pantoprazole (PROTONIX) 40 mg tablet Take 1 tablet (40 mg total) by mouth daily 11/12/20  Yes Ary Dockery PA-C   ramipril (ALTACE) 10 MG capsule TAKE 1 CAPSULE TWICE A DAY 11/5/18  Yes Historical Provider, MD   aspirin (ECOTRIN LOW STRENGTH) 81 mg EC tablet Take 1 tablet (81 mg total) by mouth daily 1/24/22 2/23/22  Patrica Crawley MD   atorvastatin (LIPITOR) 40 mg tablet Take 1 tablet (40 mg total) by mouth every evening 1/27/22 3/11/22  Patrica Crawley MD   clopidogrel (PLAVIX) 75 mg tablet Take 1 tablet (75 mg total) by mouth daily for 20 doses 1/24/22 2/13/22  Patrica Crawley MD   metoprolol succinate (TOPROL-XL) 25 mg 24 hr tablet Take 1 tablet (25 mg total) by mouth daily 1/24/22 3/11/22  Patrica Crawley MD   Silodosin 8 MG CAPS Take 8 mg by mouth daily 12/9/20 3/11/22  Historical Provider, MD     No Known Allergies    Yelitza Horn MD  7/27/2022,12:04 PM    Portions of the record may have been created with voice recognition software   Occasional wrong word or "sound a like" substitutions may have occurred due to the inherent limitations of voice recognition software   Read the chart carefully and recognize, using context, where substitutions have occurred

## 2022-07-27 NOTE — PATIENT INSTRUCTIONS
SEBORRHEIC KERATOSIS; NON-INFLAMED          Seborrheic Keratosis  A seborrheic keratosis is a harmless warty spot that appears during adult life as a common sign of skin aging  Seborrheic keratoses can arise on any area of skin, covered or uncovered, with the usual exception of the palms and soles  They do not arise from mucous membranes  Seborrheic keratoses can have highly variable appearance  Seborrheic keratoses are extremely common  It has been estimated that over 90% of adults over the age of 61 years have one or more of them  They occur in males and females of all races, typically beginning to erupt in the 35s or 45s  They are uncommon under the age of 21 years  The precise cause of seborrhoeic keratoses is not known  Seborrhoeic keratoses are considered degenerative in nature  As time goes by, seborrheic keratoses tend to become more numerous  Some people inherit a tendency to develop a very large number of them; some people may have hundreds of them  The name "seborrheic keratosis" is misleading, because these lesions are not limited to a seborrhoeic distribution (scalp, mid-face, chest, upper back), nor are they formed from sebaceous glands, nor are they associated with sebum -- which is greasy  Seborrheic keratosis may also be called "SK," "Seb K," "basal cell papilloma," "senile wart," or "barnacle "      Researchers have noted:  Eruptive seborrhoeic keratoses can follow sunburn or dermatitis  Skin friction may be the reason they appear in body folds  Viral cause (e g , human papillomavirus) seems unlikely  Stable and clonal mutations or activation of FRFR3, PIK3CA, CHICHI, AKT1 and EGFR genes are found in seborrhoeic keratoses  Seborrhoeic keratosis can arise from solar lentigo  FRFR3 mutations also arise in solar lentigines   These mutations are associated with increased age and location on the head and neck, suggesting a role of ultraviolet radiation in these lesions  Seborrheic keratoses do not harbour tumour suppressor gene mutations  Epidermal growth factor receptor inhibitors, which are used to treat some cancers, often result in an increase in verrucal (warty) keratoses  There is no easy way to remove multiple lesions on a single occasion  Unless a specific lesion is "inflamed" and is causing pain or stinging/burning or is bleeding, most insurance companies do not authorize treatment

## 2022-09-01 ENCOUNTER — TELEPHONE (OUTPATIENT)
Dept: NEUROLOGY | Facility: CLINIC | Age: 72
End: 2022-09-01

## 2022-09-01 NOTE — TELEPHONE ENCOUNTER
Attempted to call pt for reminder of appt with Ayo Coulter on 09/07/22, no answer  Left a vm to call back when available

## 2022-09-14 ENCOUNTER — TELEPHONE (OUTPATIENT)
Dept: NEUROLOGY | Facility: CLINIC | Age: 72
End: 2022-09-14

## 2022-09-14 ENCOUNTER — OFFICE VISIT (OUTPATIENT)
Dept: NEUROLOGY | Facility: CLINIC | Age: 72
End: 2022-09-14

## 2022-09-14 VITALS
DIASTOLIC BLOOD PRESSURE: 60 MMHG | BODY MASS INDEX: 25.84 KG/M2 | HEART RATE: 60 BPM | WEIGHT: 206.7 LBS | SYSTOLIC BLOOD PRESSURE: 102 MMHG | RESPIRATION RATE: 14 BRPM | TEMPERATURE: 97.4 F

## 2022-09-14 DIAGNOSIS — Z86.73 HISTORY OF STROKE: Primary | ICD-10-CM

## 2022-09-14 DIAGNOSIS — I65.09 VERTEBRAL ARTERY STENOSIS: ICD-10-CM

## 2022-09-14 NOTE — PATIENT INSTRUCTIONS
History of stroke  Pt denies any symptoms to suggest new stroke  Pt should continue Eliquis and atorvastatin for secondary stroke prevention  Will discuss with Dr Mik Dunaway re the need for ASA 81mg daily  Pt was encouraged to get regular exercise and follow a diabetic diet  If pt has any new stroke-like symptoms including sudden painless loss of vision or double vision, difficulty speaking or swallowing, vertigo / room spinning that does not quickly resolve, or weakness / numbness affecting 1 side of the face or body he should proceed by ambulance to the nearest emergency room immediately  I have spent 45 minutes with Patient  today in which greater than 50% of this time was spent in counseling/coordination of care regarding Diagnostic results, Prognosis, Risks and benefits of tx options, Intructions for management, Patient and family education, Importance of tx compliance, Risk factor reductions and Impressions  Pt will follow-up in 12 months  Vertebral artery stenosis  Luis Smith had recommended that pt resume Aspirin 81mg daily and follow-up with Vascular Surgery  He does have an appt scheduled in January and he has not been taking Aspirin  Will reach out to Dr Mik Dunaway to determine if he needs to resume Aspirin

## 2022-09-14 NOTE — ASSESSMENT & PLAN NOTE
 Pt denies any symptoms to suggest new stroke   Pt should continue Eliquis and atorvastatin for secondary stroke prevention  Will discuss with Dr Brenda Sepulveda re the need for ASA 81mg daily   Pt was encouraged to get regular exercise and follow a diabetic diet   If pt has any new stroke-like symptoms including sudden painless loss of vision or double vision, difficulty speaking or swallowing, vertigo / room spinning that does not quickly resolve, or weakness / numbness affecting 1 side of the face or body he should proceed by ambulance to the nearest emergency room immediately   I have spent 45 minutes with Patient  today in which greater than 50% of this time was spent in counseling/coordination of care regarding Diagnostic results, Prognosis, Risks and benefits of tx options, Intructions for management, Patient and family education, Importance of tx compliance, Risk factor reductions and Impressions   Pt will follow-up in 12 months

## 2022-09-14 NOTE — TELEPHONE ENCOUNTER
----- Message from Marlen Hernández PA-C sent at 9/14/2022  2:59 PM EDT -----  Regarding: Aspirin  Called Dr Sen Pitts to determine if pt needs to be on Aspirin and at what dosage  He has a focal vertebral artery stenosis and a femoral stent  He is currently on Eliquis  Left a message for a call back

## 2022-09-14 NOTE — TELEPHONE ENCOUNTER
Adri rose 3:31 PM from [facility] office  Requesting return call [phone]

## 2022-09-14 NOTE — PROGRESS NOTES
Patient ID: Perico Ba is a 67 y o  male  Assessment/Plan:    History of stroke   Pt denies any symptoms to suggest new stroke   Pt should continue Eliquis and atorvastatin for secondary stroke prevention  Will discuss with Dr Suraj puentes the need for ASA 81mg daily   Pt was encouraged to get regular exercise and follow a diabetic diet   If pt has any new stroke-like symptoms including sudden painless loss of vision or double vision, difficulty speaking or swallowing, vertigo / room spinning that does not quickly resolve, or weakness / numbness affecting 1 side of the face or body he should proceed by ambulance to the nearest emergency room immediately   I have spent 45 minutes with Patient  today in which greater than 50% of this time was spent in counseling/coordination of care regarding Diagnostic results, Prognosis, Risks and benefits of tx options, Intructions for management, Patient and family education, Importance of tx compliance, Risk factor reductions and Impressions   Pt will follow-up in 12 months  Vertebral artery stenosis  · Liz Ash had recommended that pt resume Aspirin 81mg daily and follow-up with Vascular Surgery  · He does have an appt scheduled in January and he has not been taking Aspirin  · Will reach out to Dr Suraj Camejo to determine if he needs to resume Aspirin  Problem List Items Addressed This Visit        Cardiovascular and Mediastinum    Vertebral artery stenosis     · Liz Ash had recommended that pt resume Aspirin 81mg daily and follow-up with Vascular Surgery  · He does have an appt scheduled in January and he has not been taking Aspirin  · Will reach out to Dr Suraj Camejo to determine if he needs to resume Aspirin  Other    History of stroke - Primary      Pt denies any symptoms to suggest new stroke   Pt should continue Eliquis and atorvastatin for secondary stroke prevention   Will discuss with Dr Suraj puentes the need for ASA 81mg daily    Pt was encouraged to get regular exercise and follow a diabetic diet   If pt has any new stroke-like symptoms including sudden painless loss of vision or double vision, difficulty speaking or swallowing, vertigo / room spinning that does not quickly resolve, or weakness / numbness affecting 1 side of the face or body he should proceed by ambulance to the nearest emergency room immediately   I have spent 45 minutes with Patient  today in which greater than 50% of this time was spent in counseling/coordination of care regarding Diagnostic results, Prognosis, Risks and benefits of tx options, Intructions for management, Patient and family education, Importance of tx compliance, Risk factor reductions and Impressions   Pt will follow-up in 12 months  Subjective:    CARINA    Noemi Encarnacion is a right handed 67 y o  male, with HTN, HLD, DM, CAD s/p LAD stent, PAD and history tobacco use, who follows in the office due to a history of stroke  He was taking ASA and Plavix with an eventual plan to reduce to a single antiplatleet agent  He was then started on Eliquis and ASA and Plavix were discontinued  He was then seen by Lauren Morris PA-C and she advised resuming ASA 81mg daily due to focal vertebral artery stenosis and a femoral stent  He reports that he never restarted the ASA  He reports lightheadedness that occurs when he has been active for an extended period of time  His wife believes he could be more active  He had plans to join a gym but has not done so  He does follow a diabetic diet  CTA with severe plaque stenosis at the origin of the right vertebral artery  MRI showed several tiny acute/early subacute lacunar infarcts in the left frontal lobe       The following portions of the patient's history were reviewed and updated as appropriate: allergies, current medications, past family history, past medical history, past social history, past surgical history and problem list  Objective:    Blood pressure 102/60, pulse 60, temperature (!) 97 4 °F (36 3 °C), temperature source Temporal, resp  rate 14, weight 93 8 kg (206 lb 11 2 oz)  Physical Exam  Vitals reviewed  Eyes:      General: Lids are normal       Extraocular Movements: Extraocular movements intact  Pupils: Pupils are equal, round, and reactive to light  Neurological:      Coordination: Coordination is intact  Deep Tendon Reflexes: Strength normal and reflexes are normal and symmetric  Psychiatric:         Speech: Speech normal          Neurological Exam  Mental Status   Oriented to person, place, time and situation  Recent and remote memory are intact  Speech is normal  Language is fluent with no aphasia  Attention and concentration are normal  Fund of knowledge is appropriate for level of education  Apraxia absent  Cranial Nerves  CN II: Visual acuity is normal  Visual fields full to confrontation  CN III, IV, VI: Extraocular movements intact bilaterally  Normal lids and orbits bilaterally  Pupils equal round and reactive to light bilaterally  CN V: Facial sensation is normal   CN VII: Full and symmetric facial movement  CN VIII: Hearing is normal   CN IX, X: Palate elevates symmetrically  Normal gag reflex  CN XI: Shoulder shrug strength is normal   CN XII: Tongue midline without atrophy or fasciculations  Motor   Strength is 5/5 throughout all four extremities  Sensory  Sensation is intact to light touch, pinprick, vibration and proprioception in all four extremities  Reflexes  Deep tendon reflexes are 2+ and symmetric in all four extremities  Coordination    Finger-to-nose, rapid alternating movements and heel-to-shin normal bilaterally without dysmetria  Gait  Normal casual, toe, heel and tandem gait  ROS:    Review of Systems   Constitutional: Negative  Negative for appetite change and fever  HENT: Negative    Negative for hearing loss, tinnitus, trouble swallowing and voice change  Eyes: Negative  Negative for photophobia and pain  Respiratory: Negative  Negative for shortness of breath  Cardiovascular: Negative  Negative for palpitations  Gastrointestinal: Negative  Negative for nausea and vomiting  Endocrine: Negative  Negative for cold intolerance  Genitourinary: Negative  Negative for dysuria, frequency and urgency  Musculoskeletal: Negative  Negative for myalgias and neck pain  Skin: Negative  Negative for rash  Neurological: Negative  Negative for dizziness, tremors, seizures, syncope, facial asymmetry, speech difficulty, weakness, light-headedness, numbness and headaches  Hematological: Negative  Does not bruise/bleed easily  Psychiatric/Behavioral: Negative  Negative for confusion, hallucinations and sleep disturbance  Review of systems personally reviewed

## 2022-09-14 NOTE — ASSESSMENT & PLAN NOTE
· Gertrudis Lovett had recommended that pt resume Aspirin 81mg daily and follow-up with Vascular Surgery  · He does have an appt scheduled in January and he has not been taking Aspirin  · Will reach out to Dr Masnfield to determine if he needs to resume Aspirin

## 2022-09-15 DIAGNOSIS — I65.09 VERTEBRAL ARTERY STENOSIS: Primary | ICD-10-CM

## 2022-09-15 RX ORDER — CLOPIDOGREL BISULFATE 75 MG/1
75 TABLET ORAL DAILY
Qty: 90 TABLET | Refills: 3 | Status: SHIPPED | OUTPATIENT
Start: 2022-09-15 | End: 2023-08-23

## 2022-09-15 NOTE — TELEPHONE ENCOUNTER
Spoke with Denisha Choudhury at Dr Jolly Cuello office  Pt should be taking ASA, Plavix and Eliquis  Called pt and relayed this information  Rx for Plavix was sent in for pt

## 2022-12-19 ENCOUNTER — OFFICE VISIT (OUTPATIENT)
Dept: DERMATOLOGY | Facility: CLINIC | Age: 72
End: 2022-12-19

## 2022-12-19 VITALS — WEIGHT: 200 LBS | BODY MASS INDEX: 24.87 KG/M2 | HEIGHT: 75 IN

## 2022-12-19 DIAGNOSIS — L82.1 SEBORRHEIC KERATOSIS: Primary | ICD-10-CM

## 2022-12-19 DIAGNOSIS — Z13.89 SCREENING FOR SKIN CONDITION: ICD-10-CM

## 2022-12-19 DIAGNOSIS — Z85.828 HISTORY OF SKIN CANCER: ICD-10-CM

## 2022-12-19 NOTE — PROGRESS NOTES
[address] [facility]  [address] [place] [address]   [phone]  [phone]     MRN: [id number] : 1950  Encounter: [id number]  Patient Information: Suresh Liriano  Chief complaint:6 month check up    History of present illness: 80-year-old male with previous history nonmelanoma skin cancer presents for overall checkup  Again concerned regarding numerous growths on his skin  Past Medical History:   Diagnosis Date   • Basal cell carcinoma 2021    left posterior auticular of the ear    • Colon polyp    • Coronary artery disease    • Diabetes mellitus ([address] )    • Hyperlipidemia    • Hypertension      Past Surgical History:   Procedure Laterality Date   • CATARACT EXTRACTION     • COLONOSCOPY     • CORONARY ANGIOPLASTY WITH STENT PLACEMENT     • CYST REMOVAL     • ILIAC ARTERY STENT Bilateral 2016   • MOHS SURGERY Left 2022    Left posterior auricular of the ear     Social History   Social History     Substance and Sexual Activity   Alcohol Use Never     Social History     Substance and Sexual Activity   Drug Use Never     Social History     Tobacco Use   Smoking Status Former   • Types: Cigarettes   • Quit date:    • Years since quittin 9   Smokeless Tobacco Never     Family History   Problem Relation Age of Onset   • Diabetes Father    • Diabetes Sister    • Basal cell carcinoma Daughter      Meds/Allergies   No Known Allergies    Meds:  Prior to Admission medications    Medication Sig Start Date End Date Taking?  Authorizing Provider   amLODIPine (NORVASC) 10 mg tablet Take 10 mg by mouth daily   Yes Historical Provider, MD   apixaban (ELIQUIS) 5 mg 2 (two) times a day 22  Yes Historical Provider, MD JUNG UF III MINI PEN NEEDLES 31G X 5 MM MISC  18  Yes Historical Provider, MD   clopidogrel (Plavix) 75 mg tablet Take 1 tablet (75 mg total) by mouth daily 9/15/22  Yes Regi Varma PA-C   Continuous Blood Gluc  (FreeStyle Kathya 14 Day [place]) DIOGO Use 1 each every 14 (fourteen) days 8/13/20  Yes Historical Provider, MD   fluticasone (FLONASE) 50 mcg/act nasal spray  10/17/20  Yes Historical Provider, MD   glimepiride (AMARYL) 2 mg tablet Take 2 mg by mouth 3/12/18  Yes Historical Provider, MD   insulin aspart (NovoLOG) 100 units/mL injection Inject 5 Units under the skin 3 (three) times a day with meals   Yes Historical Provider, MD   insulin detemir (LEVEMIR FLEXTOUCH) 100 Units/mL injection pen INJECT 21 UNITS AT BEDTIME 9/11/18  Yes Historical Provider, MD   Jardiance 10 MG TABS  11/16/21  Yes Historical Provider, MD   metFORMIN (GLUCOPHAGE-XR) 750 mg 24 hr tablet Take 750 mg by mouth daily with breakfast   Yes Historical Provider, MD   metoprolol succinate (TOPROL-XL) 25 mg 24 hr tablet Take 1 tablet (25 mg total) by mouth daily 1/24/22 12/19/22 Yes Maria Guadalupe Ny MD   pantoprazole (PROTONIX) 40 mg tablet Take 1 tablet (40 mg total) by mouth daily 11/12/20  Yes Augustus Frances PA-C   ramipril (ALTACE) 10 MG capsule TAKE 1 CAPSULE TWICE A DAY 11/5/18  Yes Historical Provider, MD   Silodosin 8 MG CAPS Take 8 mg by mouth daily 12/9/20 12/19/22 Yes Historical Provider, MD   aspirin (ECOTRIN LOW STRENGTH) 81 mg EC tablet Take 1 tablet (81 mg total) by mouth daily 1/24/22 2/23/22  Maria Guadalupe Ny MD   atorvastatin (LIPITOR) 40 mg tablet Take 1 tablet (40 mg total) by mouth every evening  Patient not taking: Reported on 12/19/2022 1/27/22 3/11/22  Maria Guadalupe Ny MD   FREESTYLE LITE test strip  12/3/18   Historical Provider, MD   loratadine (CLARITIN) 10 mg tablet loratadine 10 mg tablet  Patient not taking: Reported on 12/19/2022    Historical Provider, MD       Subjective:     Review of Systems:    General: negative for - chills, fatigue, fever,  weight gain or weight loss  Psychological: negative for - anxiety, behavioral disorder, concentration difficulties, decreased libido, depression, irritability, memory difficulties, mood swings, sleep disturbances or suicidal ideation  ENT: negative for - hearing difficulties , nasal congestion, nasal discharge, oral lesions, sinus pain, sneezing, sore throat  Allergy and Immunology: negative for - hives, insect bite sensitivity,  Hematological and Lymphatic: negative for - bleeding problems, blood clots,bruising, swollen lymph nodes  Endocrine: negative for - hair pattern changes, hot flashes, malaise/lethargy, mood swings, palpitations, polydipsia/polyuria, skin changes, temperature intolerance or unexpected weight change  Respiratory: negative for - cough, hemoptysis, orthopnea, shortness of breath, or wheezing  Cardiovascular: negative for - chest pain, dyspnea on exertion, edema,  Gastrointestinal: negative for - abdominal pain, nausea/vomiting  Genito-Urinary: negative for - dysuria, incontinence, irregular/heavy menses or urinary frequency/urgency  Musculoskeletal: negative for - gait disturbance, joint pain, joint stiffness, joint swelling, muscle pain, muscular weakness  Dermatological:  As in HPI  Neurological: negative for confusion, dizziness, headaches, impaired coordination/balance, memory loss, numbness/tingling, seizures, speech problems, tremors or weakness       Objective:   Ht 6' 3" (1 905 m)   Wt 90 7 kg (200 lb)   BMI 25 00 kg/m²     Physical Exam:    General Appearance:    Alert, cooperative, no distress   Head:    Normocephalic, without obvious abnormality, atraumatic           Skin:   A full skin exam was performed including scalp, head scalp, eyes, ears, nose, lips, neck, chest, axilla, abdomen, back, buttocks, bilateral upper extremities, bilateral lower extremities, hands, feet, fingers, toes, fingernails, and toenails normal keratotic papules greasy stuck on appearance site of skin cancers well-healed without recurrence nothing else atypical noted on complete exam     Assessment:     1  Seborrheic keratosis        2  History of skin cancer        3  Screening for skin condition              Plan:   Seborrheic keratosis patient reassured these are normal growths we acquire with age no treatment needed  History of skin cancer in no recurrence nothing else atypical sunblock recommended follow-up in 6 months  Screening for dermatologic disorders nothing else of concern noted on complete exam follow-up in 6 months    Katherine Hazel MD  12/19/2022,8:42 AM    Portions of the record may have been created with voice recognition software   Occasional wrong word or "sound a like" substitutions may have occurred due to the inherent limitations of voice recognition software   Read the chart carefully and recognize, using context, where substitutions have occurred

## 2022-12-19 NOTE — PROGRESS NOTES
[facility]     Patient Name: Michell Wagner  Encounter Date: December 19, 2022     Have you been cared for by a [place] [id number] Dermatologist in the last 3 years and, if so, which description applies to you? Yes  I have been here within the last 3 years, and my medical history has NOT changed since that time  I am MALE/not capable of bearing children  REVIEW OF SYSTEMS:  Have you recently had or currently have any of the following? · No changes in my recent health  PAST MEDICAL HISTORY:  Have you personally ever had or currently have any of the following? If "YES," then please provide more detail  · No changes in my medical history  FAMILY HISTORY:  Any "first degree relatives" (parent, brother, sister, or child) with the following? • No changes in my family's known health  PATIENT EXPERIENCE:    • Do you want the Dermatologist to perform a COMPLETE skin exam today including a clinical examination under the "bra and underwear" areas? Yes  • If necessary, do we have your permission to call and leave a detailed message on your Preferred Phone number that includes your specific medical information?   Yes      No Known Allergies   Current Outpatient Medications:   •  amLODIPine (NORVASC) 10 mg tablet, Take 10 mg by mouth daily, Disp: , Rfl:   •  apixaban (ELIQUIS) 5 mg, 2 (two) times a day, Disp: , Rfl:   •  B-D UF III MINI PEN NEEDLES 31G X 5 MM MISC, , Disp: , Rfl:   •  clopidogrel (Plavix) 75 mg tablet, Take 1 tablet (75 mg total) by mouth daily, Disp: 90 tablet, Rfl: 3  •  Continuous Blood Gluc  (FreeStyle Kathya 14 Day [place]) DIOGO, Use 1 each every 14 (fourteen) days, Disp: , Rfl:   •  fluticasone (FLONASE) 50 mcg/act nasal spray, , Disp: , Rfl:   •  glimepiride (AMARYL) 2 mg tablet, Take 2 mg by mouth, Disp: , Rfl:   •  insulin aspart (NovoLOG) 100 units/mL injection, Inject 5 Units under the skin 3 (three) times a day with meals, Disp: , Rfl:   •  insulin detemir (LEVEMIR FLEXTOUCH) 100 Units/mL injection pen, INJECT 21 UNITS AT BEDTIME, Disp: , Rfl:   •  Jardiance 10 MG TABS, , Disp: , Rfl:   •  metFORMIN (GLUCOPHAGE-XR) 750 mg 24 hr tablet, Take 750 mg by mouth daily with breakfast, Disp: , Rfl:   •  metoprolol succinate (TOPROL-XL) 25 mg 24 hr tablet, Take 1 tablet (25 mg total) by mouth daily, Disp: 30 tablet, Rfl: 0  •  pantoprazole (PROTONIX) 40 mg tablet, Take 1 tablet (40 mg total) by mouth daily, Disp: 30 tablet, Rfl: 1  •  ramipril (ALTACE) 10 MG capsule, TAKE 1 CAPSULE TWICE A DAY, Disp: , Rfl:   •  Silodosin 8 MG CAPS, Take 8 mg by mouth daily, Disp: , Rfl:   •  aspirin (ECOTRIN LOW STRENGTH) 81 mg EC tablet, Take 1 tablet (81 mg total) by mouth daily, Disp: 30 tablet, Rfl: 0  •  atorvastatin (LIPITOR) 40 mg tablet, Take 1 tablet (40 mg total) by mouth every evening (Patient not taking: Reported on 12/19/2022), Disp: 30 tablet, Rfl: 0  •  FREESTYLE LITE test strip, , Disp: , Rfl:   •  loratadine (CLARITIN) 10 mg tablet, loratadine 10 mg tablet (Patient not taking: Reported on 12/19/2022), Disp: , Rfl:           • Whom besides the patient is providing clinical information about today's encounter?   o NO ADDITIONAL HISTORIAN (patient alone provided history)    Physical Exam and Assessment/Plan by Diagnosis:

## 2023-06-19 ENCOUNTER — OFFICE VISIT (OUTPATIENT)
Age: 73
End: 2023-06-19
Payer: MEDICARE

## 2023-06-19 VITALS — WEIGHT: 201 LBS | HEIGHT: 75 IN | BODY MASS INDEX: 24.99 KG/M2

## 2023-06-19 DIAGNOSIS — Z13.89 SCREENING FOR SKIN CONDITION: ICD-10-CM

## 2023-06-19 DIAGNOSIS — L57.0 KERATOSIS, ACTINIC: ICD-10-CM

## 2023-06-19 DIAGNOSIS — Z85.828 HISTORY OF SKIN CANCER: Primary | ICD-10-CM

## 2023-06-19 DIAGNOSIS — Q82.8 POROKERATOSIS: ICD-10-CM

## 2023-06-19 DIAGNOSIS — D22.9 MULTIPLE NEVI: ICD-10-CM

## 2023-06-19 DIAGNOSIS — L82.1 SEBORRHEIC KERATOSIS: ICD-10-CM

## 2023-06-19 PROCEDURE — 17000 DESTRUCT PREMALG LESION: CPT | Performed by: DERMATOLOGY

## 2023-06-19 PROCEDURE — 99214 OFFICE O/P EST MOD 30 MIN: CPT | Performed by: DERMATOLOGY

## 2023-06-19 RX ORDER — EMPAGLIFLOZIN, METFORMIN HYDROCHLORIDE 10; 1000 MG/1; MG/1
1 TABLET, EXTENDED RELEASE ORAL
COMMUNITY
Start: 2023-05-09

## 2023-06-19 RX ORDER — ERGOCALCIFEROL 1.25 MG/1
5000 CAPSULE ORAL DAILY
COMMUNITY

## 2023-06-19 NOTE — PROGRESS NOTES
[facility]     Patient Name: Maryann Marsh  Encounter Date: June 19, 2023     Have you been cared for by a [place] [id number] Dermatologist in the last 3 years and, if so, which description applies to you? Yes  I have been here within the last 3 years, and my medical history has NOT changed since that time  I am MALE/not capable of bearing children  REVIEW OF SYSTEMS:  Have you recently had or currently have any of the following? · No changes in my recent health  PAST MEDICAL HISTORY:  Have you personally ever had or currently have any of the following? If \"YES,\" then please provide more detail  · No changes in my medical history  FAMILY HISTORY:  Any \"first degree relatives\" (parent, brother, sister, or child) with the following? • No changes in my family's known health  PATIENT EXPERIENCE:    • Do you want the Dermatologist to perform a COMPLETE skin exam today including a clinical examination under the \"bra and underwear\" areas? Yes  • If necessary, do we have your permission to call and leave a detailed message on your Preferred Phone number that includes your specific medical information?   Yes      No Known Allergies   Current Outpatient Medications:   •  amLODIPine (NORVASC) 10 mg tablet, Take 10 mg by mouth daily, Disp: , Rfl:   •  apixaban (ELIQUIS) 5 mg, 2 (two) times a day, Disp: , Rfl:   •  aspirin (ECOTRIN LOW STRENGTH) 81 mg EC tablet, Take 1 tablet (81 mg total) by mouth daily, Disp: 30 tablet, Rfl: 0  •  atorvastatin (LIPITOR) 40 mg tablet, Take 1 tablet (40 mg total) by mouth every evening (Patient not taking: Reported on 12/19/2022), Disp: 30 tablet, Rfl: 0  •  B-D UF III MINI PEN NEEDLES 31G X 5 MM MISC, , Disp: , Rfl:   •  clopidogrel (Plavix) 75 mg tablet, Take 1 tablet (75 mg total) by mouth daily, Disp: 90 tablet, Rfl: 3  •  Continuous Blood Gluc  (FreeStyle Kathya 14 Day [place]) DIOGO, Use 1 each every 14 (fourteen) days, Disp: , Rfl:   •  fluticasone " (FLONASE) 50 mcg/act nasal spray, , Disp: , Rfl:   •  FREESTYLE LITE test strip, , Disp: , Rfl:   •  glimepiride (AMARYL) 2 mg tablet, Take 2 mg by mouth, Disp: , Rfl:   •  insulin aspart (NovoLOG) 100 units/mL injection, Inject 5 Units under the skin 3 (three) times a day with meals, Disp: , Rfl:   •  insulin detemir (LEVEMIR FLEXTOUCH) 100 Units/mL injection pen, INJECT 21 UNITS AT BEDTIME, Disp: , Rfl:   •  Jardiance 10 MG TABS, , Disp: , Rfl:   •  loratadine (CLARITIN) 10 mg tablet, loratadine 10 mg tablet (Patient not taking: Reported on 12/19/2022), Disp: , Rfl:   •  metFORMIN (GLUCOPHAGE-XR) 750 mg 24 hr tablet, Take 750 mg by mouth daily with breakfast, Disp: , Rfl:   •  metoprolol succinate (TOPROL-XL) 25 mg 24 hr tablet, Take 1 tablet (25 mg total) by mouth daily, Disp: 30 tablet, Rfl: 0  •  pantoprazole (PROTONIX) 40 mg tablet, Take 1 tablet (40 mg total) by mouth daily, Disp: 30 tablet, Rfl: 1  •  ramipril (ALTACE) 10 MG capsule, TAKE 1 CAPSULE TWICE A DAY, Disp: , Rfl:   •  Silodosin 8 MG CAPS, Take 8 mg by mouth daily, Disp: , Rfl:         67year old male presents for follow up with previous history of skin cancer concerned regarding lesion on R knee area  • Whom besides the patient is providing clinical information about today's encounter?   o NO ADDITIONAL HISTORIAN (patient alone provided history)    Physical Exam and Assessment/Plan by Diagnosis:        HISTORY OF BASAL CELL CARCINOMA    Physical Exam:  • Anatomic Location Affected:  Left Post Auricular Ear  • Morphological Description of scar:  Well healed  • Suspected Recurrence: No  • Pertinent Positives:  • Pertinent Negatives:       Additional History of Present Condition:  History of basal cell carcinoma with no sign of recurrence    Assessment and Plan:  Based on a thorough discussion of this condition and the management approach to it (including a comprehensive discussion of the known risks, side effects and potential benefits of treatment), the patient (family) agrees to implement the following specific plan:  • Continue to monitor      ACTINIC KERATOSIS    Physical Exam:  • Anatomic Location: Left ear,   • Morphologic Description: Scaly pink papules  • Pertinent Positives:  • Pertinent Negatives:  Physical Exam  HENT:      Head: Additional History of Present Condition:  Present on exam  • History of bleeding from this lesion? NO  • History of pain, tenderness, and/or itching within this lesion? NO  • Personal history of skin cancer? YES, [address] - 2021  • Family history of skin cancer? NO  • Previous treatment to the same site? NO    Plan:  • Cryotherapy performed in the office (See Procedure Note)  We discussed that a hypopigmentation or scar may form in the region following cryotherapy  • We discussed the pre-cancerous nature of the condition  Actinic keratosis is found on sun-damaged skin and there is small risk that the condition could turn into a skin cancer called squamous cell carcinoma  There is no risk of actinic keratosis turning into melanoma  • We discussed sun protection measures, including using sunscreen with an SPF 50+ year round, avoiding the sun, and wearing protective clothing such as long sleeves and pants when out in the sun  • Continue to monitor clinically for signs of recurrence  Discussed with patient the importance of keeping up to date with full body skin exams  PROCEDURES PERFORMED TODAY ASSOCIATED WITH THIS CONDITION:          Cryotherapy: PROCEDURE:  DESTRUCTION OF PRE-MALIGNANT LESIONS  After a thorough discussion of treatment options and risk/benefits/alternatives (including but not limited to local pain, scarring, dyspigmentation, blistering, and possible superinfection), verbal and written consent were obtained and the aforementioned lesions were treated on with cryotherapy using liquid nitrogen x 1 cycle for 5-10 seconds      • TOTAL NUMBER of 1 pre-malignant lesions were treated today on the "ANATOMIC LOCATION: Left ear    The patient tolerated the procedure well, and after-care instructions were provided  MELANOCYTIC NEVI (\"Moles\")    Physical Exam:  • Anatomic Location Affected: Mostly on sun-exposed areas of the trunk and extremities  • Morphological Description:  Scattered, 1-4mm round to ovoid, symmetrical-appearing, even bordered, skin colored to dark brown macules/papules, mostly in sun-exposed areas  • Pertinent Positives:  • Pertinent Negatives: Additional History of Present Condition:  Present on exam    Assessment and Plan:  Based on a thorough discussion of this condition and the management approach to it (including a comprehensive discussion of the known risks, side effects and potential benefits of treatment), the patient (family) agrees to implement the following specific plan:  • Provided handout with information regarding the ABCDE's of moles   • Recommend routine skin exams every 6 months   • Sun avoidance, protective clothing (known as UPF clothing), and the use of at least SPF 30 sunscreens is advised  Sunscreen should be reapplied every two hours when outside  SEBORRHEIC KERATOSIS; NON-INFLAMED    Physical Exam:  • Anatomic Location Affected:  scattered across trunk, extremities  • Morphological Description:  Flat and raised, waxy, smooth to warty textured, yellow to brownish-grey to dark brown to blackish, discrete, \"stuck-on\" appearing papules  • Pertinent Positives:  • Pertinent Negatives: Additional History of Present Condition:  Patient reports new bumps on the skin  Denies itch, burn, pain, bleeding or ulceration  Present constantly; nothing seems to make it worse or better  No prior treatment        Assessment and Plan:  Based on a thorough discussion of this condition and the management approach to it (including a comprehensive discussion of the known risks, side effects and potential benefits of treatment), the patient (family) agrees to implement the " following specific plan:  • Reassured benign    Porokeratosis    Physical Exam:  • (Anatomic Location); (Size and Morphological Description); (Differential Diagnosis):  o Right knee with a 15mm erythematous patch with a collarette of scale at the periphery, also similar lesion noted on the left lower leg  • Pertinent Positives:  • Pertinent Negatives: Additional History of Present Condition:  Present on exam has been present for years no essential change      Assessment and Plan:  Based on a thorough discussion of this condition and the management approach to it (including a comprehensive discussion of the known risks, side effects and potential benefits of treatment), the patient (family) agrees to implement the following specific plan:  • Benign and reassured      Scribe Attestation    I,:  Chelsie Fang am acting as a scribe while in the presence of the attending physician :       I,:  Rosina Kingston MD personally performed the services described in this documentation    as scribed in my presence :

## 2023-06-19 NOTE — PATIENT INSTRUCTIONS
"Treatment with Cryotherapy    The doctor has treated your skin with nitrogen, which is 320 degrees Fahrenheit below zero  He has given the treated area \"frostbite  \"    Stinging should subside within a few hours  You can take Tylenol for pain, if needed  Over the next few days, it is normal if the area becomes reddened, a blood blister, or swollen with fluid  If the lesion treated was near the eye - you could get a swollen eye over the next few days  Do not panic! This is all temporary, and will resolve with time  There is no special treatment - just keep the area clean  Makeup and BandAids can be used, if preferred  When the area starts to dry up and peel off, using Vaseline can help healing  It usually takes up to a month for it to heal   Some lesions are recurrent and may require repeat treatments  If a lesion has not healed in one month, please don't hesitate to contact us  If you have any further questions that are not answered here, please call us  [phone]    Thank you for allowing us to care for you  MELANOCYTIC NEVI (\"Moles\")    Melanocytic nevi (\"moles\") are tan or brown, raised or flat areas of the skin which have an increased number of melanocytes  Melanocytes are the cells in our body which make pigment and account for skin color  Some moles are present at birth (I e , \"congenital nevi\"), while others come up later in life (i e , \"acquired nevi\")  The sun can stimulate the body to make more moles  Sunburns are not the only thing that triggers more moles  Chronic sun exposure can do it too  Clinically distinguishing a healthy mole from melanoma may be difficult, even for experienced dermatologists  The \"ABCDE's\" of moles have been suggested as a means of helping to alert a person to a suspicious mole and the possible increased risk of melanoma    The suggestions for raising alert are as follows:    Asymmetry: Healthy moles tend to be symmetric, while melanomas are " "often asymmetric  Asymmetry means if you draw a line through the mole, the two halves do not match in color, size, shape, or surface texture  Asymmetry can be a result of rapid enlargement of a mole, the development of a raised area on a previously flat lesion, scaling, ulceration, bleeding or scabbing within the mole  Any mole that starts to demonstrate \"asymmetry\" should be examined promptly by a board certified dermatologist      Border: Healthy moles tend to have discrete, even borders  The border of a melanoma often blends into the normal skin and does not sharply delineate the mole from normal skin  Any mole that starts to demonstrate \"uneven borders\" should be examined promptly by a board certified dermatologist      Color: Healthy moles tend to be one color throughout  Melanomas tend to be made up of different colors ranging from dark black, blue, white, or red  Any mole that demonstrates a color change should be examined promptly by a board certified dermatologist      Diameter: Healthy moles tend to be smaller than 0 6 cm in size; an exception are \"congenital nevi\" that can be larger  Melanomas tend to grow and can often be greater than 0 6 cm (1/4 of an inch, or the size of a pencil eraser)  This is only a guideline, and many normal moles may be larger than 0 6 cm without being unhealthy  Any mole that starts to change in size (small to bigger or bigger to smaller) should be examined promptly by a board certified dermatologist      Evolving: Healthy moles tend to \"stay the same  \"  Melanomas may often show signs of change or evolution such as a change in size, shape, color, or elevation    Any mole that starts to itch, bleed, crust, burn, hurt, or ulcerate or demonstrate a change or evolution should be examined promptly by a board certified dermatologist       Dysplastic Nevi  Dysplastic moles are moles that fit the ABCDE rules of melanoma but are not identified as melanomas when examined under the " "microscope  They may indicate an increased risk of melanoma in that person  If there is a family history of melanoma, most experts agree that the person may be at an increased risk for developing a melanoma  Experts still do not agree on what dysplastic moles mean in patients without a personal or family history of melanoma  Dysplastic moles are usually larger than common moles and have different colors within it with irregular borders  The appearance can be very similar to a melanoma  Biopsies of dysplastic moles may show abnormalities which are different from a regular mole  Melanoma  Malignant melanoma is a type of skin cancer that can be deadly if it spreads throughout the body  The incidence of melanoma in the [place] is growing faster than any other cancer  Melanoma usually grows near the surface of the skin for a period of time, and then begins to grow deeper into the skin  Once it grows deeper into the skin, the risk of spread to other organs greatly increases  Therefore, early detection and removal of a malignant melanoma may result in a better chance at a complete cure; removal after the tumor has spread may not be as effective, leading to worse clinical outcomes such as death  The true rate of nevus transformation into a melanoma is unknown  It has been estimated that the lifetime risk for any acquired melanocytic nevus on any 21year-old individual transforming into melanoma by age [de-identified] is 0 03% (1 in 3,164) for men and 0 009% (1 in 10,800) for women  The appearance of a \"new mole\" remains one of the most reliable methods for identifying a malignant melanoma  Occasionally, melanomas appear as rapidly growing, blue-black, dome-shaped bumps within a previous mole or previous area of normal skin  Other times, melanomas are suspected when a mole suddenly appears or changes   Itching, burning, or pain in a pigmented lesion should increase suspicion, but most patients with early melanoma " "have no skin discomfort whatsoever  Melanoma can occur anywhere on the skin, including areas that are difficult for self-examination  Many melanomas are first noticed by other family members  Suspicious-looking moles may be removed for microscopic examination  You may be able to prevent death from melanoma by doing two simple things:    Try to avoid unnecessary sun exposure and protect your skin when it is exposed to the sun  People who live near the equator, people who have intermittent exposures to large amounts of sun, and people who have had sunburns in childhood or adolescence have an increased risk for melanoma  Sun sense and vigilant sun protection may be keys to helping to prevent melanoma  We recommend wearing UPF-rated sun protective clothing and sunglasses whenever possible and applying a moisturizer-sunscreen combination product (SPF 50+) such as Neutrogena Daily Defense to sun exposed areas of skin at least three times a day  Have your moles regularly examined by a board certified dermatologist AND by yourself or a family member/friend at home  We recommend that you have your moles examined at least once a year by a board certified dermatologist   Use your birthday as an annual reminder to have your \"Birthday Suit\" (I e , your skin) examined; it is a nice birthday gift to yourself to know that your skin is healthy appearing! Additionally, at-home self examinations may be helpful for detecting a possible melanoma  Use the ABCDEs we discussed and check your moles once a month at home  SEBORRHEIC KERATOSIS  A seborrheic keratosis is a harmless warty spot that appears during adult life as a common sign of skin aging  Seborrheic keratoses can arise on any area of skin, covered or uncovered, with the usual exception of the palms and soles  They do not arise from mucous membranes  Seborrheic keratoses can have highly variable appearance  Seborrheic keratoses are extremely common   " "It has been estimated that over 90% of adults over the age of 61 years have one or more of them  They occur in males and females of all races, typically beginning to erupt in the 35s or 45s  They are uncommon under the age of 21 years  The precise cause of seborrhoeic keratoses is not known  Seborrhoeic keratoses are considered degenerative in nature  As time goes by, seborrheic keratoses tend to become more numerous  Some people inherit a tendency to develop a very large number of them; some people may have hundreds of them  The name \"seborrheic keratosis\" is misleading, because these lesions are not limited to a seborrhoeic distribution (scalp, mid-face, chest, upper back), nor are they formed from sebaceous glands, nor are they associated with sebum -- which is greasy  Seborrheic keratosis may also be called \"SK,\" \"Seb K,\" \"basal cell papilloma,\" \"senile wart,\" or \"barnacle  \"      There is no easy way to remove multiple lesions on a single occasion  Unless a specific lesion is \"inflamed\" and is causing pain or stinging/burning or is bleeding, most insurance companies do not authorize treatment  ANGIOMA (\"CHERRY ANGIOMA\")  Jane angiomas markedly increase in number from about the age of 36, so it has been estimated that 75% of people over 76years of age have them  Although they also called \"senile angiomas,\" they can occur in young people too - 5% of adolescents have been found to have them  Cherry angiomas are very common in males and females of any age or race, with no difference in sexes or races affected  They are however more noticeable in white skin than in skin of colour  There may be a family history of similar lesions  Eruptive (very large number appearing in a short period of time) cherry angiomas have been rarely reported to be associated with internal malignancy and pregnancy  ACTINIC KERATOSIS    Actinic keratoses are very common on sites repeatedly exposed to the sun, especially the " backs of the hands and the face, most often affecting the ears, nose, cheeks, upper lip, vermilion of the lower lip, temples, forehead and balding scalp  In severely chronically sun-damaged individuals, they may also be found on the upper trunk, upper and lower limbs, and dorsum of feet  We discussed the theoretical premalignant (“pre-cancerous”) nature and etiology of these growths  We discussed the prevailing notion that actinic keratoses are a reflection of abnormal skin cell development due to DNA damage by short wavelength UVB  They are more likely to appear if the immune function is poor, due to aging, recent sun exposure, predisposing disease or certain drugs  We discussed that the main concern is that actinic keratoses may predispose to squamous cell carcinoma  It is rare for a solitary actinic keratosis to evolve to squamous cell carcinoma (SCC), but the risk of SCC occurring at some stage in a patient with more than 10 actinic keratoses is thought to be about 10 to 15%  A tender, thickened, ulcerated or enlarging actinic keratosis is suspicious of SCC  Actinic keratoses may be prevented by strict sun protection  If already present, keratoses may improve with a very high sun protection factor (50+) broad-spectrum sunscreen applied at least daily to affected areas, year-round  We recommend that UPF-rated clothing and hats and sunglasses be worn whenever possible and that a sunscreen-moisturizer combination product such as Neutrogena Daily Defense be applied at least three times a day      We performed a thorough discussion of treatment options and specific risk/benefits/alternatives including but not limited to medical “field” treatment with medications such as the following:    Topical “field area” medications such as 5-fluorouracil or Aldara (specifically, the trouble with long-term compliance, blistering and local skin reaction versus the convenience of at-home therapy and that field therapy “gets what is not yet seen”)  Cryotherapy (specifically, local pain, scarring, dyspigmentation, blistering, possible superinfection, and treats “only what we see” versus directed treatment today)  Photodynamic therapy (specifically, local pain, scarring, dyspigmentation, blistering, possible superinfection, need to schedule for a later date, and time spent in the office versus field therapy that “gets what is not yet seen”)  BASAL CELL CARCINOMA    What is basal cell carcinoma? Basal cell carcinoma (BCC) is a common, locally invasive, keratinocytic, or non-melanoma, skin cancer  It is also known as rodent ulcer and basalioma  Patients with BCC often develop multiple primary tumours over time  Who gets basal cell carcinoma? Risk factors for BCC include:  Age and sex: BCCs are particularly prevalent in elderly males  However, they also affect females and younger adults   Previous BCC or other form of skin cancer (squamous cell carcinoma, melanoma)   Sun damage (photoaging, actinic keratoses)   Repeated prior episodes of sunburn   Fair skin, blue eyes and blond or red hair--note; BCC can also affect darker skin types   Previous cutaneous injury, thermal burn, disease (eg cutaneous lupus, sebaceous naevus)   Inherited syndromes: BCC is a particular problem for families with basal cell naevus syndrome (Gorlin syndrome), Rrnvk-Xogyé-Dynrrbsp syndrome, Rombo syndrome, Oley syndrome and xeroderma pigmentosum   Other risk factors include ionising radiation, exposure to arsenic, and immune suppression due to disease or medicines    What causes basal cell carcinoma? The cause of BCC is multifactorial   Most often, there are DNA mutations in the patched [facility]) tumour suppressor gene, part of hedgehog signaling pathway   These may be triggered by exposure to ultraviolet radiation   Various spontaneous and inherited gene defects predispose to [facility]    What are the clinical features of basal cell carcinoma?   BCC is a locally invasive skin tumour  The main characteristics are:  Slowly growing plaque or nodule   Skin coloured, pink or pigmented   Varies in size from a few millimetres to several centimetres in diameter   Spontaneous bleeding or ulceration  BCC is very rarely a threat to life  A tiny proportion of BCCs grow rapidly, invade deeply, and/or metastasise to local lymph nodes  Types of basal cell carcinoma  There are several distinct clinical types of BCC, and over 20 histological growth patterns of BCC  Nodular BCC  Most common type of facial BCC   Shiny or pearly nodule with a smooth surface   May have central depression or ulceration, so its edges appear rolled   Blood vessels cross its surface   Cystic variant is soft, with jelly-like contents   Micronodular, microcystic and infiltrative types are potentially aggressive subtypes   Also known as nodulocystic carcinoma  Superficial BCC  Most common type in younger adults   Most common type on upper trunk and shoulders   Slightly scaly, irregular plaque   Thin, translucent rolled border   Multiple microerosions  Morphoeaform BCC  Usually found in mid-facial sites   Waxy, scar-like plaque with indistinct borders   Wide and deep subclinical extension   May infiltrate cutaneous nerves (perineural spread)   Also known as morpheic, morphoeiform or sclerosing BCC  Basosquamous carcinoma  Mixed basal cell carcinoma (BCC) and squamous cell carcinoma (SCC)   Infiltrative growth pattern   Potentially more aggressive than other forms of BCC   Also known as basisquamous carcinoma and mixed basal-squamous cell carcinoma       Complications of basal cell carcinoma    Recurrent BCC  Recurrence of BCC after initial treatment is not uncommon  Characteristics of recurrent BCC often include:   Incomplete excision or narrow margins at primary excision   Morphoeic, micronodular, and infiltrative subtypes   Location on head and neck    Advanced BCC  Advanced BCCs are large, often neglected tumours  They may be several centimetres in diameter   They may be deeply infiltrating into tissues below the skin   They are difficult or impossible to treat surgically    Metastatic BCC  Very rare   Primary tumour is often large, neglected or recurrent, located on head and neck, with aggressive subtype   May have had multiple prior treatments   May arise in site exposed to ionising radiation   Can be fatal    How is basal cell carcinoma diagnosed? BCC is diagnosed clinically by the presence of a slowly enlarging skin lesion with typical appearance  The diagnosis and  by a diagnostic biopsy or following excision  Some typical superficial BCCs on trunk and limbs are clinically diagnosed and have non-surgical treatment without histology  What is the treatment for primary basal cell carcinoma? The treatment for a [address] depends on its type, size and location, the number to be treated, patient factors, and the preference or expertise of the doctor  Most BCCs are treated surgically  Long-term follow-up is recommended to check for new lesions and recurrence; the latter may be unnecessary if histology has reported wide clear margins  Excision biopsy  Excision means the lesion is cut out and the skin stitched up  Most appropriate treatment for nodular, infiltrative and morphoeic BCCs   Should include 3 to 5 mm margin of normal skin around the tumour   Very large lesions may require flap or skin graft to repair the defect   Pathologist will report deep and lateral margins   Further surgery is recommended for lesions that are incompletely excised    Mohs micrographically controlled excision  Mohs micrographically controlled surgery involves examining carefully marked excised tissue under the microscope, layer by layer, to ensure complete excision    Very high cure rates achieved by trained Mohs surgeons   Used in high-risk areas of the face around eyes, lips and nose   Suitable for ill-defined, morphoeic, infiltrative and recurrent subtypes   Large defects are repaired by flap or skin graft    Superficial skin surgery  Superficial skin surgery comprises shave, curettage, and electrocautery  It is a rapid technique using local anaesthesia and does not require sutures  Suitable for small, well-defined nodular or superficial BCCs   Lesions are usually located on trunk or limbs   Wound is left open to heal by secondary intention   Moist wound dressings lead to healing within a few weeks   Eventual scar quality variable    Cryotherapy  Cryotherapy is the treatment of a superficial skin lesion by freezing it, usually with liquid nitrogen  Suitable for small superficial BCCs on covered areas of trunk and limbs   Best avoided for BCCs on head and neck, and distal to knees   Double freeze-thaw technique   Results in a blister that crusts over and heals within several weeks  Leaves permanent white nii    Photodynamic therapy  Photodynamic therapy (PDT) refers to a technique in which [facility] is treated with a photosensitising chemical, and exposed to light several hours later  Topical photosensitisers include aminolevulinic acid lotion and methyl aminolevulinate cream   Suitable for low-risk small, superficial BCCs   Best avoided if tumour in site at high risk of recurrence   Results in inflammatory reaction, maximal 3-4 days after procedure   Treatment repeated 7 days after initial treatment   Excellent cosmetic results    Imiquimod cream  Imiquimod is an immune response modifier  Best used for superficial BCCs less than 2 cm diameter   Applied three to five times each week, for 6-16 weeks   Results in a variable inflammatory reaction, maximal at three weeks   Minimal scarring is usual    Fluorouracil cream  5-Fluorouracil cream is a topical cytotoxic agent    Used to treat small superficial basal cell carcinomas   Requires prolonged course, eg twice daily for 6-12 weeks   Causes inflammatory reaction   Has high recurrence rates    Radiotherapy  Radiotherapy or X-ray treatment can be used to treat primary BCCs or as adjunctive treatment if margins are incomplete  Mainly used if surgery is not suitable   Best avoided in young patients and in genetic conditions predisposing to skin cancer   Best cosmetic results achieved using multiple fractions   Typically, patient attends once-weekly for several weeks   Causes inflammatory reaction followed by scar   Risk of radiodermatitis, late recurrence, and new tumours    What is the treatment for advanced or metastatic basal cell carcinoma? Locally advanced primary, recurrent or metastatic BCC requires multidisciplinary consultation  Often a combination of treatments is used  Surgery   Radiotherapy   Targeted therapy  Targeted therapy refers to the hedgehog signalling pathway inhibitors, vismodegib and sonidegib  These drugs have some important risks and side effects  How can basal cell carcinoma be prevented? The most important way to prevent BCC is to avoid sunburn  This is especially important in childhood and early life  Fair skinned individuals and those with a personal or family history of BCC should protect their skin from sun exposure daily, year-round and lifelong  Stay indoors or under the shade in the middle of the day   Wear covering clothing   Apply high protection factor SPF50+ broad-spectrum sunscreens generously to exposed skin if outdoors   Avoid indoor tanning (sun beds, solaria)  Oral nicotinamide (vitamin B3) in a dose of 500 mg twice daily may reduce the number and severity of BCCs  What is the outlook for basal cell carcinoma? Most BCCs are cured by treatment  Cure is most likely if treatment is undertaken when the lesion is small  About 50% of people with BCC develop a second one within 3 years of the first  They are also at increased risk of other skin cancers, especially melanoma   Regular self-skin examinations and long-term annual skin checks by an experienced health professional are recommended  SQUAMOUS CELL CARCINOMA    What is cutaneous squamous cell carcinoma? Cutaneous squamous cell carcinoma (SCC) is a common type of keratinocyte or non-melanoma skin cancer  It is derived from cells within the epidermis that make keratin -- the horny protein that makes up skin, hair and nails  Cutaneous SCC is an invasive disease, referring to cancer cells that have grown beyond the epidermis  SCC can sometimes metastasise and may prove fatal   Intraepidermal carcinoma (cutaneous SCC in situ) and mucosal SCC are considered elsewhere  Who gets cutaneous squamous cell carcinoma? Risk factors for cutaneous SCC include:  Age and sex: SCCs are particularly prevalent in elderly males  However, they also affect females and younger adults  Previous SCC or another form of skin cancer (basal cell carcinoma, melanoma) are a strong predictor for further skin cancers  Actinic keratoses   Outdoor occupation or recreation   Smoking   Fair skin, blue eyes and blond or red hair   Previous cutaneous injury, thermal burn, disease (eg cutaneous lupus, epidermolysis bullosa, leg ulcer)   Inherited syndromes: SCC is a particular problem for families with xeroderma pigmentosum and albinism   Other risk factors include ionising radiation, exposure to arsenic, and immune suppression due to disease (eg chronic lymphocytic leukaemia) or medicines  Organ transplant recipients have a massively increased risk of developing SCC  What causes cutaneous squamous cell carcinoma? More than 90% of cases of SCC are associated with numerous DNA mutations in multiple somatic genes  Mutations in the p53 tumour suppressor gene are caused by exposure to ultraviolet radiation (UV), especially UVB (known as signature 7)  Other signature mutations relate to cigarette smoking, ageing and immune suppression (eg, to drugs such as azathioprine)   Mutations in signalling pathways affect the epidermal growth factor receptor, CHICHI, Fyn, and [id number] signalling  Beta-genus human papillomaviruses (wart virus) are thought to play a role in SCC arising in immune-suppressed populations  ?-HPV and HPV subtypes 5, 8, 17, 20, 24, and 38 have also been associated with an increased risk of cutaneous SCC in immunocompetent individuals  What are the clinical features of cutaneous squamous cell carcinoma? Cutaneous SCCs present as enlarging scaly or crusted lumps  They usually arise within pre-existing actinic keratosis or intraepidermal carcinoma  They grow over weeks to months   They may ulcerate   They are often tender or painful   Located on sun-exposed sites, particularly the face, lips, ears, hands, forearms and lower legs   Size varies from a few millimetres to several centimetres in diameter  Types of cutaneous squamous cell carcinoma  Distinct clinical types of invasive cutaneous SCC include:  Cutaneous horn -- the horn is due to excessive production of keratin   Keratoacanthoma (KA) -- a rapidly growing keratinising nodule that may resolve without treatment   Carcinoma cuniculatum (‘verrucous carcinoma’), a slow-growing, warty tumour on the sole of the foot  Multiple eruptive SCC/KA-like lesions arising in syndromes, such as multiple self-healing squamous epitheliomas of George-Smith and Grzybowski syndrome  The pathologist may classify a tumour as well differentiated, moderately well differentiated, poorly differentiated or anaplastic cutaneous SCC  There are other variants  Classification of squamous cell carcinoma by risk  Cutaneous SCC is classified as low-risk or high-risk, depending on the chance of tumour recurrence and metastasis   Characteristics of high-risk SCC include:  High-risk cutaneous squamous cell carcinoma has the following characteristics:  Diameter greater than or equal to 2 cm   Location on the ear, vermilion of the lip, central face, hands, feet, genitalia   Arising in elderly or immune suppressed patient   Histological thickness greater than 2 mm, poorly differentiated histology, or with the invasion of the subcutaneous tissue, nerves and blood vessels  Metastatic SCC is found in regional lymph nodes (80%), lungs, liver, brain, bones and skin  Staging cutaneous squamous cell carcinoma  In 2011, the [address] [place] [address] Ave on Cancer (CC) published a new staging systemic for cutaneous SCC for the 7th Edition of the AJCC manual  This evaluates the dimensions of the original primary tumour (T) and its metastases to lymph nodes (N)  Tumour staging for cutaneous SCC  TX: Th Primary tumour cannot be assessed  T0: No evidence of a primary tumour  Tis: Carcinoma in situ  T1: Tumour ? 2cm without high-risk features  T2: Tumour ? 2cm; or; Tumour ? 2 cm with high-risk features  T3: Tumour with the invasion of maxilla, mandible, orbit or temporal bone  T4: Tumour with the invasion of axial or appendicular skeleton or perineural invasion of skull base    Kristi staging for cutaneous SCC  NX: Regional lymph nodes cannot be assessed  N0: No regional lymph node metastasis  N1: Metastasis in one local lymph node ? 3cm  N2: Metastasis in one local lymph node ? 3cm; or; Metastasis in >1 local lymph node ? 6cm  N3: Metastasis in lymph node ? 6cm    How is squamous cell carcinoma diagnosed? Diagnosis of cutaneous SCC is based on clinical features  The diagnosis and histological subtype are confirmed pathologically by diagnostic biopsy or following excision  See squamous cell carcinoma - pathology  Patients with high-risk SCC may also undergo staging investigations to determine whether it has spread to lymph nodes or elsewhere  These may include:  Imaging using ultrasound scan, X-rays, CT scans, MRI scans   Lymph node or other tissue biopsies    What is the treatment for cutaneous squamous cell carcinoma? Cutaneous SCC is nearly always treated surgically   Most cases are excised with a 3-10 mm margin of normal tissue around a visible tumour  A flap or skin graft may be needed to repair the defect  Other methods of removal include:  Shave, curettage, and electrocautery for low-risk tumours on trunk and limbs   Aggressive cryotherapy for very small, thin, low-risk tumours   Mohs micrographic surgery for large facial lesions with indistinct margins or recurrent tumours   Radiotherapy for an inoperable tumour, patients unsuitable for surgery, or as adjuvant    What is the treatment for advanced or metastatic squamous cell carcinoma? Locally advanced primary, recurrent or metastatic SCC requires multidisciplinary consultation  Often a combination of treatments is used  Surgery   Radiotherapy   Cemiplimab   Experimental targeted therapy using epidermal growth factor receptor inhibitors    How can cutaneous squamous cell carcinoma be prevented? There is a great deal of evidence to show that very careful sun protection at any time of life reduces the number of SCCs  This is particularly important in ageing, sun-damaged, fair skin; in patients that are immune suppressed; and in those who already have actinic keratoses or previous SCC  Stay indoors or under the shade in the middle of the day   Wear covering clothing   Apply high protection factor SPF50+ broad-spectrum sunscreens generously to exposed skin if outdoors   Avoid indoor tanning (sun beds, solaria)    Oral nicotinamide (vitamin B3) in a dose of 500 mg twice daily may reduce the number and severity of SCCs in people at high risk  Patients with multiple squamous cell carcinomas may be prescribed an oral retinoid (acitretin or isotretinoin)  These reduce the number of tumours but have some nuisance side effects  What is the outlook for cutaneous squamous cell carcinoma? Most SCCs are cured by treatment  A cure is most likely if treatment is undertaken when the lesion is small   The risk of recurrence or disease-associated death is greater for tumours that are > 20 mm in diameter and/or > 2 mm in thickness at the time of surgical excision  About 50% of people at high risk of SCC develop a second one within 5 years of the first  They are also at increased risk of other skin cancers, especially melanoma  Regular self-skin examinations and long-term annual skin checks by an experienced health professional are recommended

## 2023-08-23 DIAGNOSIS — I65.09 VERTEBRAL ARTERY STENOSIS: ICD-10-CM

## 2023-08-23 RX ORDER — CLOPIDOGREL BISULFATE 75 MG/1
75 TABLET ORAL DAILY
Qty: 90 TABLET | Refills: 3 | Status: SHIPPED | OUTPATIENT
Start: 2023-08-23

## 2024-01-11 ENCOUNTER — OFFICE VISIT (OUTPATIENT)
Age: 74
End: 2024-01-11
Payer: MEDICARE

## 2024-01-11 VITALS — TEMPERATURE: 98 F | WEIGHT: 201 LBS | BODY MASS INDEX: 25.12 KG/M2

## 2024-01-11 DIAGNOSIS — Z13.89 SCREENING FOR SKIN CONDITION: Primary | ICD-10-CM

## 2024-01-11 DIAGNOSIS — L82.1 SEBORRHEIC KERATOSIS: ICD-10-CM

## 2024-01-11 DIAGNOSIS — Z85.828 HISTORY OF SKIN CANCER: ICD-10-CM

## 2024-01-11 DIAGNOSIS — D22.9 NEVUS: ICD-10-CM

## 2024-01-11 PROCEDURE — 99213 OFFICE O/P EST LOW 20 MIN: CPT | Performed by: DERMATOLOGY

## 2024-01-11 NOTE — PROGRESS NOTES
"[facility] Note     Patient Name: Miguel Poon  Encounter Date: 01/11/2024     Have you been cared for by a [facility] Dermatologist in the last 3 years and, if so, which description applies to you?    Yes.  I have been here within the last 3 years, and my medical history has NOT changed since that time.  I am MALE/not capable of bearing children.    REVIEW OF SYSTEMS:  Have you recently had or currently have any of the following? No changes in my recent health.   PAST MEDICAL HISTORY:  Have you personally ever had or currently have any of the following?  If \"YES,\" then please provide more detail. No changes in my medical history.   HISTORY OF IMMUNOSUPPRESSION: Do you have a history of any of the following:  Systemic Immunosuppression such as Diabetes, Biologic or Immunotherapy, Chemotherapy, Organ Transplantation, Bone Marrow Transplantation?  No     Answering \"YES\" requires the addition of the dotphrase \"IMMUNOSUPPRESSED\" as the first diagnosis of the patient's visit.   FAMILY HISTORY:  Any \"first degree relatives\" (parent, brother, sister, or child) with the following?    No changes in my family's known health.   PATIENT EXPERIENCE:    Do you want the Dermatologist to perform a COMPLETE skin exam today including a clinical examination under the \"bra and underwear\" areas?  Yes  If necessary, do we have your permission to call and leave a detailed message on your Preferred Phone number that includes your specific medical information?  Yes      No Known Allergies   Current Outpatient Medications:     amLODIPine (NORVASC) 10 mg tablet, Take 10 mg by mouth daily, Disp: , Rfl:     apixaban (ELIQUIS) 5 mg, 2 (two) times a day, Disp: , Rfl:     B-D UF III MINI PEN NEEDLES 31G X 5 MM MISC, , Disp: , Rfl:     clopidogrel (PLAVIX) 75 mg tablet, TAKE 1 TABLET DAILY, Disp: 90 tablet, Rfl: 3    Continuous Blood Gluc  ([employer] Kathya 14 Day [place]) DIOGO, Use 1 each every 14 (fourteen) days, Disp: " , Rfl:     ergocalciferol (ERGOCALCIFEROL) 1.25 MG (83492 UT) capsule, Take 5,000 Units by mouth daily, Disp: , Rfl:     fluticasone (FLONASE) 50 mcg/act nasal spray, , Disp: , Rfl:     FREESTYLE LITE test strip, , Disp: , Rfl:     glimepiride (AMARYL) 2 mg tablet, Take 2 mg by mouth, Disp: , Rfl:     insulin aspart (NovoLOG) 100 units/mL injection, Inject 5 Units under the skin 3 (three) times a day with meals, Disp: , Rfl:     insulin detemir (LEVEMIR FLEXTOUCH) 100 Units/mL injection pen, INJECT 21 UNITS AT BEDTIME, Disp: , Rfl:     Jardiance 10 MG TABS, , Disp: , Rfl:     loratadine (CLARITIN) 10 mg tablet, , Disp: , Rfl:     metFORMIN (GLUCOPHAGE-XR) 750 mg 24 hr tablet, Take 750 mg by mouth daily with breakfast, Disp: , Rfl:     pantoprazole (PROTONIX) 40 mg tablet, Take 1 tablet (40 mg total) by mouth daily, Disp: 30 tablet, Rfl: 1    ramipril (ALTACE) 10 MG capsule, TAKE 1 CAPSULE TWICE A DAY, Disp: , Rfl:     Synjardy XR  MG TB24, Take 1 tablet by mouth daily with dinner, Disp: , Rfl:     aspirin (ECOTRIN LOW STRENGTH) 81 mg EC tablet, Take 1 tablet (81 mg total) by mouth daily, Disp: 30 tablet, Rfl: 0    atorvastatin (LIPITOR) 40 mg tablet, Take 1 tablet (40 mg total) by mouth every evening, Disp: 30 tablet, Rfl: 0    metoprolol succinate (TOPROL-XL) 25 mg 24 hr tablet, Take 1 tablet (25 mg total) by mouth daily, Disp: 30 tablet, Rfl: 0    Silodosin 8 MG CAPS, Take 8 mg by mouth daily, Disp: , Rfl:           Whom besides the patient is providing clinical information about today's encounter?   NO ADDITIONAL HISTORIAN (patient alone provided history)    Physical Exam and Assessment/Plan by Diagnosis:   Chief Complaint   Patient presents with    Follow-up     6 month skin exam concern on his scalp      HISTORY OF BASAL CELL CARCINOMA     Physical Exam:  Anatomic Location Affected:  Left Post Auricular Ear 11/30/2021  Morphological Description of scar:  Well healed  Suspected Recurrence: No  Pertinent  "Positives:  Pertinent Negatives:        Additional History of Present Condition:  History of basal cell carcinoma with no sign of recurrence     Assessment and Plan:  Based on a thorough discussion of this condition and the management approach to it (including a comprehensive discussion of the known risks, side effects and potential benefits of treatment), the patient (family) agrees to implement the following specific plan:  Continue to monitor  MELANOCYTIC NEVI (\"Moles\")    Physical Exam:  Anatomic Location Affected: Mostly on sun-exposed areas of the body.  Morphological Description:  Scattered, 1-4mm round to ovoid, symmetrical-appearing, even bordered, skin colored to dark brown macules/papules, mostly in sun-exposed areas  Pertinent Positives:  Pertinent Negatives:    Additional History of Present Condition:  present on exam     Assessment and Plan:  Based on a thorough discussion of this condition and the management approach to it (including a comprehensive discussion of the known risks, side effects and potential benefits of treatment), the patient (family) agrees to implement the following specific plan:  Provided handout with information regarding the ABCDE's of moles   Recommend routine skin exams every 6 month.   Sun avoidance, protective clothing (known as UPF clothing), and the use of at least SPF 30 sunscreens is advised. Sunscreen should be reapplied every two hours when outside.       SEBORRHEIC KERATOSIS; NON-INFLAMED    Physical Exam:  Anatomic Location Affected:  scattered across trunk, extremities,  face  Morphological Description:  Flat and raised, waxy, smooth to warty textured, yellow to brownish-grey to dark brown to blackish, discrete, \"stuck-on\" appearing papules.  Pertinent Positives:  Pertinent Negatives:    Additional History of Present Condition:  Patient reports new bumps on the skin.  Denies itch, burn, pain, bleeding or ulceration.  Present constantly; nothing seems to make it worse " or better.  No prior treatment.      Assessment and Plan:  Based on a thorough discussion of this condition and the management approach to it (including a comprehensive discussion of the known risks, side effects and potential benefits of treatment), the patient (family) agrees to implement the following specific plan:  Reassured benign                   Scribe Attestation      I,:  Breanne Thomas am acting as a scribe while in the presence of the attending physician.:       I,:  Mansoor Cardoso MD personally performed the services described in this documentation    as scribed in my presence.:

## 2024-01-11 NOTE — PATIENT INSTRUCTIONS
ACTINIC KERATOSIS    Actinic keratoses are very common on sites repeatedly exposed to the sun, especially the backs of the hands and the face, most often affecting the ears, nose, cheeks, upper lip, vermilion of the lower lip, temples, forehead and balding scalp. In severely chronically sun-damaged individuals, they may also be found on the upper trunk, upper and lower limbs, and dorsum of feet.    We discussed the theoretical premalignant (“pre-cancerous”) nature and etiology of these growths.  We discussed the prevailing notion that actinic keratoses are a reflection of abnormal skin cell development due to DNA damage by short wavelength UVB.  They are more likely to appear if the immune function is poor, due to aging, recent sun exposure, predisposing disease or certain drugs.    We discussed that the main concern is that actinic keratoses may predispose to squamous cell carcinoma. It is rare for a solitary actinic keratosis to evolve to squamous cell carcinoma (SCC), but the risk of SCC occurring at some stage in a patient with more than 10 actinic keratoses is thought to be about 10 to 15%. A tender, thickened, ulcerated or enlarging actinic keratosis is suspicious of SCC.    Actinic keratoses may be prevented by strict sun protection. If already present, keratoses may improve with a very high sun protection factor (50+) broad-spectrum sunscreen applied at least daily to affected areas, year-round.  We recommend that UPF-rated clothing and hats and sunglasses be worn whenever possible and that a sunscreen-moisturizer combination product such as Neutrogena Daily Defense be applied at least three times a day.    We performed a thorough discussion of treatment options and specific risk/benefits/alternatives including but not limited to medical “field” treatment with medications such as the following:    Topical “field area” medications such as 5-fluorouracil or Aldara (specifically, the trouble with long-term  compliance, blistering and local skin reaction versus the convenience of at-home therapy and that field therapy “gets what is not yet seen”).    Cryotherapy (specifically, local pain, scarring, dyspigmentation, blistering, possible superinfection, and treats “only what we see” versus directed treatment today).    Photodynamic therapy (specifically, local pain, scarring, dyspigmentation, blistering, possible superinfection, need to schedule for a later date, and time spent in the office versus field therapy that “gets what is not yet seen”).      BASAL CELL CARCINOMA    What is basal cell carcinoma?  Basal cell carcinoma (BCC) is a common, locally invasive, keratinocytic, or non-melanoma, skin cancer. It is also known as rodent ulcer and basalioma. Patients with BCC often develop multiple primary tumours over time.    Who gets basal cell carcinoma?  Risk factors for BCC include:  Age and sex: BCCs are particularly prevalent in elderly males. However, they also affect females and younger adults   Previous BCC or other form of skin cancer (squamous cell carcinoma, melanoma)   Sun damage (photoaging, actinic keratoses)   Repeated prior episodes of sunburn   Fair skin, blue eyes and blond or red hair--note; BCC can also affect darker skin types   Previous cutaneous injury, thermal burn, disease (eg cutaneous lupus, sebaceous naevus)   Inherited syndromes: BCC is a particular problem for families with basal cell naevus syndrome (Gorlin syndrome), Ktren-Pxfoé-Rxdyntfq syndrome, Rombo syndrome, Oley syndrome and xeroderma pigmentosum   Other risk factors include ionising radiation, exposure to arsenic, and immune suppression due to disease or medicines    What causes basal cell carcinoma?  The cause of BCC is multifactorial.  Most often, there are DNA mutations in the patched (PTCH) tumour suppressor gene, part of hedgehog signaling pathway   These may be triggered by exposure to ultraviolet radiation   Various spontaneous  and inherited gene defects predispose to BCC    What are the clinical features of basal cell carcinoma?  BCC is a locally invasive skin tumour. The main characteristics are:  Slowly growing plaque or nodule   Skin coloured, pink or pigmented   Varies in size from a few millimetres to several centimetres in diameter   Spontaneous bleeding or ulceration  BCC is very rarely a threat to life. A tiny proportion of BCCs grow rapidly, invade deeply, and/or metastasise to local lymph nodes.    Types of basal cell carcinoma  There are several distinct clinical types of BCC, and over 20 histological growth patterns of BCC.  Nodular BCC  Most common type of facial BCC   Shiny or pearly nodule with a smooth surface   May have central depression or ulceration, so its edges appear rolled   Blood vessels cross its surface   Cystic variant is soft, with jelly-like contents   Micronodular, microcystic and infiltrative types are potentially aggressive subtypes   Also known as nodulocystic carcinoma  Superficial BCC  Most common type in younger adults   Most common type on upper trunk and shoulders   Slightly scaly, irregular plaque   Thin, translucent rolled border   Multiple microerosions  Morphoeaform BCC  Usually found in mid-facial sites   Waxy, scar-like plaque with indistinct borders   Wide and deep subclinical extension   May infiltrate cutaneous nerves (perineural spread)   Also known as morpheic, morphoeiform or sclerosing BCC  Basosquamous carcinoma  Mixed basal cell carcinoma (BCC) and squamous cell carcinoma (SCC)   Infiltrative growth pattern   Potentially more aggressive than other forms of BCC   Also known as basisquamous carcinoma and mixed basal-squamous cell carcinoma       Complications of basal cell carcinoma    Recurrent BCC  Recurrence of BCC after initial treatment is not uncommon. Characteristics of recurrent BCC often include:  Incomplete excision or narrow margins at primary excision   Morphoeic,  micronodular, and infiltrative subtypes   Location on head and neck    Advanced BCC  Advanced BCCs are large, often neglected tumours.  They may be several centimetres in diameter   They may be deeply infiltrating into tissues below the skin   They are difficult or impossible to treat surgically    Metastatic BCC  Very rare   Primary tumour is often large, neglected or recurrent, located on head and neck, with aggressive subtype   May have had multiple prior treatments   May arise in site exposed to ionising radiation   Can be fatal    How is basal cell carcinoma diagnosed?  BCC is diagnosed clinically by the presence of a slowly enlarging skin lesion with typical appearance. The diagnosis and  by a diagnostic biopsy or following excision.  Some typical superficial BCCs on trunk and limbs are clinically diagnosed and have non-surgical treatment without histology.    What is the treatment for primary basal cell carcinoma?  The treatment for a BCC depends on its type, size and location, the number to be treated, patient factors, and the preference or expertise of the doctor. Most BCCs are treated surgically. Long-term follow-up is recommended to check for new lesions and recurrence; the latter may be unnecessary if histology has reported wide clear margins.    Excision biopsy  Excision means the lesion is cut out and the skin stitched up.  Most appropriate treatment for nodular, infiltrative and morphoeic BCCs   Should include 3 to 5 mm margin of normal skin around the tumour   Very large lesions may require flap or skin graft to repair the defect   Pathologist will report deep and lateral margins   Further surgery is recommended for lesions that are incompletely excised    Mohs micrographically controlled excision  Mohs micrographically controlled surgery involves examining carefully marked excised tissue under the microscope, layer by layer, to ensure complete excision.  Very high cure rates achieved by trained Mohs  surgeons   Used in high-risk areas of the face around eyes, lips and nose   Suitable for ill-defined, morphoeic, infiltrative and recurrent subtypes   Large defects are repaired by flap or skin graft    Superficial skin surgery  Superficial skin surgery comprises shave, curettage, and electrocautery. It is a rapid technique using local anaesthesia and does not require sutures.  Suitable for small, well-defined nodular or superficial BCCs   Lesions are usually located on trunk or limbs   Wound is left open to heal by secondary intention   Moist wound dressings lead to healing within a few weeks   Eventual scar quality variable    Cryotherapy  Cryotherapy is the treatment of a superficial skin lesion by freezing it, usually with liquid nitrogen.  Suitable for small superficial BCCs on covered areas of trunk and limbs   Best avoided for BCCs on head and neck, and distal to knees   Double freeze-thaw technique   Results in a blister that crusts over and heals within several weeks.   Leaves permanent white nii    Photodynamic therapy  Photodynamic therapy (PDT) refers to a technique in which BCC is treated with a photosensitising chemical, and exposed to light several hours later.  Topical photosensitisers include aminolevulinic acid lotion and methyl aminolevulinate cream   Suitable for low-risk small, superficial BCCs   Best avoided if tumour in site at high risk of recurrence   Results in inflammatory reaction, maximal 3-4 days after procedure   Treatment repeated 7 days after initial treatment   Excellent cosmetic results    Imiquimod cream  Imiquimod is an immune response modifier.  Best used for superficial BCCs less than 2 cm diameter   Applied three to five times each week, for 6-16 weeks   Results in a variable inflammatory reaction, maximal at three weeks   Minimal scarring is usual    Fluorouracil cream  5-Fluorouracil cream is a topical cytotoxic agent.  Used to treat small superficial basal cell carcinomas    Requires prolonged course, eg twice daily for 6-12 weeks   Causes inflammatory reaction   Has high recurrence rates    Radiotherapy  Radiotherapy or X-ray treatment can be used to treat primary BCCs or as adjunctive treatment if margins are incomplete.  Mainly used if surgery is not suitable   Best avoided in young patients and in genetic conditions predisposing to skin cancer   Best cosmetic results achieved using multiple fractions   Typically, patient attends once-weekly for several weeks   Causes inflammatory reaction followed by scar   Risk of radiodermatitis, late recurrence, and new tumours    What is the treatment for advanced or metastatic basal cell carcinoma?  Locally advanced primary, recurrent or metastatic BCC requires multidisciplinary consultation. Often a combination of treatments is used.  Surgery   Radiotherapy   Targeted therapy  Targeted therapy refers to the hedgehog signalling pathway inhibitors, vismodegib and sonidegib. These drugs have some important risks and side effects.    How can basal cell carcinoma be prevented?  The most important way to prevent BCC is to avoid sunburn. This is especially important in childhood and early life. Fair skinned individuals and those with a personal or family history of BCC should protect their skin from sun exposure daily, year-round and lifelong.  Stay indoors or under the shade in the middle of the day   Wear covering clothing   Apply high protection factor SPF50+ broad-spectrum sunscreens generously to exposed skin if outdoors   Avoid indoor tanning (sun beds, solaria)  Oral nicotinamide (vitamin B3) in a dose of 500 mg twice daily may reduce the number and severity of BCCs.    What is the outlook for basal cell carcinoma?  Most BCCs are cured by treatment. Cure is most likely if treatment is undertaken when the lesion is small.  About 50% of people with BCC develop a second one within 3 years of the first. They are also at increased risk of other  skin cancers, especially melanoma. Regular self-skin examinations and long-term annual skin checks by an experienced health professional are recommended.  SQUAMOUS CELL CARCINOMA    What is cutaneous squamous cell carcinoma?  Cutaneous squamous cell carcinoma (SCC) is a common type of keratinocyte or non-melanoma skin cancer. It is derived from cells within the epidermis that make keratin -- the horny protein that makes up skin, hair and nails.  Cutaneous SCC is an invasive disease, referring to cancer cells that have grown beyond the epidermis. SCC can sometimes metastasise and may prove fatal.  Intraepidermal carcinoma (cutaneous SCC in situ) and mucosal SCC are considered elsewhere.    Who gets cutaneous squamous cell carcinoma?  Risk factors for cutaneous SCC include:  Age and sex: SCCs are particularly prevalent in elderly males. However, they also affect females and younger adults.   Previous SCC or another form of skin cancer (basal cell carcinoma, melanoma) are a strong predictor for further skin cancers.   Actinic keratoses   Outdoor occupation or recreation   Smoking   Fair skin, blue eyes and blond or red hair   Previous cutaneous injury, thermal burn, disease (eg cutaneous lupus, epidermolysis bullosa, leg ulcer)   Inherited syndromes: SCC is a particular problem for families with xeroderma pigmentosum and albinism   Other risk factors include ionising radiation, exposure to arsenic, and immune suppression due to disease (eg chronic lymphocytic leukaemia) or medicines. Organ transplant recipients have a massively increased risk of developing SCC.    What causes cutaneous squamous cell carcinoma?  More than 90% of cases of SCC are associated with numerous DNA mutations in multiple somatic genes. Mutations in the p53 tumour suppressor gene are caused by exposure to ultraviolet radiation (UV), especially UVB (known as signature 7). Other signature mutations relate to cigarette smoking, ageing and immune  suppression (eg, to drugs such as azathioprine). Mutations in signalling pathways affect the epidermal growth factor receptor, CHICHI, Fyn, and [id number] signalling.   Beta-genus human papillomaviruses (wart virus) are thought to play a role in SCC arising in immune-suppressed populations. ?-HPV and HPV subtypes 5, 8, 17, 20, 24, and 38 have also been associated with an increased risk of cutaneous SCC in immunocompetent individuals.     What are the clinical features of cutaneous squamous cell carcinoma?  Cutaneous SCCs present as enlarging scaly or crusted lumps. They usually arise within pre-existing actinic keratosis or intraepidermal carcinoma.  They grow over weeks to months   They may ulcerate   They are often tender or painful   Located on sun-exposed sites, particularly the face, lips, ears, hands, forearms and lower legs   Size varies from a few millimetres to several centimetres in diameter.    Types of cutaneous squamous cell carcinoma  Distinct clinical types of invasive cutaneous SCC include:  Cutaneous horn -- the horn is due to excessive production of keratin   Keratoacanthoma (KA) -- a rapidly growing keratinising nodule that may resolve without treatment   Carcinoma cuniculatum (‘verrucous carcinoma’), a slow-growing, warty tumour on the sole of the foot.   Multiple eruptive SCC/KA-like lesions arising in syndromes, such as multiple self-healing squamous epitheliomas of George-Smith and Grzybowski syndrome  The pathologist may classify a tumour as well differentiated, moderately well differentiated, poorly differentiated or anaplastic cutaneous SCC. There are other variants.    Classification of squamous cell carcinoma by risk  Cutaneous SCC is classified as low-risk or high-risk, depending on the chance of tumour recurrence and metastasis. Characteristics of high-risk SCC include:  High-risk cutaneous squamous cell carcinoma has the following characteristics:  Diameter greater than or equal to 2 cm    Location on the ear, vermilion of the lip, central face, hands, feet, genitalia   Arising in elderly or immune suppressed patient   Histological thickness greater than 2 mm, poorly differentiated histology, or with the invasion of the subcutaneous tissue, nerves and blood vessels  Metastatic SCC is found in regional lymph nodes (80%), lungs, liver, brain, bones and skin.    Staging cutaneous squamous cell carcinoma  In 2011, the American Joint Committee on Cancer (AJCC) published a new staging systemic for cutaneous SCC for the 7th Edition of the AJCC manual. This evaluates the dimensions of the original primary tumour (T) and its metastases to lymph nodes (N).    Tumour staging for cutaneous SCC  TX: Th Primary tumour cannot be assessed  T0: No evidence of a primary tumour  Tis: Carcinoma in situ  T1: Tumour ? 2cm without high-risk features  T2: Tumour ? 2cm; or; Tumour ? 2 cm with high-risk features  T3: Tumour with the invasion of maxilla, mandible, orbit or temporal bone  T4: Tumour with the invasion of axial or appendicular skeleton or perineural invasion of skull base    Kristi staging for cutaneous SCC  NX: Regional lymph nodes cannot be assessed  N0: No regional lymph node metastasis  N1: Metastasis in one local lymph node ? 3cm  N2: Metastasis in one local lymph node ? 3cm; or; Metastasis in >1 local lymph node ? 6cm  N3: Metastasis in lymph node ? 6cm    How is squamous cell carcinoma diagnosed?  Diagnosis of cutaneous SCC is based on clinical features. The diagnosis and histological subtype are confirmed pathologically by diagnostic biopsy or following excision. See squamous cell carcinoma - pathology.  Patients with high-risk SCC may also undergo staging investigations to determine whether it has spread to lymph nodes or elsewhere. These may include:  Imaging using ultrasound scan, X-rays, CT scans, MRI scans   Lymph node or other tissue biopsies    What is the treatment for cutaneous squamous cell  carcinoma?  Cutaneous SCC is nearly always treated surgically. Most cases are excised with a 3-10 mm margin of normal tissue around a visible tumour. A flap or skin graft may be needed to repair the defect.  Other methods of removal include:  Shave, curettage, and electrocautery for low-risk tumours on trunk and limbs   Aggressive cryotherapy for very small, thin, low-risk tumours   Mohs micrographic surgery for large facial lesions with indistinct margins or recurrent tumours   Radiotherapy for an inoperable tumour, patients unsuitable for surgery, or as adjuvant    What is the treatment for advanced or metastatic squamous cell carcinoma?  Locally advanced primary, recurrent or metastatic SCC requires multidisciplinary consultation. Often a combination of treatments is used.  Surgery   Radiotherapy   Cemiplimab   Experimental targeted therapy using epidermal growth factor receptor inhibitors    How can cutaneous squamous cell carcinoma be prevented?  There is a great deal of evidence to show that very careful sun protection at any time of life reduces the number of SCCs. This is particularly important in ageing, sun-damaged, fair skin; in patients that are immune suppressed; and in those who already have actinic keratoses or previous SCC.  Stay indoors or under the shade in the middle of the day   Wear covering clothing   Apply high protection factor SPF50+ broad-spectrum sunscreens generously to exposed skin if outdoors   Avoid indoor tanning (sun beds, solaria)    Oral nicotinamide (vitamin B3) in a dose of 500 mg twice daily may reduce the number and severity of SCCs in people at high risk.  Patients with multiple squamous cell carcinomas may be prescribed an oral retinoid (acitretin or isotretinoin). These reduce the number of tumours but have some nuisance side effects.    What is the outlook for cutaneous squamous cell carcinoma?  Most SCCs are cured by treatment. A cure is most likely if treatment is  "undertaken when the lesion is small. The risk of recurrence or disease-associated death is greater for tumours that are > 20 mm in diameter and/or > 2 mm in thickness at the time of surgical excision.  About 50% of people at high risk of SCC develop a second one within 5 years of the first. They are also at increased risk of other skin cancers, especially melanoma. Regular self-skin examinations and long-term annual skin checks by an experienced health professional are recommended.  MELANOCYTIC NEVI (\"Moles\")    Paste patient specific assessment and plan here *    Melanocytic nevi (\"moles\") are tan or brown, raised or flat areas of the skin which have an increased number of melanocytes. Melanocytes are the cells in our body which make pigment and account for skin color.    Some moles are present at birth (I.e., \"congenital nevi\"), while others come up later in life (i.e., \"acquired nevi\").  The sun can stimulate the body to make more moles.  Sunburns are not the only thing that triggers more moles.  Chronic sun exposure can do it too.     Clinically distinguishing a healthy mole from melanoma may be difficult, even for experienced dermatologists. The \"ABCDE's\" of moles have been suggested as a means of helping to alert a person to a suspicious mole and the possible increased risk of melanoma.  The suggestions for raising alert are as follows:    Asymmetry: Healthy moles tend to be symmetric, while melanomas are often asymmetric.  Asymmetry means if you draw a line through the mole, the two halves do not match in color, size, shape, or surface texture. Asymmetry can be a result of rapid enlargement of a mole, the development of a raised area on a previously flat lesion, scaling, ulceration, bleeding or scabbing within the mole.  Any mole that starts to demonstrate \"asymmetry\" should be examined promptly by a board certified dermatologist.     Border: Healthy moles tend to have discrete, even borders.  The border of a " "melanoma often blends into the normal skin and does not sharply delineate the mole from normal skin.  Any mole that starts to demonstrate \"uneven borders\" should be examined promptly by a board certified dermatologist.     Color: Healthy moles tend to be one color throughout.  Melanomas tend to be made up of different colors ranging from dark black, blue, white, or red.  Any mole that demonstrates a color change should be examined promptly by a board certified dermatologist.     Diameter: Healthy moles tend to be smaller than 0.6 cm in size; an exception are \"congenital nevi\" that can be larger.  Melanomas tend to grow and can often be greater than 0.6 cm (1/4 of an inch, or the size of a pencil eraser). This is only a guideline, and many normal moles may be larger than 0.6 cm without being unhealthy.  Any mole that starts to change in size (small to bigger or bigger to smaller) should be examined promptly by a board certified dermatologist.     Evolving: Healthy moles tend to \"stay the same.\"  Melanomas may often show signs of change or evolution such as a change in size, shape, color, or elevation.  Any mole that starts to itch, bleed, crust, burn, hurt, or ulcerate or demonstrate a change or evolution should be examined promptly by a board certified dermatologist.      Dysplastic Nevi  Dysplastic moles are moles that fit the ABCDE rules of melanoma but are not identified as melanomas when examined under the microscope.  They may indicate an increased risk of melanoma in that person. If there is a family history of melanoma, most experts agree that the person may be at an increased risk for developing a melanoma.  Experts still do not agree on what dysplastic moles mean in patients without a personal or family history of melanoma.  Dysplastic moles are usually larger than common moles and have different colors within it with irregular borders. The appearance can be very similar to a melanoma. Biopsies of " "dysplastic moles may show abnormalities which are different from a regular mole.      Melanoma  Malignant melanoma is a type of skin cancer that can be deadly if it spreads throughout the body. The incidence of melanoma in the [place] is growing faster than any other cancer. Melanoma usually grows near the surface of the skin for a period of time, and then begins to grow deeper into the skin. Once it grows deeper into the skin, the risk of spread to other organs greatly increases. Therefore, early detection and removal of a malignant melanoma may result in a better chance at a complete cure; removal after the tumor has spread may not be as effective, leading to worse clinical outcomes such as death.    The true rate of nevus transformation into a melanoma is unknown. It has been estimated that the lifetime risk for any acquired melanocytic nevus on any 20-year-old individual transforming into melanoma by age 80 is 0.03% (1 in 3,164) for men and 0.009% (1 in 10,800) for women.     The appearance of a \"new mole\" remains one of the most reliable methods for identifying a malignant melanoma.  Occasionally, melanomas appear as rapidly growing, blue-black, dome-shaped bumps within a previous mole or previous area of normal skin.  Other times, melanomas are suspected when a mole suddenly appears or changes. Itching, burning, or pain in a pigmented lesion should increase suspicion, but most patients with early melanoma have no skin discomfort whatsoever.  Melanoma can occur anywhere on the skin, including areas that are difficult for self-examination. Many melanomas are first noticed by other family members.  Suspicious-looking moles may be removed for microscopic examination.       You may be able to prevent death from melanoma by doing two simple things:    Try to avoid unnecessary sun exposure and protect your skin when it is exposed to the sun.  People who live near the equator, people who have intermittent " "exposures to large amounts of sun, and people who have had sunburns in childhood or adolescence have an increased risk for melanoma. Sun sense and vigilant sun protection may be keys to helping to prevent melanoma.  We recommend wearing UPF-rated sun protective clothing and sunglasses whenever possible and applying a moisturizer-sunscreen combination product (SPF 50+) such as Neutrogena Daily Defense to sun exposed areas of skin at least three times a day.    Have your moles regularly examined by a board certified dermatologist AND by yourself or a family member/friend at home.  We recommend that you have your moles examined at least once a year by a board certified dermatologist.  Use your birthday as an annual reminder to have your \"Birthday Suit\" (I.e., your skin) examined; it is a nice birthday gift to yourself to know that your skin is healthy appearing!  Additionally, at-home self examinations may be helpful for detecting a possible melanoma.  Use the ABCDEs we discussed and check your moles once a month at home.        SEBORRHEIC KERATOSIS  A seborrheic keratosis is a harmless warty spot that appears during adult life as a common sign of skin aging.  Seborrheic keratoses can arise on any area of skin, covered or uncovered, with the usual exception of the palms and soles. They do not arise from mucous membranes. Seborrheic keratoses can have highly variable appearance.      Seborrheic keratoses are extremely common. It has been estimated that over 90% of adults over the age of 60 years have one or more of them. They occur in males and females of all races, typically beginning to erupt in the 30s or 40s. They are uncommon under the age of 20 years.  The precise cause of seborrhoeic keratoses is not known.  Seborrhoeic keratoses are considered degenerative in nature. As time goes by, seborrheic keratoses tend to become more numerous. Some people inherit a tendency to develop a very large number of them; some " "people may have hundreds of them.    The name \"seborrheic keratosis\" is misleading, because these lesions are not limited to a seborrhoeic distribution (scalp, mid-face, chest, upper back), nor are they formed from sebaceous glands, nor are they associated with sebum -- which is greasy.  Seborrheic keratosis may also be called \"SK,\" \"Seb K,\" \"basal cell papilloma,\" \"senile wart,\" or \"barnacle.\"      There is no easy way to remove multiple lesions on a single occasion.  Unless a specific lesion is \"inflamed\" and is causing pain or stinging/burning or is bleeding, most insurance companies do not authorize treatment.         "

## 2024-07-10 ENCOUNTER — TELEPHONE (OUTPATIENT)
Age: 74
End: 2024-07-10

## 2024-07-10 NOTE — TELEPHONE ENCOUNTER
Lvalexander munoz  can pt come in 7/11/24 @ 10:30 ?  Dr turner is starting lil late that day  If pt call and conf please   Put it on appt notes only don't move the pt. From his appt spot  per   Cecily

## 2024-07-11 ENCOUNTER — OFFICE VISIT (OUTPATIENT)
Age: 74
End: 2024-07-11
Payer: MEDICARE

## 2024-07-11 DIAGNOSIS — L82.1 SEBORRHEIC KERATOSIS: ICD-10-CM

## 2024-07-11 DIAGNOSIS — D18.01 CHERRY ANGIOMA: ICD-10-CM

## 2024-07-11 DIAGNOSIS — Z85.828 HISTORY OF SKIN CANCER: Primary | ICD-10-CM

## 2024-07-11 DIAGNOSIS — Z13.89 SCREENING FOR SKIN CONDITION: ICD-10-CM

## 2024-07-11 DIAGNOSIS — D22.9 NEVUS: ICD-10-CM

## 2024-07-11 PROCEDURE — 99213 OFFICE O/P EST LOW 20 MIN: CPT | Performed by: DERMATOLOGY

## 2024-07-11 NOTE — PATIENT INSTRUCTIONS
"MELANOCYTIC NEVI (\"Moles\")    Paste patient specific assessment and plan here *    Melanocytic nevi (\"moles\") are tan or brown, raised or flat areas of the skin which have an increased number of melanocytes. Melanocytes are the cells in our body which make pigment and account for skin color.    Some moles are present at birth (I.e., \"congenital nevi\"), while others come up later in life (i.e., \"acquired nevi\").  The sun can stimulate the body to make more moles.  Sunburns are not the only thing that triggers more moles.  Chronic sun exposure can do it too.     Clinically distinguishing a healthy mole from melanoma may be difficult, even for experienced dermatologists. The \"ABCDE's\" of moles have been suggested as a means of helping to alert a person to a suspicious mole and the possible increased risk of melanoma.  The suggestions for raising alert are as follows:    Asymmetry: Healthy moles tend to be symmetric, while melanomas are often asymmetric.  Asymmetry means if you draw a line through the mole, the two halves do not match in color, size, shape, or surface texture. Asymmetry can be a result of rapid enlargement of a mole, the development of a raised area on a previously flat lesion, scaling, ulceration, bleeding or scabbing within the mole.  Any mole that starts to demonstrate \"asymmetry\" should be examined promptly by a board certified dermatologist.     Border: Healthy moles tend to have discrete, even borders.  The border of a melanoma often blends into the normal skin and does not sharply delineate the mole from normal skin.  Any mole that starts to demonstrate \"uneven borders\" should be examined promptly by a board certified dermatologist.     Color: Healthy moles tend to be one color throughout.  Melanomas tend to be made up of different colors ranging from dark black, blue, white, or red.  Any mole that demonstrates a color change should be examined promptly by a board certified dermatologist. " "    Diameter: Healthy moles tend to be smaller than 0.6 cm in size; an exception are \"congenital nevi\" that can be larger.  Melanomas tend to grow and can often be greater than 0.6 cm (1/4 of an inch, or the size of a pencil eraser). This is only a guideline, and many normal moles may be larger than 0.6 cm without being unhealthy.  Any mole that starts to change in size (small to bigger or bigger to smaller) should be examined promptly by a board certified dermatologist.     Evolving: Healthy moles tend to \"stay the same.\"  Melanomas may often show signs of change or evolution such as a change in size, shape, color, or elevation.  Any mole that starts to itch, bleed, crust, burn, hurt, or ulcerate or demonstrate a change or evolution should be examined promptly by a board certified dermatologist.      Dysplastic Nevi  Dysplastic moles are moles that fit the ABCDE rules of melanoma but are not identified as melanomas when examined under the microscope.  They may indicate an increased risk of melanoma in that person. If there is a family history of melanoma, most experts agree that the person may be at an increased risk for developing a melanoma.  Experts still do not agree on what dysplastic moles mean in patients without a personal or family history of melanoma.  Dysplastic moles are usually larger than common moles and have different colors within it with irregular borders. The appearance can be very similar to a melanoma. Biopsies of dysplastic moles may show abnormalities which are different from a regular mole.      Melanoma  Malignant melanoma is a type of skin cancer that can be deadly if it spreads throughout the body. The incidence of melanoma in the [place] is growing faster than any other cancer. Melanoma usually grows near the surface of the skin for a period of time, and then begins to grow deeper into the skin. Once it grows deeper into the skin, the risk of spread to other organs greatly " "increases. Therefore, early detection and removal of a malignant melanoma may result in a better chance at a complete cure; removal after the tumor has spread may not be as effective, leading to worse clinical outcomes such as death.    The true rate of nevus transformation into a melanoma is unknown. It has been estimated that the lifetime risk for any acquired melanocytic nevus on any 20-year-old individual transforming into melanoma by age 80 is 0.03% (1 in 3,164) for men and 0.009% (1 in 10,800) for women.     The appearance of a \"new mole\" remains one of the most reliable methods for identifying a malignant melanoma.  Occasionally, melanomas appear as rapidly growing, blue-black, dome-shaped bumps within a previous mole or previous area of normal skin.  Other times, melanomas are suspected when a mole suddenly appears or changes. Itching, burning, or pain in a pigmented lesion should increase suspicion, but most patients with early melanoma have no skin discomfort whatsoever.  Melanoma can occur anywhere on the skin, including areas that are difficult for self-examination. Many melanomas are first noticed by other family members.  Suspicious-looking moles may be removed for microscopic examination.       You may be able to prevent death from melanoma by doing two simple things:    Try to avoid unnecessary sun exposure and protect your skin when it is exposed to the sun.  People who live near the equator, people who have intermittent exposures to large amounts of sun, and people who have had sunburns in childhood or adolescence have an increased risk for melanoma. Sun sense and vigilant sun protection may be keys to helping to prevent melanoma.  We recommend wearing UPF-rated sun protective clothing and sunglasses whenever possible and applying a moisturizer-sunscreen combination product (SPF 50+) such as Neutrogena Daily Defense to sun exposed areas of skin at least three times a day.    Have your moles " "regularly examined by a board certified dermatologist AND by yourself or a family member/friend at home.  We recommend that you have your moles examined at least once a year by a board certified dermatologist.  Use your birthday as an annual reminder to have your \"Birthday Suit\" (I.e., your skin) examined; it is a nice birthday gift to yourself to know that your skin is healthy appearing!  Additionally, at-home self examinations may be helpful for detecting a possible melanoma.  Use the ABCDEs we discussed and check your moles once a month at home.        SEBORRHEIC KERATOSIS  A seborrheic keratosis is a harmless warty spot that appears during adult life as a common sign of skin aging.  Seborrheic keratoses can arise on any area of skin, covered or uncovered, with the usual exception of the palms and soles. They do not arise from mucous membranes. Seborrheic keratoses can have highly variable appearance.      Seborrheic keratoses are extremely common. It has been estimated that over 90% of adults over the age of 60 years have one or more of them. They occur in males and females of all races, typically beginning to erupt in the 30s or 40s. They are uncommon under the age of 20 years.  The precise cause of seborrhoeic keratoses is not known.  Seborrhoeic keratoses are considered degenerative in nature. As time goes by, seborrheic keratoses tend to become more numerous. Some people inherit a tendency to develop a very large number of them; some people may have hundreds of them.    The name \"seborrheic keratosis\" is misleading, because these lesions are not limited to a seborrhoeic distribution (scalp, mid-face, chest, upper back), nor are they formed from sebaceous glands, nor are they associated with sebum -- which is greasy.  Seborrheic keratosis may also be called \"SK,\" \"Seb K,\" \"basal cell papilloma,\" \"senile wart,\" or \"barnacle.\"      There is no easy way to remove multiple lesions on a single occasion.  Unless a " "specific lesion is \"inflamed\" and is causing pain or stinging/burning or is bleeding, most insurance companies do not authorize treatment.      ANGIOMA (\"CHERRY ANGIOMA\")  Jane angiomas markedly increase in number from about the age of 40, so it has been estimated that 75% of people over 75 years of age have them. Although they also called \"senile angiomas,\" they can occur in young people too - 5% of adolescents have been found to have them.     Cherry angiomas are very common in males and females of any age or race, with no difference in sexes or races affected. They are however more noticeable in white skin than in skin of colour.  There may be a family history of similar lesions. Eruptive (very large number appearing in a short period of time) cherry angiomas have been rarely reported to be associated with internal malignancy and pregnancy.   "

## 2024-07-11 NOTE — PROGRESS NOTES
"[facility] Note     Patient Name: Miguel Poon  Encounter Date: 07/11/2024     Have you been cared for by a [facility] Dermatologist in the last 3 years and, if so, which description applies to you?    Yes.  I have been here within the last 3 years, and my medical history has NOT changed since that time.  I am MALE/not capable of bearing children.    REVIEW OF SYSTEMS:  Have you recently had or currently have any of the following? No changes in my recent health.   PAST MEDICAL HISTORY:  Have you personally ever had or currently have any of the following?  If \"YES,\" then please provide more detail. No changes in my medical history.   HISTORY OF IMMUNOSUPPRESSION: Do you have a history of any of the following:  Systemic Immunosuppression such as Diabetes, Biologic or Immunotherapy, Chemotherapy, Organ Transplantation, Bone Marrow Transplantation?  No     Answering \"YES\" requires the addition of the dotphrase \"IMMUNOSUPPRESSED\" as the first diagnosis of the patient's visit.   FAMILY HISTORY:  Any \"first degree relatives\" (parent, brother, sister, or child) with the following?    No changes in my family's known health.   PATIENT EXPERIENCE:    Do you want the Dermatologist to perform a COMPLETE skin exam today including a clinical examination under the \"bra and underwear\" areas?  Yes  If necessary, do we have your permission to call and leave a detailed message on your Preferred Phone number that includes your specific medical information?  Yes      No Known Allergies   Current Outpatient Medications:     amLODIPine (NORVASC) 10 mg tablet, Take 10 mg by mouth daily, Disp: , Rfl:     apixaban (ELIQUIS) 5 mg, 2 (two) times a day, Disp: , Rfl:     B-D UF III MINI PEN NEEDLES 31G X 5 MM MISC, , Disp: , Rfl:     clopidogrel (PLAVIX) 75 mg tablet, TAKE 1 TABLET DAILY, Disp: 90 tablet, Rfl: 3    Continuous Blood Gluc  (Siving Egil Kvalebergyle Kathya 14 Day [place]) DIOGO, Use 1 each every 14 (fourteen) days, Disp: " , Rfl:     ergocalciferol (ERGOCALCIFEROL) 1.25 MG (44446 UT) capsule, Take 5,000 Units by mouth daily, Disp: , Rfl:     fluticasone (FLONASE) 50 mcg/act nasal spray, , Disp: , Rfl:     FREESTYLE LITE test strip, , Disp: , Rfl:     glimepiride (AMARYL) 2 mg tablet, Take 2 mg by mouth, Disp: , Rfl:     insulin aspart (NovoLOG) 100 units/mL injection, Inject 5 Units under the skin 3 (three) times a day with meals, Disp: , Rfl:     insulin detemir (LEVEMIR FLEXTOUCH) 100 Units/mL injection pen, INJECT 21 UNITS AT BEDTIME, Disp: , Rfl:     Jardiance 10 MG TABS, , Disp: , Rfl:     loratadine (CLARITIN) 10 mg tablet, , Disp: , Rfl:     metFORMIN (GLUCOPHAGE-XR) 750 mg 24 hr tablet, Take 750 mg by mouth daily with breakfast, Disp: , Rfl:     pantoprazole (PROTONIX) 40 mg tablet, Take 1 tablet (40 mg total) by mouth daily, Disp: 30 tablet, Rfl: 1    ramipril (ALTACE) 10 MG capsule, TAKE 1 CAPSULE TWICE A DAY, Disp: , Rfl:     Synjardy XR  MG TB24, Take 1 tablet by mouth daily with dinner, Disp: , Rfl:     aspirin (ECOTRIN LOW STRENGTH) 81 mg EC tablet, Take 1 tablet (81 mg total) by mouth daily, Disp: 30 tablet, Rfl: 0    atorvastatin (LIPITOR) 40 mg tablet, Take 1 tablet (40 mg total) by mouth every evening, Disp: 30 tablet, Rfl: 0    metoprolol succinate (TOPROL-XL) 25 mg 24 hr tablet, Take 1 tablet (25 mg total) by mouth daily, Disp: 30 tablet, Rfl: 0    Silodosin 8 MG CAPS, Take 8 mg by mouth daily, Disp: , Rfl:           Whom besides the patient is providing clinical information about today's encounter?   NO ADDITIONAL HISTORIAN (patient alone provided history)    Physical Exam and Assessment/Plan by Diagnosis:    Chief Complaint   Patient presents with    Follow-up     Skin exam, no concerns history Basal cell carcinoma    HISTORY OF BASAL CELL CARCINOMA     Physical Exam:  Anatomic Location Affected:  Left Post Auricular Ear 11/30/2021  Morphological Description of scar:  Well healed  Suspected Recurrence:  "No  Pertinent Positives:  Pertinent Negatives:        Additional History of Present Condition:  History of basal cell carcinoma with no sign of recurrence     Assessment and Plan:  Based on a thorough discussion of this condition and the management approach to it (including a comprehensive discussion of the known risks, side effects and potential benefits of treatment), the patient (family) agrees to implement the following specific plan:  Continue to monitor  MELANOCYTIC NEVI (\"Moles\")    Physical Exam:  Anatomic Location Affected: Mostly on sun-exposed areas of the trunk and extremities   Morphological Description:  Scattered, 1-4mm round to ovoid, symmetrical-appearing, even bordered, skin colored to dark brown macules/papules, mostly in sun-exposed areas  Pertinent Positives:  Pertinent Negatives:    Additional History of Present Condition:      Assessment and Plan:  Based on a thorough discussion of this condition and the management approach to it (including a comprehensive discussion of the known risks, side effects and potential benefits of treatment), the patient (family) agrees to implement the following specific plan:  Provided handout with information regarding the ABCDE's of moles   Recommend routine skin exams every year.   Sun avoidance, protective clothing (known as UPF clothing), and the use of at least SPF 30 sunscreens is advised. Sunscreen should be reapplied every two hours when outside.   SEBORRHEIC KERATOSIS; NON-INFLAMED    Physical Exam:  Anatomic Location Affected:  scattered across trunk, extremities,  face  Morphological Description:  Flat and raised, waxy, smooth to warty textured, yellow to brownish-grey to dark brown to blackish, discrete, \"stuck-on\" appearing papules.  Pertinent Positives:  Pertinent Negatives:    Additional History of Present Condition:  Patient reports new bumps on the skin.  Denies itch, burn, pain, bleeding or ulceration.  Present constantly; nothing seems to make " "it worse or better.  No prior treatment.      Assessment and Plan:  Based on a thorough discussion of this condition and the management approach to it (including a comprehensive discussion of the known risks, side effects and potential benefits of treatment), the patient (family) agrees to implement the following specific plan:  Reassured benign  ANGIOMA (\"CHERRY ANGIOMA\")    Physical Exam:  Anatomic Location: scattered across sun exposed areas of the trunk and extremities   Morphologic Description: Firm red to reddish-blue discrete papules  Pertinent Positives:  Pertinent Negatives:    Additional History of Present Condition:  Present on exam.    Assessment and Plan:  Reassured benign    Scribe Attestation      I,:  Breanne Thomas am acting as a scribe while in the presence of the attending physician.:       I,:  Mansoor Cardoso MD personally performed the services described in this documentation    as scribed in my presence.:                  "

## 2024-08-19 DIAGNOSIS — I65.09 VERTEBRAL ARTERY STENOSIS: ICD-10-CM

## 2024-09-25 RX ORDER — CLOPIDOGREL BISULFATE 75 MG/1
75 TABLET ORAL DAILY
Qty: 90 TABLET | Refills: 3 | Status: SHIPPED | OUTPATIENT
Start: 2024-09-25

## 2024-10-09 ENCOUNTER — OFFICE VISIT (OUTPATIENT)
Dept: NEUROLOGY | Facility: CLINIC | Age: 74
End: 2024-10-09
Payer: MEDICARE

## 2024-10-09 VITALS
HEART RATE: 59 BPM | BODY MASS INDEX: 25.26 KG/M2 | TEMPERATURE: 98 F | DIASTOLIC BLOOD PRESSURE: 66 MMHG | WEIGHT: 202.1 LBS | OXYGEN SATURATION: 98 % | SYSTOLIC BLOOD PRESSURE: 138 MMHG

## 2024-10-09 DIAGNOSIS — Z86.73 HISTORY OF STROKE: Primary | ICD-10-CM

## 2024-10-09 DIAGNOSIS — I65.09 VERTEBRAL ARTERY STENOSIS: ICD-10-CM

## 2024-10-09 PROCEDURE — 99214 OFFICE O/P EST MOD 30 MIN: CPT | Performed by: PHYSICIAN ASSISTANT

## 2024-10-09 NOTE — ASSESSMENT & PLAN NOTE
Pt follows with Vascular Surgery at [facility].  He will continue Plavix, Eliquis, and atorvastatin.

## 2024-10-09 NOTE — PROGRESS NOTES
Ambulatory Visit  Name: Miguel Poon      : 1950      MRN: [id number]  Encounter Provider: Tonya Flanagan PA-C  Encounter Date: 10/9/2024   Encounter department: [facility] [place]    Assessment & Plan  History of stroke  Pt denies any symptoms to suggest new stroke.  Pt should continue Plavix, Eliquis, and atorvastatin for secondary stroke prevention.  Continue with good blood pressure control; I would recommend monitoring at home at least 3 times per week; Goal of <130/80; at goal  Continue with good cholesterol control; Goal LDL <70; at goal  Continue with good blood sugar control; Goal HgbA1c <7.0; at goal  Pt was encouraged to get regular exercise and follow a Mediterranean diet.  If pt has any new stroke-like symptoms including sudden painless loss of vision or double vision, difficulty speaking or swallowing, vertigo / room spinning that does not quickly resolve, or weakness / numbness affecting 1 side of the face or body he should proceed by ambulance to the nearest emergency room immediately.  Pt will follow-up in 12 months.           Vertebral artery stenosis  Pt follows with Vascular Surgery at [facility].  He will continue Plavix, Eliquis, and atorvastatin.         History of Present Illness     Miguel Poon is a right handed 74 y.o. male, with HTN, HLD, DM, CAD s/p LAD stent, PAD and history tobacco use, who follows in the office due to a history of stroke. He is taking Plavix, Eliquis, and atorvastatin for secondary stroke prevention. He denies any symptoms to suggest new stroke and he does not have residual from his original stroke. He reports lightheadedness that occurs when he has been active for an extended period of time or when he stands up too quickly. He does try to follow a diabetic diet. He admits to cheating at times. He exercises by walking his dog at least daily. He had a back injury earlier in the year and attended PT. He follows the home exercise program  twice weekly for exercise as well. Lipid panel and A1C listed below and are a bit above goal.    Lipid panel: . LDL 77  HA1C: 7.2    I have personally reviewed the MA's review of systems and made changes as necessary.    Medical History Reviewed by provider this encounter:  Tobacco  Allergies  Meds  Problems  Med Hx  Surg Hx  Fam Hx       Objective     /66 (BP Location: Left arm, Patient Position: Sitting, Cuff Size: Adult)   Pulse 59   Temp 98 °F (36.7 °C) (Temporal)   Wt 91.7 kg (202 lb 1.6 oz)   SpO2 98%   BMI 25.26 kg/m²     Physical Exam  Vitals reviewed.   Constitutional:       Appearance: Normal appearance.   HENT:      Head: Normocephalic and atraumatic.      Nose: Nose normal.      Mouth/Throat:      Mouth: Mucous membranes are moist.   Eyes:      Extraocular Movements: Extraocular movements intact and EOM normal.      Conjunctiva/sclera: Conjunctivae normal.      Pupils: Pupils are equal, round, and reactive to light.   Pulmonary:      Effort: Pulmonary effort is normal.   Neurological:      Mental Status: He is alert and oriented to person, place, and time.      Motor: Motor strength is normal.     Gait: Gait is intact.      Deep Tendon Reflexes:      Reflex Scores:       Tricep reflexes are 1+ on the right side and 1+ on the left side.       Bicep reflexes are 1+ on the right side and 1+ on the left side.       Brachioradialis reflexes are 1+ on the right side and 1+ on the left side.       Patellar reflexes are 1+ on the right side and 1+ on the left side.       Achilles reflexes are 1+ on the right side and 1+ on the left side.  Psychiatric:         Speech: Speech normal.       Neurologic Exam     Mental Status   Oriented to person, place, and time.   Attention: normal. Concentration: normal.   Speech: speech is normal   Level of consciousness: alert  Knowledge: good.   Normal comprehension.     Cranial Nerves     CN III, IV, VI   Pupils are equal, round, and reactive to  light.  Extraocular motions are normal.   Right pupil: Shape: regular. Reactivity: brisk. Consensual response: intact. Accommodation: intact.   Left pupil: Shape: regular. Reactivity: brisk. Consensual response: intact. Accommodation: intact.   CN III: no CN III palsy  CN VI: no CN VI palsy  Nystagmus: none   Ophthalmoparesis: none  Upgaze: normal  Downgaze: normal  Conjugate gaze: present    CN V   Facial sensation intact.     CN VII   Facial expression full, symmetric.     CN IX, X   CN IX normal.     CN XI   CN XI normal.     CN XII   CN XII normal.     Motor Exam   Muscle bulk: normal  Overall muscle tone: normal    Strength   Strength 5/5 throughout.     Gait, Coordination, and Reflexes     Gait  Gait: normal    Tremor   Resting tremor: absent  Intention tremor: absent  Action tremor: absent    Reflexes   Right brachioradialis: 1+  Left brachioradialis: 1+  Right biceps: 1+  Left biceps: 1+  Right triceps: 1+  Left triceps: 1+  Right patellar: 1+  Left patellar: 1+  Right achilles: 1+  Left achilles: 1+      Results/Data:     No pertinent imaging studies reviewed.    Administrative Statements   I have spent a total time of 30 minutes in caring for this patient on the day of the visit/encounter including Prognosis, Risks and benefits of tx options, Instructions for management, Patient and family education, Importance of tx compliance, Risk factor reductions, Impressions, Counseling / Coordination of care, Documenting in the medical record, Reviewing / ordering tests, medicine, procedures  , and Obtaining or reviewing history  .

## 2024-10-09 NOTE — ASSESSMENT & PLAN NOTE
Pt denies any symptoms to suggest new stroke.  Pt should continue Plavix, Eliquis, and atorvastatin for secondary stroke prevention.  Continue with good blood pressure control; I would recommend monitoring at home at least 3 times per week; Goal of <130/80; at goal  Continue with good cholesterol control; Goal LDL <70; at goal  Continue with good blood sugar control; Goal HgbA1c <7.0; at goal  Pt was encouraged to get regular exercise and follow a Mediterranean diet.  If pt has any new stroke-like symptoms including sudden painless loss of vision or double vision, difficulty speaking or swallowing, vertigo / room spinning that does not quickly resolve, or weakness / numbness affecting 1 side of the face or body he should proceed by ambulance to the nearest emergency room immediately.  Pt will follow-up in 12 months.

## 2024-10-09 NOTE — PATIENT INSTRUCTIONS
History of stroke  Pt denies any symptoms to suggest new stroke.  Pt should continue Plavix, Eliquis, and atorvastatin for secondary stroke prevention.  Continue with good blood pressure control; I would recommend monitoring at home at least 3 times per week; Goal of <130/80; at goal  Continue with good cholesterol control; Goal LDL <70; at goal  Continue with good blood sugar control; Goal HgbA1c <7.0; at goal  Pt was encouraged to get regular exercise and follow a Mediterranean diet.  If pt has any new stroke-like symptoms including sudden painless loss of vision or double vision, difficulty speaking or swallowing, vertigo / room spinning that does not quickly resolve, or weakness / numbness affecting 1 side of the face or body he should proceed by ambulance to the nearest emergency room immediately.  Pt will follow-up in 12 months.             Vertebral artery stenosis  Pt follows with Vascular Surgery at [facility].  He will continue Plavix, Eliquis, and atorvastatin.

## 2024-12-04 ENCOUNTER — OFFICE VISIT (OUTPATIENT)
Age: 74
End: 2024-12-04
Payer: MEDICARE

## 2024-12-04 VITALS
BODY MASS INDEX: 24.74 KG/M2 | WEIGHT: 199 LBS | OXYGEN SATURATION: 98 % | TEMPERATURE: 98.2 F | HEIGHT: 75 IN | HEART RATE: 76 BPM | DIASTOLIC BLOOD PRESSURE: 60 MMHG | RESPIRATION RATE: 20 BRPM | SYSTOLIC BLOOD PRESSURE: 130 MMHG

## 2024-12-04 DIAGNOSIS — Z13.89 SCREENING FOR SKIN CONDITION: Primary | ICD-10-CM

## 2024-12-04 DIAGNOSIS — Z85.828 HISTORY OF SKIN CANCER: ICD-10-CM

## 2024-12-04 DIAGNOSIS — D48.9 NEOPLASM OF UNCERTAIN BEHAVIOR: ICD-10-CM

## 2024-12-04 PROCEDURE — 99214 OFFICE O/P EST MOD 30 MIN: CPT | Performed by: DERMATOLOGY

## 2024-12-04 PROCEDURE — 11102 TANGNTL BX SKIN SINGLE LES: CPT | Performed by: DERMATOLOGY

## 2024-12-04 PROCEDURE — 88305 TISSUE EXAM BY PATHOLOGIST: CPT | Performed by: PATHOLOGY

## 2024-12-04 NOTE — PATIENT INSTRUCTIONS
"MELANOCYTIC NEVI (\"Moles\")    Paste patient specific assessment and plan here *    Melanocytic nevi (\"moles\") are tan or brown, raised or flat areas of the skin which have an increased number of melanocytes. Melanocytes are the cells in our body which make pigment and account for skin color.    Some moles are present at birth (I.e., \"congenital nevi\"), while others come up later in life (i.e., \"acquired nevi\").  The sun can stimulate the body to make more moles.  Sunburns are not the only thing that triggers more moles.  Chronic sun exposure can do it too.     Clinically distinguishing a healthy mole from melanoma may be difficult, even for experienced dermatologists. The \"ABCDE's\" of moles have been suggested as a means of helping to alert a person to a suspicious mole and the possible increased risk of melanoma.  The suggestions for raising alert are as follows:    Asymmetry: Healthy moles tend to be symmetric, while melanomas are often asymmetric.  Asymmetry means if you draw a line through the mole, the two halves do not match in color, size, shape, or surface texture. Asymmetry can be a result of rapid enlargement of a mole, the development of a raised area on a previously flat lesion, scaling, ulceration, bleeding or scabbing within the mole.  Any mole that starts to demonstrate \"asymmetry\" should be examined promptly by a board certified dermatologist.     Border: Healthy moles tend to have discrete, even borders.  The border of a melanoma often blends into the normal skin and does not sharply delineate the mole from normal skin.  Any mole that starts to demonstrate \"uneven borders\" should be examined promptly by a board certified dermatologist.     Color: Healthy moles tend to be one color throughout.  Melanomas tend to be made up of different colors ranging from dark black, blue, white, or red.  Any mole that demonstrates a color change should be examined promptly by a board certified dermatologist. " "    Diameter: Healthy moles tend to be smaller than 0.6 cm in size; an exception are \"congenital nevi\" that can be larger.  Melanomas tend to grow and can often be greater than 0.6 cm (1/4 of an inch, or the size of a pencil eraser). This is only a guideline, and many normal moles may be larger than 0.6 cm without being unhealthy.  Any mole that starts to change in size (small to bigger or bigger to smaller) should be examined promptly by a board certified dermatologist.     Evolving: Healthy moles tend to \"stay the same.\"  Melanomas may often show signs of change or evolution such as a change in size, shape, color, or elevation.  Any mole that starts to itch, bleed, crust, burn, hurt, or ulcerate or demonstrate a change or evolution should be examined promptly by a board certified dermatologist.      Dysplastic Nevi  Dysplastic moles are moles that fit the ABCDE rules of melanoma but are not identified as melanomas when examined under the microscope.  They may indicate an increased risk of melanoma in that person. If there is a family history of melanoma, most experts agree that the person may be at an increased risk for developing a melanoma.  Experts still do not agree on what dysplastic moles mean in patients without a personal or family history of melanoma.  Dysplastic moles are usually larger than common moles and have different colors within it with irregular borders. The appearance can be very similar to a melanoma. Biopsies of dysplastic moles may show abnormalities which are different from a regular mole.      Melanoma  Malignant melanoma is a type of skin cancer that can be deadly if it spreads throughout the body. The incidence of melanoma in the [place] is growing faster than any other cancer. Melanoma usually grows near the surface of the skin for a period of time, and then begins to grow deeper into the skin. Once it grows deeper into the skin, the risk of spread to other organs greatly " "increases. Therefore, early detection and removal of a malignant melanoma may result in a better chance at a complete cure; removal after the tumor has spread may not be as effective, leading to worse clinical outcomes such as death.    The true rate of nevus transformation into a melanoma is unknown. It has been estimated that the lifetime risk for any acquired melanocytic nevus on any 20-year-old individual transforming into melanoma by age 80 is 0.03% (1 in 3,164) for men and 0.009% (1 in 10,800) for women.     The appearance of a \"new mole\" remains one of the most reliable methods for identifying a malignant melanoma.  Occasionally, melanomas appear as rapidly growing, blue-black, dome-shaped bumps within a previous mole or previous area of normal skin.  Other times, melanomas are suspected when a mole suddenly appears or changes. Itching, burning, or pain in a pigmented lesion should increase suspicion, but most patients with early melanoma have no skin discomfort whatsoever.  Melanoma can occur anywhere on the skin, including areas that are difficult for self-examination. Many melanomas are first noticed by other family members.  Suspicious-looking moles may be removed for microscopic examination.       You may be able to prevent death from melanoma by doing two simple things:    Try to avoid unnecessary sun exposure and protect your skin when it is exposed to the sun.  People who live near the equator, people who have intermittent exposures to large amounts of sun, and people who have had sunburns in childhood or adolescence have an increased risk for melanoma. Sun sense and vigilant sun protection may be keys to helping to prevent melanoma.  We recommend wearing UPF-rated sun protective clothing and sunglasses whenever possible and applying a moisturizer-sunscreen combination product (SPF 50+) such as Neutrogena Daily Defense to sun exposed areas of skin at least three times a day.    Have your moles " "regularly examined by a board certified dermatologist AND by yourself or a family member/friend at home.  We recommend that you have your moles examined at least once a year by a board certified dermatologist.  Use your birthday as an annual reminder to have your \"Birthday Suit\" (I.e., your skin) examined; it is a nice birthday gift to yourself to know that your skin is healthy appearing!  Additionally, at-home self examinations may be helpful for detecting a possible melanoma.  Use the ABCDEs we discussed and check your moles once a month at home.        SEBORRHEIC KERATOSIS  A seborrheic keratosis is a harmless warty spot that appears during adult life as a common sign of skin aging.  Seborrheic keratoses can arise on any area of skin, covered or uncovered, with the usual exception of the palms and soles. They do not arise from mucous membranes. Seborrheic keratoses can have highly variable appearance.      Seborrheic keratoses are extremely common. It has been estimated that over 90% of adults over the age of 60 years have one or more of them. They occur in males and females of all races, typically beginning to erupt in the 30s or 40s. They are uncommon under the age of 20 years.  The precise cause of seborrhoeic keratoses is not known.  Seborrhoeic keratoses are considered degenerative in nature. As time goes by, seborrheic keratoses tend to become more numerous. Some people inherit a tendency to develop a very large number of them; some people may have hundreds of them.    The name \"seborrheic keratosis\" is misleading, because these lesions are not limited to a seborrhoeic distribution (scalp, mid-face, chest, upper back), nor are they formed from sebaceous glands, nor are they associated with sebum -- which is greasy.  Seborrheic keratosis may also be called \"SK,\" \"Seb K,\" \"basal cell papilloma,\" \"senile wart,\" or \"barnacle.\"      There is no easy way to remove multiple lesions on a single occasion.  Unless a " "specific lesion is \"inflamed\" and is causing pain or stinging/burning or is bleeding, most insurance companies do not authorize treatment.      ANGIOMA (\"CHERRY ANGIOMA\")  Jane angiomas markedly increase in number from about the age of 40, so it has been estimated that 75% of people over 75 years of age have them. Although they also called \"senile angiomas,\" they can occur in young people too - 5% of adolescents have been found to have them.     Cherry angiomas are very common in males and females of any age or race, with no difference in sexes or races affected. They are however more noticeable in white skin than in skin of colour.  There may be a family history of similar lesions. Eruptive (very large number appearing in a short period of time) cherry angiomas have been rarely reported to be associated with internal malignancy and pregnancy.     Dr. Cardoso Shave/Punch Biopsy After Care Instructions      Remove bandage the next day. Keep bandage dry.    Shower or Bathe as usual the next day.    Cleanse the area once daily with saline or hydrogen peroxide.    Apply Vaseline.  WE ADVISE YOU NOT TO USE NEOSPORIN OR ANY TOPICAL ANTIBIOTIC UNLESS INSTRUCTED BY THE DOCTOR.    Cover area with a dressing or Band-Aid if possible.      Continue treatment until completely healed. (Skin appears in pink).    Try to avoid scab formation.      Slight bleeding may occur after the Band-Aid/Dressing is removed or the first few days after the procedure was done.      Don't panic!!  Apply continuous, direct pressure on the dressing over the wound for 15-20 minutes. DO NOT remove dressing.    HINT:  Set a timer for 15-20 minutes to make sure you press on the wound long enough  SOAKING: the dressing before you remove it should decrease chances of bleeding.  If the wound is on the legs or arms, swelling may occur. Elevating the arm or leg above the level of the heart as much as possible will also decrease swelling, promote healing " and decrease chances of bleeding.        ANY QUESTION PLEASE CALL OUR OFFICE AT [phone]  IF AFTER HOURS, THE ANSWERING SERVICE WILL GET A HOLD OF THE DOCTOR.    PLEASE BE ADVISED THAT BIOPSY RESULTS CAN TAKE UP TO 1 TO 2 WEEKS. [facility], BUT PLEASE WAIT FOR THE DOCTOR OR STAFF TO NOTIFY YOU.      THANK YOU!!

## 2024-12-04 NOTE — PROGRESS NOTES
"[facility] Note     Patient Name: Miguel Poon  Encounter Date: 12/04/2024     Have you been cared for by a [facility] Dermatologist in the last 3 years and, if so, which description applies to you?    Yes.  I have been here within the last 3 years, and my medical history has NOT changed since that time.  I am MALE/not capable of bearing children.    REVIEW OF SYSTEMS:  Have you recently had or currently have any of the following? No changes in my recent health.   PAST MEDICAL HISTORY:  Have you personally ever had or currently have any of the following?  If \"YES,\" then please provide more detail. No changes in my medical history.   HISTORY OF IMMUNOSUPPRESSION: Do you have a history of any of the following:  Systemic Immunosuppression such as Diabetes, Biologic or Immunotherapy, Chemotherapy, Organ Transplantation, Bone Marrow Transplantation or Prednisone?  No     Answering \"YES\" requires the addition of the dotphrase \"IMMUNOSUPPRESSED\" as the first diagnosis of the patient's visit.   FAMILY HISTORY:  Any \"first degree relatives\" (parent, brother, sister, or child) with the following?    No changes in my family's known health.   PATIENT EXPERIENCE:    Do you want the Dermatologist to perform a COMPLETE skin exam today including a clinical examination under the \"bra and underwear\" areas?  Yes  If necessary, do we have your permission to call and leave a detailed message on your Preferred Phone number that includes your specific medical information?  Yes      No Known Allergies   Current Outpatient Medications:     amLODIPine (NORVASC) 10 mg tablet, Take 10 mg by mouth daily, Disp: , Rfl:     apixaban (ELIQUIS) 5 mg, 2 (two) times a day, Disp: , Rfl:     B-D UF III MINI PEN NEEDLES 31G X 5 MM MISC, , Disp: , Rfl:     clopidogrel (PLAVIX) 75 mg tablet, TAKE 1 TABLET DAILY, Disp: 90 tablet, Rfl: 3    Continuous Blood Gluc  (FreeStyle Kathya 14 Day [place]) DIOGO, Use 1 each every 14 " "(fourteen) days, Disp: , Rfl:     ergocalciferol (ERGOCALCIFEROL) 1.25 MG (20240 UT) capsule, Take 5,000 Units by mouth daily, Disp: , Rfl:     fluticasone (FLONASE) 50 mcg/act nasal spray, , Disp: , Rfl:     FREESTYLE LITE test strip, , Disp: , Rfl:     glimepiride (AMARYL) 2 mg tablet, Take 2 mg by mouth, Disp: , Rfl:     insulin aspart (NovoLOG) 100 units/mL injection, Inject 5 Units under the skin 3 (three) times a day with meals, Disp: , Rfl:     insulin detemir (LEVEMIR FLEXTOUCH) 100 Units/mL injection pen, INJECT 21 UNITS AT BEDTIME, Disp: , Rfl:     Jardiance 10 MG TABS, , Disp: , Rfl:     loratadine (CLARITIN) 10 mg tablet, , Disp: , Rfl:     pantoprazole (PROTONIX) 40 mg tablet, Take 1 tablet (40 mg total) by mouth daily, Disp: 30 tablet, Rfl: 1    ramipril (ALTACE) 10 MG capsule, TAKE 1 CAPSULE TWICE A DAY, Disp: , Rfl:     atorvastatin (LIPITOR) 40 mg tablet, Take 1 tablet (40 mg total) by mouth every evening, Disp: 30 tablet, Rfl: 0    metoprolol succinate (TOPROL-XL) 25 mg 24 hr tablet, Take 1 tablet (25 mg total) by mouth daily, Disp: 30 tablet, Rfl: 0    Silodosin 8 MG CAPS, Take 8 mg by mouth daily, Disp: , Rfl:           Whom besides the patient is providing clinical information about today's encounter?   NO ADDITIONAL HISTORIAN (patient alone provided history)    Physical Exam and Assessment/Plan by Diagnosis:  Chief Complaint   Patient presents with    Follow-up     Spot of concern left temple    NEOPLASM OF UNCERTAIN BEHAVIOR OF SKIN    Physical Exam:  (Anatomic Location); (Size and Morphological Description); (Differential Diagnosis):  Specimen A, left temple,  12 mm pink keratotic nodule, inflamed keratosis r/o atypia ? Underlying cyst  Pertinent Positives:  Pertinent Negatives:    Additional History of Present Condition:  present for few month     Assessment and Plan:  I have discussed with the patient that a sample of skin via a \"skin biopsy” would be potentially helpful to further make a " specific diagnosis under the microscope.  Based on a thorough discussion of this condition and the management approach to it (including a comprehensive discussion of the known risks, side effects and potential benefits of treatment), the patient (family) agrees to implement the following specific plan:    Procedure:  Skin Biopsy.  After a thorough discussion of treatment options and risk/benefits/alternatives (including but not limited to local pain, scarring, dyspigmentation, blistering, possible superinfection, and inability to confirm a diagnosis via histopathology), verbal and written consent were obtained and portion of the rash was biopsied for tissue sample.  See below for consent that was obtained from patient and subsequent Procedure Note.          PROCEDURE TANGENTIAL (SHAVE) BIOPSY NOTE:    Performing Physician:   Anatomic Location; Clinical Description with size (cm); Pre-Op Diagnosis:   Specimen A, left temple,  12 mm pink keratotic nodule,  inflamed keratosis rule out atypia ? Underlying cyst  Post-op diagnosis: Same     Local anesthesia: 1% xylocaine with epi      Topical anesthesia: None    Hemostasis: Aluminum chloride       After obtaining informed consent  at which time there was a discussion about the purpose of biopsy  and low risks of infection and bleeding.  The area was prepped and draped in the usual fashion. Anesthesia was obtained with 1% lidocaine with epinephrine. A shave biopsy to an appropriate sampling depth was obtained by Shave (Dermablade or 15 blade) The resulting wound was covered with surgical ointment and bandaged appropriately.     The patient tolerated the procedure well without complications and was without signs of functional compromise.      Specimen has been sent for review by Dermatopathology.    Standard post-procedure care has been explained and has been included in written form within the patient's copy of Informed Consent.    INFORMED CONSENT DISCUSSION  "AND POST-OPERATIVE INSTRUCTIONS FOR PATIENT    I.  RATIONALE FOR PROCEDURE  I understand that a skin biopsy allows the Dermatologist to test a lesion or rash under the microscope to obtain a diagnosis.  It usually involves numbing the area with numbing medication and removing a small piece of skin; sometimes the area will be closed with sutures. In this specific procedure, sutures are not usually needed.  If any sutures are placed, then they are usually need to be removed in 2 weeks or less.    I understand that my Dermatologist recommends that a skin \"shave\" biopsy be performed today.  A local anesthetic, similar to the kind that a dentist uses when filling a cavity, will be injected with a very small needle into the skin area to be sampled.  The injected skin and tissue underneath \"will go to sleep” and become numb so no pain should be felt afterwards.  An instrument shaped like a tiny \"razor blade\" (shave biopsy instrument) will be used to cut a small piece of tissue and skin from the area so that a sample of tissue can be taken and examined more closely under the microscope.  A slight amount of bleeding will occur, but it will be stopped with direct pressure and a pressure bandage and any other appropriate methods.  I understands that a scar will form where the wound was created.  Surgical ointment will be applied to help protect the wound.  Sutures are not usually needed.    II.  RISKS AND POTENTIAL COMPLICATIONS   I understand the risks and potential complications of a skin biopsy include but are not limited to the following:  Bleeding  Infection  Pain  Scar/keloid  Skin discoloration  Incomplete Removal  Recurrence  Nerve Damage/Numbness/Loss of Function  Allergic Reaction to Anesthesia  Biopsies are diagnostic procedures and based on findings additional treatment or evaluation may be required  Loss or destruction of specimen resulting in no additional findings    My Dermatologist has explained to me the " "nature of the condition, the nature of the procedure, and the benefits to be reasonably expected compared with alternative approaches.  My Dermatologist has discussed the likelihood of major risks or complications of this procedure including the specific risks listed above, such as bleeding, infection, and scarring/keloid.  I understand that a scar is expected after this procedure.  I understand that my physician cannot predict if the scar will form a \"keloid,\" which extends beyond the borders of the wound that is created.  A keloid is a thick, painful, and bumpy scar.  A keloid can be difficult to treat, as it does not always respond well to therapy, which includes injecting cortisone directly into the keloid every few weeks.  While this usually reduces the pain and size of the scar, it does not eliminate it.      I understand that photographs may be taken before and after the procedure.  These will be maintained as part of the medical providers confidential records and may not be made available to me.  I further authorize the medical provider to use the photographs for teaching purposes or to illustrate scientific papers, books, or lectures if in his/her judgment, medical research, education, or science may benefit from its use.    I have had an opportunity to fully inquire about the risks and benefits of this procedure and its alternatives.   I have been given ample time and opportunity to ask questions and to seek a second opinion if I wished to do so.  I acknowledge that there have specifically been no guarantees as to the cosmetic results from the procedure.  I am aware that with any procedure there is always the possibility of an unexpected complication.    III. POST-PROCEDURAL CARE (WHAT YOU WILL NEED TO DO \"AFTER THE BIOPSY\" TO OPTIMIZE HEALING)    Keep the area clean and dry.  Try NOT to remove the bandage or get it wet for the first 24 hours.    Gently clean the area and apply surgical ointment (such as " "Vaseline petrolatum ointment, which is available \"over the counter\" and not a prescription) to the biopsy site for up to 2 weeks straight.  This acts to protect the wound from the outside world.      Sutures are not usually placed in this procedure.  If any sutures were placed, return for suture removal as instructed (generally 1 week for the face, 2 weeks for the body).      Take Acetaminophen (Tylenol) for discomfort, if no contraindications.  Ibuprofen or aspirin could make bleeding worse.    Call our office immediately for signs of infection: fever, chills, increased redness, warmth, tenderness, discomfort/pain, or pus or foul smell coming from the wound.    WHAT TO DO IF THERE IS ANY BLEEDING?  If a small amount of bleeding is noticed, place a clean cloth over the area and apply firm pressure for ten minutes.  Check the wound after 10 minutes of direct pressure.  If bleeding persists, try one more time for an additional 10 minutes of direct pressure on the area.  If the bleeding becomes heavier or does not stop after the second attempt, or if you have any other questions about this procedure, then please call your [facility] Dermatologist by calling [phone] (SKIN).     I hereby acknowledge that I have reviewed and verified the site with my Dermatologist and have requested and authorized my Dermatologist to proceed with the procedure.      Scribe Attestation      I,:  Breanne Thomas MA am acting as a scribe while in the presence of the attending physician.:       I,:  Mansoor Cardoso MD personally performed the services described in this documentation    as scribed in my presence.:             "

## 2024-12-06 PROCEDURE — 88305 TISSUE EXAM BY PATHOLOGIST: CPT | Performed by: PATHOLOGY

## 2024-12-08 ENCOUNTER — RESULTS FOLLOW-UP (OUTPATIENT)
Age: 74
End: 2024-12-08

## 2025-07-21 ENCOUNTER — OFFICE VISIT (OUTPATIENT)
Age: 75
End: 2025-07-21
Payer: MEDICARE

## 2025-07-21 VITALS
TEMPERATURE: 98 F | OXYGEN SATURATION: 100 % | SYSTOLIC BLOOD PRESSURE: 124 MMHG | WEIGHT: 198.8 LBS | HEART RATE: 68 BPM | BODY MASS INDEX: 24.72 KG/M2 | HEIGHT: 75 IN | DIASTOLIC BLOOD PRESSURE: 74 MMHG

## 2025-07-21 DIAGNOSIS — D48.9 NEOPLASM OF UNCERTAIN BEHAVIOR: ICD-10-CM

## 2025-07-21 DIAGNOSIS — L82.1 SEBORRHEIC KERATOSIS: Primary | ICD-10-CM

## 2025-07-21 DIAGNOSIS — Z85.828 HISTORY OF SKIN CANCER: ICD-10-CM

## 2025-07-21 DIAGNOSIS — D22.9 MULTIPLE MELANOCYTIC NEVI: ICD-10-CM

## 2025-07-21 PROCEDURE — 88305 TISSUE EXAM BY PATHOLOGIST: CPT | Performed by: STUDENT IN AN ORGANIZED HEALTH CARE EDUCATION/TRAINING PROGRAM

## 2025-07-21 PROCEDURE — 99214 OFFICE O/P EST MOD 30 MIN: CPT

## 2025-07-21 PROCEDURE — 11102 TANGNTL BX SKIN SINGLE LES: CPT

## 2025-07-21 PROCEDURE — 17003 DESTRUCT PREMALG LES 2-14: CPT

## 2025-07-21 PROCEDURE — 88342 IMHCHEM/IMCYTCHM 1ST ANTB: CPT | Performed by: STUDENT IN AN ORGANIZED HEALTH CARE EDUCATION/TRAINING PROGRAM

## 2025-07-21 PROCEDURE — 17000 DESTRUCT PREMALG LESION: CPT

## 2025-07-21 NOTE — PROGRESS NOTES
"[facility] Note     Patient Name: Miguel Poon  Encounter Date: July 21,2025       Have you been cared for by a [facility] Dermatologist in the last 3 years and, if so, which description applies to you? Yes. I have been here within the last 3 years, and my medical history has NOT changed since that time. I am not of child-bearing potential.     REVIEW OF SYSTEMS:  Have you recently had or currently have any of the following? No changes in my recent health.   PAST MEDICAL HISTORY:  Have you personally ever had or currently have any of the following?  If \"YES,\" then please provide more detail. No changes in my medical history.   HISTORY OF IMMUNOSUPPRESSION: Do you have a history of any of the following:  Systemic Immunosuppression such as Diabetes, Biologic or Immunotherapy, Chemotherapy, Organ Transplantation, Bone Marrow Transplantation or Prednisone?  No     Answering \"YES\" requires the addition of the dotphrase \"IMMUNOSUPPRESSED\" as the first diagnosis of the patient's visit.   FAMILY HISTORY:  Any \"first degree relatives\" (parent, brother, sister, or child) with the following?    No changes in my family's known health.   PATIENT EXPERIENCE:    Do you want the Dermatologist to perform a COMPLETE skin exam today including a clinical examination under the \"bra and underwear\" areas?  NO  If necessary, do we have your permission to call and leave a detailed message on your Preferred Phone number that includes your specific medical information?  Yes      Allergies[1] Current Medications[2]              Whom besides the patient is providing clinical information about today's encounter?   NO ADDITIONAL HISTORIAN (patient alone provided history)    Physical Exam and Assessment/Plan by Diagnosis:    ACTINIC KERATOSIS WITH CRYOSURGERY PROCEDURE    ACTINIC KERATOSIS    Physical Exam:  Anatomic Location Affected:  nasal bridge   Morphological Description:  scaly pink macules     Additional History of " "Present Condition:  present at exam     Assessment and Plan:  Based on a thorough discussion of this condition and the management approach to it (including a comprehensive discussion of the known risks, side effects and potential benefits of treatment), the patient (family) agrees to implement the following specific plan:    Treated with cryotherapy today. Consent signed. See procedure.       PROCEDURE:  DESTRUCTION OF PRE-MALIGNANT LESIONS  After a thorough discussion of treatment options and risk/benefits/alternatives (including but not limited to local pain, scarring, dyspigmentation, blistering, and possible superinfection), verbal and written consent were obtained and the aforementioned lesions were treated on with cryotherapy using liquid nitrogen x 1 cycle for 5-10 seconds.    TOTAL NUMBER of 2  pre-malignant lesions were treated today on the ANATOMIC LOCATION: nasal bridge    The patient tolerated the procedure well, and after-care instructions were provided.     NEOPLASM OF UNCERTAIN BEHAVIOR OF SKIN    Physical Exam:  (Anatomic Location); (Size and Morphological Description); (Differential Diagnosis):  Specimen A: Right medial knee 1.4cm x 1.3 cm pink plaque with scaly border DDX; Porokeratosis vs other       Additional History of Present Condition:  present at exam     Assessment and Plan:  I have discussed with the patient that a sample of skin via a \"skin biopsy” would be potentially helpful to further make a specific diagnosis under the microscope.  Based on a thorough discussion of this condition and the management approach to it (including a comprehensive discussion of the known risks, side effects and potential benefits of treatment), the patient (family) agrees to implement the following specific plan:    Procedure:  Skin Biopsy.  After a thorough discussion of treatment options and risk/benefits/alternatives (including but not limited to local pain, scarring, dyspigmentation, blistering, possible " superinfection, and inability to confirm a diagnosis via histopathology), verbal and written consent were obtained and portion of the rash was biopsied for tissue sample.  See below for consent that was obtained from patient and subsequent Procedure Note.  Will call with results.         PROCEDURE TANGENTIAL (SHAVE) BIOPSY NOTE:    Performing Physician: Ashly Richardson PA-C  Anatomic Location; Clinical Description with size (cm); Pre-Op Diagnosis:   Specimen A: Right medial knee; 1.4cm x 1.3 cm pink plaque with scaly border DDX; Porokeratosis vs other   Post-op diagnosis: Same     Local anesthesia: 1% xylocaine with epi      Topical anesthesia: None    Hemostasis: Aluminum chloride       After obtaining informed consent  at which time there was a discussion about the purpose of biopsy  and low risks of infection and bleeding.  The area was prepped and draped in the usual fashion. Anesthesia was obtained with 1% lidocaine with epinephrine. A shave biopsy to an appropriate sampling depth was obtained by Shave (Dermablade or 15 blade) The resulting wound was covered with surgical ointment and bandaged appropriately.     The patient tolerated the procedure well without complications and was without signs of functional compromise.      Specimen has been sent for review by Dermatopathology.    Standard post-procedure care has been explained and has been included in written form within the patient's copy of Informed Consent.    INFORMED CONSENT DISCUSSION AND POST-OPERATIVE INSTRUCTIONS FOR PATIENT    I.  RATIONALE FOR PROCEDURE  I understand that a skin biopsy allows the Dermatologist to test a lesion or rash under the microscope to obtain a diagnosis.  It usually involves numbing the area with numbing medication and removing a small piece of skin; sometimes the area will be closed with sutures. In this specific procedure, sutures are not usually needed.  If any sutures are placed, then they are usually need to be  "removed in 2 weeks or less.    I understand that my Dermatologist recommends that a skin \"shave\" biopsy be performed today.  A local anesthetic, similar to the kind that a dentist uses when filling a cavity, will be injected with a very small needle into the skin area to be sampled.  The injected skin and tissue underneath \"will go to sleep” and become numb so no pain should be felt afterwards.  An instrument shaped like a tiny \"razor blade\" (shave biopsy instrument) will be used to cut a small piece of tissue and skin from the area so that a sample of tissue can be taken and examined more closely under the microscope.  A slight amount of bleeding will occur, but it will be stopped with direct pressure and a pressure bandage and any other appropriate methods.  I understands that a scar will form where the wound was created.  Surgical ointment will be applied to help protect the wound.  Sutures are not usually needed.    II.  RISKS AND POTENTIAL COMPLICATIONS   I understand the risks and potential complications of a skin biopsy include but are not limited to the following:  Bleeding  Infection  Pain  Scar/keloid  Skin discoloration  Incomplete Removal  Recurrence  Nerve Damage/Numbness/Loss of Function  Allergic Reaction to Anesthesia  Biopsies are diagnostic procedures and based on findings additional treatment or evaluation may be required  Loss or destruction of specimen resulting in no additional findings    My Dermatologist has explained to me the nature of the condition, the nature of the procedure, and the benefits to be reasonably expected compared with alternative approaches.  My Dermatologist has discussed the likelihood of major risks or complications of this procedure including the specific risks listed above, such as bleeding, infection, and scarring/keloid.  I understand that a scar is expected after this procedure.  I understand that my physician cannot predict if the scar will form a \"keloid,\" which " "extends beyond the borders of the wound that is created.  A keloid is a thick, painful, and bumpy scar.  A keloid can be difficult to treat, as it does not always respond well to therapy, which includes injecting cortisone directly into the keloid every few weeks.  While this usually reduces the pain and size of the scar, it does not eliminate it.      I understand that photographs may be taken before and after the procedure.  These will be maintained as part of the medical providers confidential records and may not be made available to me.  I further authorize the medical provider to use the photographs for teaching purposes or to illustrate scientific papers, books, or lectures if in his/her judgment, medical research, education, or science may benefit from its use.    I have had an opportunity to fully inquire about the risks and benefits of this procedure and its alternatives.   I have been given ample time and opportunity to ask questions and to seek a second opinion if I wished to do so.  I acknowledge that there have specifically been no guarantees as to the cosmetic results from the procedure.  I am aware that with any procedure there is always the possibility of an unexpected complication.    III. POST-PROCEDURAL CARE (WHAT YOU WILL NEED TO DO \"AFTER THE BIOPSY\" TO OPTIMIZE HEALING)    Keep the area clean and dry.  Try NOT to remove the bandage or get it wet for the first 24 hours.    Gently clean the area and apply surgical ointment (such as Vaseline petrolatum ointment, which is available \"over the counter\" and not a prescription) to the biopsy site for up to 2 weeks straight.  This acts to protect the wound from the outside world.      Sutures are not usually placed in this procedure.  If any sutures were placed, return for suture removal as instructed (generally 1 week for the face, 2 weeks for the body).      Take Acetaminophen (Tylenol) for discomfort, if no contraindications.  Ibuprofen or aspirin " "could make bleeding worse.    Call our office immediately for signs of infection: fever, chills, increased redness, warmth, tenderness, discomfort/pain, or pus or foul smell coming from the wound.    WHAT TO DO IF THERE IS ANY BLEEDING?  If a small amount of bleeding is noticed, place a clean cloth over the area and apply firm pressure for ten minutes.  Check the wound after 10 minutes of direct pressure.  If bleeding persists, try one more time for an additional 10 minutes of direct pressure on the area.  If the bleeding becomes heavier or does not stop after the second attempt, or if you have any other questions about this procedure, then please call your [facility]'s Dermatologist by calling [phone] (SKIN).     I hereby acknowledge that I have reviewed and verified the site with my Dermatologist and have requested and authorized my Dermatologist to proceed with the procedure.    HISTORY OF BASAL CELL CARCINOMA     Physical Exam:  Anatomic Location Affected:  Left Post Auricular Ear 11/30/2021  Morphological Description of scar:  Well healed  Suspected Recurrence: No        Additional History of Present Condition:  History of basal cell carcinoma with no sign of recurrence     Assessment and Plan:  Based on a thorough discussion of this condition and the management approach to it (including a comprehensive discussion of the known risks, side effects and potential benefits of treatment), the patient (family) agrees to implement the following specific plan:  Continue to monitor.  Skin exams every year.   Sun avoidance, protective clothing (known as UPF clothing), and the use of at least SPF 30 sunscreens is advised. Sunscreen should be reapplied every two hours when outside.     MELANOCYTIC NEVI (\"Moles\")    Physical Exam:  Anatomic Location Affected: Mostly on sun-exposed areas of the trunk and extremities   Morphological Description:  Scattered, 1-4mm round to ovoid, symmetrical-appearing, even bordered, skin " "colored to dark brown macules/papules, mostly in sun-exposed areas      Additional History of Present Condition:  reassured benign     Assessment and Plan:  Based on a thorough discussion of this condition and the management approach to it (including a comprehensive discussion of the known risks, side effects and potential benefits of treatment), the patient (family) agrees to implement the following specific plan:  Provided handout with information regarding the ABCDE's of moles   Recommend routine skin exams every year   Sun avoidance, protective clothing (known as UPF clothing), and the use of at least SPF 30 sunscreens is advised. Sunscreen should be reapplied every two hours when outside.       SEBORRHEIC KERATOSIS; NON-INFLAMED    Physical Exam:  Anatomic Location Affected:  scattered across head- including scalp and face, trunk, extremities  Morphological Description:  Flat and raised, waxy, smooth to warty textured, yellow to brownish-grey to dark brown to blackish, discrete, \"stuck-on\" appearing papules.    Additional History of Present Condition: Present on exam.   Assessment and Plan:  Based on a thorough discussion of this condition and the management approach to it (including a comprehensive discussion of the known risks, side effects and potential benefits of treatment), the patient (family) agrees to implement the following specific plan:  Reassured benign      Follow-up: 1 year skin exam.       Scribe Attestation      I,:  Beronica Constantino MA am acting as a scribe while in the presence of the attending physician.:       I,:  Ashly Richardson PA-C personally performed the services described in this documentation    as scribed in my presence.:                [1] No Known Allergies  [2]   Current Outpatient Medications:     amLODIPine (NORVASC) 10 mg tablet, Take 10 mg by mouth daily, Disp: , Rfl:     apixaban (ELIQUIS) 5 mg, 2 (two) times a day, Disp: , Rfl:     atorvastatin (LIPITOR) 40 mg tablet, Take " 1 tablet (40 mg total) by mouth every evening, Disp: 30 tablet, Rfl: 0    B-D UF III MINI PEN NEEDLES 31G X 5 MM MISC, , Disp: , Rfl:     clopidogrel (PLAVIX) 75 mg tablet, TAKE 1 TABLET DAILY, Disp: 90 tablet, Rfl: 3    Continuous Blood Gluc  (FreeStyle Kathya 14 Day [place]) DIOGO, Use 1 each every 14 (fourteen) days, Disp: , Rfl:     ergocalciferol (ERGOCALCIFEROL) 1.25 MG (40230 UT) capsule, Take 5,000 Units by mouth daily, Disp: , Rfl:     fluticasone (FLONASE) 50 mcg/act nasal spray, , Disp: , Rfl:     FREESTYLE LITE test strip, , Disp: , Rfl:     glimepiride (AMARYL) 2 mg tablet, Take 2 mg by mouth, Disp: , Rfl:     insulin aspart (NovoLOG) 100 units/mL injection, Inject 5 Units under the skin 3 (three) times a day with meals, Disp: , Rfl:     insulin detemir (LEVEMIR FLEXTOUCH) 100 Units/mL injection pen, INJECT 21 UNITS AT BEDTIME, Disp: , Rfl:     Jardiance 10 MG TABS, , Disp: , Rfl:     loratadine (CLARITIN) 10 mg tablet, , Disp: , Rfl:     metoprolol succinate (TOPROL-XL) 25 mg 24 hr tablet, Take 1 tablet (25 mg total) by mouth daily, Disp: 30 tablet, Rfl: 0    pantoprazole (PROTONIX) 40 mg tablet, Take 1 tablet (40 mg total) by mouth daily, Disp: 30 tablet, Rfl: 1    ramipril (ALTACE) 10 MG capsule, TAKE 1 CAPSULE TWICE A DAY, Disp: , Rfl:     Silodosin 8 MG CAPS, Take 8 mg by mouth daily, Disp: , Rfl:

## 2025-07-21 NOTE — PATIENT INSTRUCTIONS
"MELANOCYTIC NEVI (\"Moles\")      Melanocytic nevi (\"moles\") are tan or brown, raised or flat areas of the skin which have an increased number of melanocytes. Melanocytes are the cells in our body which make pigment and account for skin color.    Some moles are present at birth (I.e., \"congenital nevi\"), while others come up later in life (i.e., \"acquired nevi\").  The sun can stimulate the body to make more moles.  Sunburns are not the only thing that triggers more moles.  Chronic sun exposure can do it too.     Clinically distinguishing a healthy mole from melanoma may be difficult, even for experienced dermatologists. The \"ABCDE's\" of moles have been suggested as a means of helping to alert a person to a suspicious mole and the possible increased risk of melanoma.  The suggestions for raising alert are as follows:    Asymmetry: Healthy moles tend to be symmetric, while melanomas are often asymmetric.  Asymmetry means if you draw a line through the mole, the two halves do not match in color, size, shape, or surface texture. Asymmetry can be a result of rapid enlargement of a mole, the development of a raised area on a previously flat lesion, scaling, ulceration, bleeding or scabbing within the mole.  Any mole that starts to demonstrate \"asymmetry\" should be examined promptly by a board certified dermatologist.     Border: Healthy moles tend to have discrete, even borders.  The border of a melanoma often blends into the normal skin and does not sharply delineate the mole from normal skin.  Any mole that starts to demonstrate \"uneven borders\" should be examined promptly by a board certified dermatologist.     Color: Healthy moles tend to be one color throughout.  Melanomas tend to be made up of different colors ranging from dark black, blue, white, or red.  Any mole that demonstrates a color change should be examined promptly by a board certified dermatologist.     Diameter: Healthy moles tend to be smaller than 0.6 cm " "in size; an exception are \"congenital nevi\" that can be larger.  Melanomas tend to grow and can often be greater than 0.6 cm (1/4 of an inch, or the size of a pencil eraser). This is only a guideline, and many normal moles may be larger than 0.6 cm without being unhealthy.  Any mole that starts to change in size (small to bigger or bigger to smaller) should be examined promptly by a board certified dermatologist.     Evolving: Healthy moles tend to \"stay the same.\"  Melanomas may often show signs of change or evolution such as a change in size, shape, color, or elevation.  Any mole that starts to itch, bleed, crust, burn, hurt, or ulcerate or demonstrate a change or evolution should be examined promptly by a board certified dermatologist.      Dysplastic Nevi  Dysplastic moles are moles that fit the ABCDE rules of melanoma but are not identified as melanomas when examined under the microscope.  They may indicate an increased risk of melanoma in that person. If there is a family history of melanoma, most experts agree that the person may be at an increased risk for developing a melanoma.  Experts still do not agree on what dysplastic moles mean in patients without a personal or family history of melanoma.  Dysplastic moles are usually larger than common moles and have different colors within it with irregular borders. The appearance can be very similar to a melanoma. Biopsies of dysplastic moles may show abnormalities which are different from a regular mole.      Melanoma  Malignant melanoma is a type of skin cancer that can be deadly if it spreads throughout the body. The incidence of melanoma in the [place] is growing faster than any other cancer. Melanoma usually grows near the surface of the skin for a period of time, and then begins to grow deeper into the skin. Once it grows deeper into the skin, the risk of spread to other organs greatly increases. Therefore, early detection and removal of a malignant " "melanoma may result in a better chance at a complete cure; removal after the tumor has spread may not be as effective, leading to worse clinical outcomes such as death.    The true rate of nevus transformation into a melanoma is unknown. It has been estimated that the lifetime risk for any acquired melanocytic nevus on any 20-year-old individual transforming into melanoma by age 80 is 0.03% (1 in 3,164) for men and 0.009% (1 in 10,800) for women.     The appearance of a \"new mole\" remains one of the most reliable methods for identifying a malignant melanoma.  Occasionally, melanomas appear as rapidly growing, blue-black, dome-shaped bumps within a previous mole or previous area of normal skin.  Other times, melanomas are suspected when a mole suddenly appears or changes. Itching, burning, or pain in a pigmented lesion should increase suspicion, but most patients with early melanoma have no skin discomfort whatsoever.  Melanoma can occur anywhere on the skin, including areas that are difficult for self-examination. Many melanomas are first noticed by other family members.  Suspicious-looking moles may be removed for microscopic examination.       You may be able to prevent death from melanoma by doing two simple things:    Try to avoid unnecessary sun exposure and protect your skin when it is exposed to the sun.  People who live near the equator, people who have intermittent exposures to large amounts of sun, and people who have had sunburns in childhood or adolescence have an increased risk for melanoma. Sun sense and vigilant sun protection may be keys to helping to prevent melanoma.  We recommend wearing UPF-rated sun protective clothing and sunglasses whenever possible and applying a moisturizer-sunscreen combination product (SPF 50+) such as Neutrogena Daily Defense to sun exposed areas of skin at least three times a day.    Have your moles regularly examined by a board certified dermatologist AND by yourself or " "a family member/friend at home.  We recommend that you have your moles examined at least once a year by a board certified dermatologist.  Use your birthday as an annual reminder to have your \"Birthday Suit\" (I.e., your skin) examined; it is a nice birthday gift to yourself to know that your skin is healthy appearing!  Additionally, at-home self examinations may be helpful for detecting a possible melanoma.  Use the ABCDEs we discussed and check your moles once a month at home.        SEBORRHEIC KERATOSIS  A seborrheic keratosis is a harmless warty spot that appears during adult life as a common sign of skin aging.  Seborrheic keratoses can arise on any area of skin, covered or uncovered, with the usual exception of the palms and soles. They do not arise from mucous membranes. Seborrheic keratoses can have highly variable appearance.      Seborrheic keratoses are extremely common. It has been estimated that over 90% of adults over the age of 60 years have one or more of them. They occur in males and females of all races, typically beginning to erupt in the 30s or 40s. They are uncommon under the age of 20 years.  The precise cause of seborrhoeic keratoses is not known.  Seborrhoeic keratoses are considered degenerative in nature. As time goes by, seborrheic keratoses tend to become more numerous. Some people inherit a tendency to develop a very large number of them; some people may have hundreds of them.    The name \"seborrheic keratosis\" is misleading, because these lesions are not limited to a seborrhoeic distribution (scalp, mid-face, chest, upper back), nor are they formed from sebaceous glands, nor are they associated with sebum -- which is greasy.  Seborrheic keratosis may also be called \"SK,\" \"Seb K,\" \"basal cell papilloma,\" \"senile wart,\" or \"barnacle.\"      There is no easy way to remove multiple lesions on a single occasion.  Unless a specific lesion is \"inflamed\" and is causing pain or stinging/burning or " "is bleeding, most insurance companies do not authorize treatment.      ANGIOMA (\"CHERRY ANGIOMA\")  Jane angiomas markedly increase in number from about the age of 40, so it has been estimated that 75% of people over 75 years of age have them. Although they also called \"senile angiomas,\" they can occur in young people too - 5% of adolescents have been found to have them.     Cherry angiomas are very common in males and females of any age or race, with no difference in sexes or races affected. They are however more noticeable in white skin than in skin of colour.  There may be a family history of similar lesions. Eruptive (very large number appearing in a short period of time) cherry angiomas have been rarely reported to be associated with internal malignancy and pregnancy.    INFORMED CONSENT DISCUSSION AND POST-OPERATIVE INSTRUCTIONS FOR PATIENT    I.  RATIONALE FOR PROCEDURE  I understand that a skin biopsy allows the Dermatologist to test a lesion or rash under the microscope to obtain a diagnosis.  It usually involves numbing the area with numbing medication and removing a small piece of skin; sometimes the area will be closed with sutures. In this specific procedure, sutures are not usually needed.  If any sutures are placed, then they are usually need to be removed in 2 weeks or less.    I understand that my Dermatologist recommends that a skin \"shave\" biopsy be performed today.  A local anesthetic, similar to the kind that a dentist uses when filling a cavity, will be injected with a very small needle into the skin area to be sampled.  The injected skin and tissue underneath \"will go to sleep” and become numb so no pain should be felt afterwards.  An instrument shaped like a tiny \"razor blade\" (shave biopsy instrument) will be used to cut a small piece of tissue and skin from the area so that a sample of tissue can be taken and examined more closely under the microscope.  A slight amount of bleeding will " "occur, but it will be stopped with direct pressure and a pressure bandage and any other appropriate methods.  I understands that a scar will form where the wound was created.  Surgical ointment will be applied to help protect the wound.  Sutures are not usually needed.    II.  RISKS AND POTENTIAL COMPLICATIONS   I understand the risks and potential complications of a skin biopsy include but are not limited to the following:  Bleeding  Infection  Pain  Scar/keloid  Skin discoloration  Incomplete Removal  Recurrence  Nerve Damage/Numbness/Loss of Function  Allergic Reaction to Anesthesia  Biopsies are diagnostic procedures and based on findings additional treatment or evaluation may be required  Loss or destruction of specimen resulting in no additional findings    My Dermatologist has explained to me the nature of the condition, the nature of the procedure, and the benefits to be reasonably expected compared with alternative approaches.  My Dermatologist has discussed the likelihood of major risks or complications of this procedure including the specific risks listed above, such as bleeding, infection, and scarring/keloid.  I understand that a scar is expected after this procedure.  I understand that my physician cannot predict if the scar will form a \"keloid,\" which extends beyond the borders of the wound that is created.  A keloid is a thick, painful, and bumpy scar.  A keloid can be difficult to treat, as it does not always respond well to therapy, which includes injecting cortisone directly into the keloid every few weeks.  While this usually reduces the pain and size of the scar, it does not eliminate it.      I understand that photographs may be taken before and after the procedure.  These will be maintained as part of the medical providers confidential records and may not be made available to me.  I further authorize the medical provider to use the photographs for teaching purposes or to illustrate scientific " "papers, books, or lectures if in his/her judgment, medical research, education, or science may benefit from its use.    I have had an opportunity to fully inquire about the risks and benefits of this procedure and its alternatives.   I have been given ample time and opportunity to ask questions and to seek a second opinion if I wished to do so.  I acknowledge that there have specifically been no guarantees as to the cosmetic results from the procedure.  I am aware that with any procedure there is always the possibility of an unexpected complication.    III. POST-PROCEDURAL CARE (WHAT YOU WILL NEED TO DO \"AFTER THE BIOPSY\" TO OPTIMIZE HEALING)    Keep the area clean and dry.  Try NOT to remove the bandage or get it wet for the first 24 hours.    Gently clean the area and apply surgical ointment (such as Vaseline petrolatum ointment, which is available \"over the counter\" and not a prescription) to the biopsy site for up to 2 weeks straight.  This acts to protect the wound from the outside world.      Sutures are not usually placed in this procedure.  If any sutures were placed, return for suture removal as instructed (generally 1 week for the face, 2 weeks for the body).      Take Acetaminophen (Tylenol) for discomfort, if no contraindications.  Ibuprofen or aspirin could make bleeding worse.    Call our office immediately for signs of infection: fever, chills, increased redness, warmth, tenderness, discomfort/pain, or pus or foul smell coming from the wound.    WHAT TO DO IF THERE IS ANY BLEEDING?  If a small amount of bleeding is noticed, place a clean cloth over the area and apply firm pressure for ten minutes.  Check the wound after 10 minutes of direct pressure.  If bleeding persists, try one more time for an additional 10 minutes of direct pressure on the area.  If the bleeding becomes heavier or does not stop after the second attempt, or if you have any other questions about this procedure, then please call " your [facility] Dermatologist by calling [phone] (SKIN).     I hereby acknowledge that I have reviewed and verified the site with my Dermatologist and have requested and authorized my Dermatologist to proceed with the procedure.

## 2025-07-28 PROCEDURE — 88342 IMHCHEM/IMCYTCHM 1ST ANTB: CPT | Performed by: STUDENT IN AN ORGANIZED HEALTH CARE EDUCATION/TRAINING PROGRAM

## 2025-07-28 PROCEDURE — 88305 TISSUE EXAM BY PATHOLOGIST: CPT | Performed by: STUDENT IN AN ORGANIZED HEALTH CARE EDUCATION/TRAINING PROGRAM
